# Patient Record
Sex: MALE | Race: WHITE | HISPANIC OR LATINO | Employment: FULL TIME | URBAN - METROPOLITAN AREA
[De-identification: names, ages, dates, MRNs, and addresses within clinical notes are randomized per-mention and may not be internally consistent; named-entity substitution may affect disease eponyms.]

---

## 2023-06-01 ENCOUNTER — HOSPITAL ENCOUNTER (EMERGENCY)
Facility: HOSPITAL | Age: 58
Discharge: HOME | End: 2023-06-01
Attending: EMERGENCY MEDICINE
Payer: MEDICAID

## 2023-06-01 ENCOUNTER — APPOINTMENT (OUTPATIENT)
Dept: CT IMAGING | Facility: HOSPITAL | Age: 58
End: 2023-06-01
Payer: MEDICAID

## 2023-06-01 VITALS
WEIGHT: 186 LBS | HEIGHT: 63 IN | RESPIRATION RATE: 15 BRPM | BODY MASS INDEX: 32.96 KG/M2 | SYSTOLIC BLOOD PRESSURE: 148 MMHG | HEART RATE: 90 BPM | OXYGEN SATURATION: 95 % | DIASTOLIC BLOOD PRESSURE: 96 MMHG | TEMPERATURE: 98.2 F

## 2023-06-01 DIAGNOSIS — M54.2 ACUTE NECK PAIN: Primary | ICD-10-CM

## 2023-06-01 PROCEDURE — 96372 THER/PROPH/DIAG INJ SC/IM: CPT

## 2023-06-01 PROCEDURE — 99283 EMERGENCY DEPT VISIT LOW MDM: CPT

## 2023-06-01 PROCEDURE — 72125 CT NECK SPINE W/O DYE: CPT

## 2023-06-01 PROCEDURE — 25010000002 METHYLPREDNISOLONE PER 80 MG: Performed by: PHYSICIAN ASSISTANT

## 2023-06-01 RX ORDER — METHYLPREDNISOLONE 4 MG/1
TABLET ORAL
Qty: 21 TABLET | Refills: 0 | Status: SHIPPED | OUTPATIENT
Start: 2023-06-01

## 2023-06-01 RX ORDER — METHYLPREDNISOLONE ACETATE 80 MG/ML
80 INJECTION, SUSPENSION INTRA-ARTICULAR; INTRALESIONAL; INTRAMUSCULAR; SOFT TISSUE ONCE
Status: COMPLETED | OUTPATIENT
Start: 2023-06-01 | End: 2023-06-01

## 2023-06-01 RX ORDER — METHOCARBAMOL 750 MG/1
750 TABLET, FILM COATED ORAL 3 TIMES DAILY PRN
Qty: 30 TABLET | Refills: 0 | Status: SHIPPED | OUTPATIENT
Start: 2023-06-01

## 2023-06-01 RX ORDER — DIAZEPAM 2 MG/1
2 TABLET ORAL ONCE
Status: COMPLETED | OUTPATIENT
Start: 2023-06-01 | End: 2023-06-01

## 2023-06-01 RX ADMIN — DIAZEPAM 2 MG: 2 TABLET ORAL at 14:28

## 2023-06-01 RX ADMIN — METHYLPREDNISOLONE ACETATE 80 MG: 80 INJECTION, SUSPENSION INTRA-ARTICULAR; INTRALESIONAL; INTRAMUSCULAR; SOFT TISSUE at 14:29

## 2023-06-01 NOTE — Clinical Note
[facility] EMERGENCY DEPARTMENT  [address] [facility] [address]  Phone: [phone]    Heriberto Fields was seen and treated in our emergency department on 6/1/2023.  He may return to work on 06/03/2023.         Thank you for choosing [facility].    Ashley Pina PA-C

## 2023-06-01 NOTE — DISCHARGE INSTRUCTIONS
Follow-up with Dr. Lopez regarding your neck pain  Call patient connection to establish with a primary care provider  Complete entire course of Medrol steroid pack and use the Robaxin muscle relaxer to help with pain.  Return to emergency department if you develop fever, numbness or weakness of your arms, cannot walk.

## 2023-06-01 NOTE — ED PROVIDER NOTES
EMERGENCY DEPARTMENT ENCOUNTER    Room Number:  S01/01  Date seen:  6/2/2023  PCP: Provider, No Known        HPI:  Chief Complaint: Neck pain    Context: Heriberto Fields is a 57 y.o. male who presents to the ED c/o neck pain that has progressively worsened over the past 3 days.  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  3 days ago he started feeling pain in his neck that now radiates to both sides.  Reports it hurts to turn his neck in any direction.  Denies associated fever, numbness/weakness of the upper extremities, bowel/bladder incontinence, or saddle anesthesia.  No blunt trauma or injury to the neck or back.  Patient has been ambulatory without difficulty.  No other systemic complaints at this time.      External (non-ED) record review:   · Reviewed note from office visit with PCP with labs 5/2023 where patient seen for annual physical exam, hyperlipidemia, hypertension, lumbar radiculopathy.  Reviewed assessment and plan.  Plan to check labs, continue current medications, return in 6 months for follow-up.  · Reviewed recent laboratory studies.  Most recent CBC with hemoglobin 15.8.  Most recent CMP with creatinine 1.00.      PAST MEDICAL HISTORY  Active Ambulatory Problems     Diagnosis Date Noted   • No Active Ambulatory Problems     Resolved Ambulatory Problems     Diagnosis Date Noted   • No Resolved Ambulatory Problems     No Additional Past Medical History         PAST SURGICAL HISTORY  No past surgical history on file.      FAMILY HISTORY  No family history on file.      SOCIAL HISTORY  Social History     Socioeconomic History   • Marital status:          ALLERGIES  Patient has no known allergies.        REVIEW OF SYSTEMS  Review of Systems   Constitutional: Negative for chills and fever.   HENT: Negative for ear pain and sore throat.    Respiratory: Negative for cough and shortness of breath.    Cardiovascular:  Negative for chest pain and palpitations.   Gastrointestinal: Negative for abdominal pain and vomiting.   Genitourinary: Negative for dysuria and hematuria.   Musculoskeletal: Positive for neck pain. Negative for arthralgias and joint swelling.   Skin: Negative for pallor and rash.   Neurological: Negative for syncope and headaches.   Psychiatric/Behavioral: Negative for confusion and hallucinations.            PHYSICAL EXAM  ED Triage Vitals [06/01/23 1308]   Temp Heart Rate Resp BP SpO2   98.2 °F (36.8 °C) 90 15 148/96 95 %      Temp src Heart Rate Source Patient Position BP Location FiO2 (%)   -- -- -- -- --       Physical Exam  Constitutional:       General: He is not in acute distress.     Appearance: Normal appearance.   HENT:      Head: Normocephalic and atraumatic.      Nose: Nose normal.      Mouth/Throat:      Mouth: Mucous membranes are moist.   Eyes:      Conjunctiva/sclera: Conjunctivae normal.      Pupils: Pupils are equal, round, and reactive to light.   Neck:      Comments: Diffuse cervical spine tenderness.  Tenderness and spasm noted to bilateral upper trapezius muscles.  Limited ROM of the neck secondary to pain.  Cardiovascular:      Rate and Rhythm: Normal rate and regular rhythm.      Pulses: Normal pulses.      Heart sounds: Normal heart sounds.      Comments: Distal pulses intact  Pulmonary:      Effort: Pulmonary effort is normal.      Breath sounds: Normal breath sounds.   Abdominal:      General: There is no distension.   Musculoskeletal:         General: Normal range of motion.      Cervical back: Normal range of motion and neck supple.   Skin:     General: Skin is warm.      Capillary Refill: Capillary refill takes less than 2 seconds.   Neurological:      General: No focal deficit present.      Mental Status: He is alert and oriented to person, place, and time.      Comments: SILT BUE.  Motor strength 5/5 BUE   Psychiatric:         Mood and Affect: Mood normal.         Vital signs and  nursing notes reviewed.          RADIOLOGY  CT Cervical Spine Without Contrast    Result Date: 6/1/2023  CT CERVICAL SPINE WITHOUT CONTRAST  HISTORY: Neck tenderness for 3 days.  COMPARISON: None.  FINDINGS: There is moderate loss of disc height at C5-C6 and C6-C7. There is a grade 1 retrolisthesis of C5 on C6 estimated to be 2 mm. There is no evidence of prevertebral edema or fracture.  C2-C3: Mild uncovertebral degenerative disease is present on the right.  C3-C4: Mild-to-moderate facet degenerative disease is present on the left. There is mild central disc bulge or protrusion.  C4-C5: A central disc bulge or protrusion is appreciated which abuts and mildly flattens the ventral surface of the cord.  C5-C6: A mild broad-based disc osteophyte complex is present which is more prominent to the right. Mild foraminal stenosis is present on the right secondary to uncovertebral degenerative disease and loss of disc height and extension of a disc osteophyte complex into the neural foramen.  C6-C7: A mild central disc osteophyte complex is present with no evidence of herniation. Mild-to-moderate foraminal stenosis is present on the left secondary to uncovertebral degenerative disease.  C7-T1: There is no evidence of disc bulge or herniation.      1. Multilevel degenerative disease involving the cervical spine is noted as described above. 2. There is no evidence of fracture. 3. Degenerative disease includes multilevel disc osteophyte complexes and foraminal stenosis. No obvious disc herniation is appreciated. See above. Further evaluation could be performed with a MRI examination of the cervical spine as indicated.      Radiation dose reduction techniques were utilized, including automated exposure control and exposure modulation based on body size.  This report was finalized on 6/1/2023 7:56 PM by Dr. Seven Ceja M.D.        Ordered the above noted radiological studies. Reviewed by me in PACS.              MEDICATIONS  GIVEN IN ER  Medications   diazePAM (VALIUM) tablet 2 mg (2 mg Oral Given 6/1/23 1428)   methylPREDNISolone acetate (DEPO-medrol) injection 80 mg (80 mg Intramuscular Given 6/1/23 1429)                   MEDICAL DECISION MAKING, PROGRESS, and CONSULTS    All labs have been independently reviewed by me.  All radiology studies have been reviewed by me and I have also reviewed the radiology report.   EKG's independently viewed and interpreted by me.  Discussion below represents my analysis of pertinent findings related to patient's condition, differential diagnosis, treatment plan and final disposition.      Additional sources:    - Chronic or social conditions impacting care: Hypertension and hyperlipidemia        Orders placed during this visit:  Orders Placed This Encounter   Procedures   • CT Cervical Spine Without Contrast         Additional orders considered but not ordered:  Tylenol    Differential diagnosis:  Cervical radiculopathy, cervical muscle spasm, cervical spine fracture      Independent interpretation of labs, radiology studies, and discussions with consultants:  ED Course as of 06/02/23 1000   Thu Jun 01, 2023   1554 CT Cervical Spine Without Contrast  Radiology report reviewed, chronic degenerative changes noted without spinal stenosis [DC]   1900 Patient presents with neck pain today.  Worked up with CT of cervical spine, without evidence for C-spine fracture or large disc herniation.  Patient reports symptomatic improvement after Depo-Medrol and Valium in the ED.  Encouraged him to follow-up with neurosurgery regarding his neck pain, will discharge with steroid pack and muscle relaxers to help with pain at home.  Discussed ED return precautions.  He is otherwise well-appearing, hemodynamically stable, and therefore appropriate for discharge. [MP]      ED Course User Index  [DC] Rancho Sage MD  [MP] Ashley Pina PA-C             Patient was wearing a face mask when I entered the room and  they continued to wear a mask throughout their stay in the ED.  I wore PPE, including  gloves, face mask with shield or face mask with goggles whenever I was in the room with patient.     DIAGNOSIS  Final diagnoses:   Acute neck pain           Follow Up:  [facility] - [place] [address]  [phone]  Schedule an appointment as soon as possible for a visit in 2 days  For follow-up of your complaints    Gus Lopez MD  [address]  [place] [id number] [phone]    Schedule an appointment as soon as possible for a visit   For follow-up of your neck pain      RX:     Medication List      New Prescriptions    methocarbamol 750 MG tablet  Commonly known as: ROBAXIN  Take 1 tablet by mouth 3 (Three) Times a Day As Needed for Muscle Spasms.     methylPREDNISolone 4 MG dose pack  Commonly known as: MEDROL  Take as directed on package instructions.           Where to Get Your Medications      These medications were sent to [id number] - [place], [place] - [address]  AT [facility] [address] - [phone]  - [phone]   [address] , [facility] [address]    Phone: [phone]   · methocarbamol 750 MG tablet  · methylPREDNISolone 4 MG dose pack         Latest Documented Vital Signs:  As of 10:00 EDT  BP- 148/96 HR- 90 Temp- 98.2 °F (36.8 °C) O2 sat- 95%              --    Please note that portions of this were completed with a voice recognition program.       Note Disclaimer: At [facility], we believe that sharing information builds trust and better relationships. You are receiving this note because you are receiving care at [facility] or recently visited. It is possible you will see health information before a provider has talked with you about it. This kind of information can be easy to misunderstand. To help you fully understand what it means for your health, we urge you to discuss this note with your provider.            Ashley Pina PA-C  06/02/23 1000

## 2023-06-01 NOTE — Clinical Note
[facility] EMERGENCY DEPARTMENT  [address] [facility] [address]  Phone: [phone]    Heriberto Fields was seen and treated in our emergency department on 6/1/2023.  He may return to work on 06/03/2023.         Thank you for choosing [facility].    Ashley Pina PA-C

## 2023-06-01 NOTE — ED PROVIDER NOTES
Pt presents to the ED c/o  progressive bilateral neck pain over the last 3 days, has pain in both sides of his neck without significant numbness or weakness of the arms or legs.  No fall or direct trauma.  No fevers or chills.     On exam,   General: No acute distress, nontoxic  HEENT: EOMI, bilateral paraspinal tenderness to to palpation  Pulm: Symmetric chest rise, nonlabored breathing  CV: Regular rate and rhythm  GI: Nondistended  MSK: No deformity  Skin: Warm, dry  Neuro: Awake, alert, oriented x 4, 5 out of 5 strength sensation bilateral upper and lower extremities, moving all extremities, no focal deficits  Psych: Calm, cooperative    Vital signs and nursing notes reviewed.           Plan:   ED Course as of 06/02/23 1609   Thu Jun 01, 2023   1554 CT Cervical Spine Without Contrast  Radiology report reviewed, chronic degenerative changes noted without spinal stenosis [DC]   1900 Patient presents with neck pain today.  Worked up with CT of cervical spine, without evidence for C-spine fracture or large disc herniation.  Patient reports symptomatic improvement after Depo-Medrol and Valium in the ED.  Encouraged him to follow-up with neurosurgery regarding his neck pain, will discharge with steroid pack and muscle relaxers to help with pain at home.  Discussed ED return precautions.  He is otherwise well-appearing, hemodynamically stable, and therefore appropriate for discharge. [MP]      ED Course User Index  [DC] Rancho Sage MD  [MP] Ashley Pina, ARIC     Plan for a course of steroids and muscle relaxers with outpatient neurosurgery follow-up.  Supportive care measures discussed, ED return for worsening symptoms as needed.  All questions and concerns addressed.     Attestation:  The PAMELA and I have discussed this patient's history, physical exam, and treatment plan.  I have reviewed the documentation and personally had a face to face interaction with the patient. I affirm the documentation and agree  with the treatment and plan.  The attached note describes my personal findings.            Rancho Sage MD  06/01/23 4732       Rancho aSge MD  06/02/23 5433

## 2023-08-03 ENCOUNTER — HOSPITAL ENCOUNTER (EMERGENCY)
Facility: HOSPITAL | Age: 58
Discharge: HOME | End: 2023-08-03
Attending: EMERGENCY MEDICINE
Payer: MEDICAID

## 2023-08-03 VITALS
HEART RATE: 96 BPM | TEMPERATURE: 97.6 F | WEIGHT: 176 LBS | DIASTOLIC BLOOD PRESSURE: 76 MMHG | SYSTOLIC BLOOD PRESSURE: 110 MMHG | BODY MASS INDEX: 26.67 KG/M2 | HEIGHT: 68 IN | RESPIRATION RATE: 18 BRPM | OXYGEN SATURATION: 96 %

## 2023-08-03 DIAGNOSIS — S81.811A LACERATION OF RIGHT LOWER EXTREMITY, INITIAL ENCOUNTER: Primary | ICD-10-CM

## 2023-08-03 PROCEDURE — 90715 TDAP VACCINE 7 YRS/> IM: CPT | Performed by: PHYSICIAN ASSISTANT

## 2023-08-03 PROCEDURE — 99283 EMERGENCY DEPT VISIT LOW MDM: CPT

## 2023-08-03 PROCEDURE — 90471 IMMUNIZATION ADMIN: CPT | Performed by: PHYSICIAN ASSISTANT

## 2023-08-03 PROCEDURE — 25010000002 TETANUS-DIPHTH-ACELL PERTUSSIS 5-2.5-18.5 LF-MCG/0.5 SUSPENSION PREFILLED SYRINGE: Performed by: PHYSICIAN ASSISTANT

## 2023-08-03 RX ADMIN — TETANUS TOXOID, REDUCED DIPHTHERIA TOXOID AND ACELLULAR PERTUSSIS VACCINE, ADSORBED 0.5 ML: 5; 2.5; 8; 8; 2.5 SUSPENSION INTRAMUSCULAR at 21:57

## 2023-08-04 NOTE — ED NOTES
Pt was loading stuff on truck at home when he slipped and fell. Pt c/o right anterior leg pain and lip pain. Pt has wound to right lower leg and swelling to right upper lip. Bleeding controlled

## 2023-08-04 NOTE — ED PROVIDER NOTES
Pt presents to the ED c/o a slip and fall earlier today landing on his right knee and busting his upper lip.  No LOC, not on any anticoagulation.  Denies any neck pain or back pain.  No numbness or weakness to the arms or legs otherwise.     On exam,   General: No acute distress, nontoxic  HEENT: EOMI, contusion to the right upper lip without underlying laceration or dental trauma  Pulm: Symmetric chest rise, nonlabored breathing  CV: Regular rate and rhythm  GI: Nondistended  MSK: 3 cm laceration over the anterior right knee with intact range of motion  Skin: Warm, dry  Neuro: Awake, alert, oriented x 4, moving all extremities, no focal deficits  Psych: Calm, cooperative    Vital signs and nursing notes reviewed.         Plan: Plan for laceration repair, no need for imaging at this time given reassuring exam.  Supportive care measures discussed, suture removal in 1 week, ED return for worsening symptoms as needed.       Attestation:  The PAMELA and I have discussed this patient's history, physical exam, and treatment plan.  I have reviewed the documentation and personally had a face to face interaction with the patient. I affirm the documentation and agree with the treatment and plan.  The attached note describes my personal findings.            Rancho Sage MD  08/03/23 7425

## 2023-08-04 NOTE — ED PROVIDER NOTES
EMERGENCY DEPARTMENT ENCOUNTER    Room Number:  T01/01  PCP: Courtney Croft APRN      HPI:  Chief Complaint: Fall  A complete HPI/ROS/PMH/PSH/SH/FH are unobtainable due to: Nothing  Context: Heriberto Fields is a 58 y.o. male who presents to the ED c/o mechanical fall.  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  The floor was said to be slippery contributing to the fall.  Patient states he hit his upper lip but did not sustain a laceration.  It is not painful to bite or chew.  He also hit the right knee sustaining a laceration.    Pain is said to be minimal and primarily localized to the laceration site overlying the right knee.  Pain does not radiate.  He states he can ambulate on the right knee without any difficulty.  His last Tdap is unknown.    He denies LOC, nausea, vomiting, being on anticoagulant antiplatelet therapy.  He denies any other pain or injury at this time.      PAST MEDICAL HISTORY  Active Ambulatory Problems     Diagnosis Date Noted    No Active Ambulatory Problems     Resolved Ambulatory Problems     Diagnosis Date Noted    No Resolved Ambulatory Problems     Past Medical History:   Diagnosis Date    GERD (gastroesophageal reflux disease)          PAST SURGICAL HISTORY  History reviewed. No pertinent surgical history.      FAMILY HISTORY  History reviewed. No pertinent family history.      SOCIAL HISTORY  Social History     Socioeconomic History    Marital status:    Tobacco Use    Smoking status: Never   Substance and Sexual Activity    Alcohol use: Yes     Comment: socially    Drug use: Never         ALLERGIES  Patient has no known allergies.        REVIEW OF SYSTEMS  Review of Systems     All systems reviewed and negative except for those discussed in HPI.       PHYSICAL EXAM  ED Triage Vitals   Temp Heart Rate Resp BP SpO2   08/03/23 2052 08/03/23 2051 08/03/23 2051 08/03/23 2107 08/03/23 2051   97.6 øF (36.4 øC) 108 18 110/76 96 %      Temp src Heart Rate  Source Patient Position BP Location FiO2 (%)   -- 08/03/23 2107 08/03/23 2107 08/03/23 2107 --    Monitor Sitting Left arm        Physical Exam      GENERAL: WDWN male, no acute distress  HENT: nares patent.  EYES: no scleral icterus  CV: regular rhythm, normal rate  RESPIRATORY: normal effort  ABDOMEN: soft, nontender  MUSCULOSKELETAL: no deformity, no bony pain globally  NEURO: alert, moves all extremities, follows commands  PSYCH:  calm, cooperative  SKIN: 3 cm vertical laceration overlying the right knee.  Laceration appears superficial and does not extend into the subcutaneous tissue and there is no tendon involvement.  Vital signs and nursing notes reviewed.          LAB RESULTS  No results found for this or any previous visit (from the past 24 hour(s)).    Ordered the above labs and reviewed the results.        RADIOLOGY  No Radiology Exams Resulted Within Past 24 Hours    Ordered the above noted radiological studies. Reviewed by me in PACS.          PROCEDURES  Laceration Repair    Date/Time: 8/3/2023 9:58 PM  Performed by: Jude Edmond III, PA  Authorized by: Rancho Sage MD     Consent:     Consent obtained:  Verbal    Consent given by:  Patient    Risks discussed:  Infection  Universal protocol:     Patient identity confirmed:  Verbally with patient  Anesthesia:     Anesthesia method:  Local infiltration    Local anesthetic:  Lidocaine 1% WITH epi  Laceration details:     Location:  Leg    Leg location:  R knee    Length (cm):  3  Pre-procedure details:     Preparation:  Patient was prepped and draped in usual sterile fashion and imaging obtained to evaluate for foreign bodies  Exploration:     Hemostasis achieved with:  Cautery    Wound exploration: wound explored through full range of motion and entire depth of wound visualized    Treatment:     Area cleansed with:  Saline and Shur-Clens    Amount of cleaning:  Extensive    Irrigation solution:  Sterile saline    Irrigation volume:  1000     Irrigation method:  Pressure wash  Skin repair:     Repair method:  Sutures    Suture size:  4-0    Suture material:  Nylon    Suture technique:  Simple interrupted    Number of sutures:  6  Approximation:     Approximation:  Close  Repair type:     Repair type:  Simple  Post-procedure details:     Dressing:  Antibiotic ointment    Procedure completion:  Tolerated well, no immediate complications          MEDICATIONS GIVEN IN ER  Medications   Tetanus-Diphth-Acell Pertussis (BOOSTRIX) injection 0.5 mL (0.5 mL Intramuscular Given 8/3/23 2157)         MEDICAL DECISION MAKING, PROGRESS, and CONSULTS    Discussion below represents my analysis of pertinent findings related to patient's condition, differential diagnosis, treatment plan and final disposition.      Orders placed during this visit:  Orders Placed This Encounter   Procedures    Laceration Repair         Additional sources:  - Discussed/obtained information from independent historians: None available  Additional information was obtained to confirm the patient's history.    - External (non-ED) record review: Patient was seen on 1/5/2023 by TOMER Whittaker with [facility].  Appear to be an annual wellness visit.  Noted to have a history of hypertension hyperlipidemia and lower back pain, allergies and vitamin D deficiency.  Colonoscopy was discussed.  Review of systems was negative other than reported nervousness and anxiousness as well as environmental allergies.  Exam was unremarkable.  Labs are obtained.  His Lipitor was refilled.  PSA in the future was discussed.  He was diagnosed with urticaria and given Vistaril to take 3 times daily as needed for rash and itching.  He was diagnosed with allergies and Xyzal and Rhinocort refilled.  His losartan was spilled.  He was given diclofenac 75 mg twice daily for his back pain.  Influenza shot was discussed.   .         - Chronic or social conditions impacting care: None        Differential  diagnosis:    Uncomplicated laceration, laceration foreign body, laceration with vascular involvement, laceration with tendon involvement, laceration with exposed joint space.  Physical exam confirms uncomplicated laceration.  Plan to close the wound with simple interrupted sutures.  See procedure note for details.            Independent interpretation of labs, radiology studies, and discussions with consultants:             DIAGNOSIS  Final diagnoses:   Laceration of right lower extremity, initial encounter         DISPOSITION  DISCHARGE    Patient discharged in stable condition.    Reviewed implications of results, diagnosis, meds, responsibility to follow up, warning signs and symptoms of possible worsening, potential complications and reasons to return to ER.    Patient/Family voiced understanding of above instructions.    Discussed plan for discharge, as there is no emergent indication for admission. Patient referred to primary care provider for BP management due to today's BP. Pt/family is agreeable and understands need for follow up and repeat testing.  Pt is aware that discharge does not mean that nothing is wrong but it indicates no emergency is present that requires admission and they must continue care with follow-up as given below or physician of their choice.     FOLLOW-UP  Courtney Croft, APRN  [address]  [address] [address]  [phone]    Schedule an appointment as soon as possible for a visit on 8/14/2023  For suture removal         Medication List        New Prescriptions      mupirocin 2 % ointment  Commonly known as: BACTROBAN  Apply 1 application  topically to the appropriate area as directed 3 (Three) Times a Day.               Where to Get Your Medications        These medications were sent to [id number] - [place], [place] - [address] AT [facility] [address] [facility] [phone] [facility] [phone]   [address] [facility]  [address]      Phone: [phone]   mupirocin 2 % ointment            Latest Documented Vital Signs:  As of 22:25 EDT  BP- 110/76 HR- 96 Temp- 97.6 øF (36.4 øC) O2 sat- 96%      --    Please note that portions of this were completed with a voice recognition program.       Note Disclaimer: At [facility], we believe that sharing information builds trust and better relationships. You are receiving this note because you are receiving care at [facility] or recently visited. It is possible you will see health information before a provider has talked with you about it. This kind of information can be easy to misunderstand. To help you fully understand what it means for your health, we urge you to discuss this note with your provider.         Jude Edmond III, PA  08/03/23 0262

## 2023-08-04 NOTE — DISCHARGE INSTRUCTIONS
Keep laceration clean and dry, apply antibiotic ointment once or twice daily, watch carefully for signs of infection, sutures need to be removed on 8/14/2023.  Return to care if any complications.

## 2023-08-04 NOTE — ED TRIAGE NOTES
Pt to ED via PV for reports of lip swelling and right knee pain with skin tear from a fall forward while loading materials in the back of a truck. Pt denies LOC, denies blood thinner use.

## 2023-09-04 ENCOUNTER — APPOINTMENT (OUTPATIENT)
Dept: CT IMAGING | Facility: HOSPITAL | Age: 58
DRG: 103 | End: 2023-09-04
Payer: MEDICAID

## 2023-09-04 ENCOUNTER — APPOINTMENT (OUTPATIENT)
Dept: GENERAL RADIOLOGY | Facility: HOSPITAL | Age: 58
DRG: 103 | End: 2023-09-04
Payer: MEDICAID

## 2023-09-04 ENCOUNTER — HOSPITAL ENCOUNTER (INPATIENT)
Facility: HOSPITAL | Age: 58
LOS: 6 days | Discharge: HOME | DRG: 103 | End: 2023-09-10
Attending: EMERGENCY MEDICINE | Admitting: INTERNAL MEDICINE
Payer: MEDICAID

## 2023-09-04 DIAGNOSIS — R29.898 LEFT ARM WEAKNESS: Primary | ICD-10-CM

## 2023-09-04 DIAGNOSIS — R73.9 HYPERGLYCEMIA: ICD-10-CM

## 2023-09-04 DIAGNOSIS — E87.6 HYPOKALEMIA: ICD-10-CM

## 2023-09-04 DIAGNOSIS — R29.810 FACIAL DROOP: ICD-10-CM

## 2023-09-04 DIAGNOSIS — Z86.73 HISTORY OF STROKE: ICD-10-CM

## 2023-09-04 PROBLEM — I63.9 STROKE: Status: ACTIVE | Noted: 2023-09-04

## 2023-09-04 LAB
ABO GROUP BLD: NORMAL
ALBUMIN SERPL-MCNC: 4.5 G/DL (ref 3.5–5.2)
ALBUMIN/GLOB SERPL: 1.5 G/DL
ALP SERPL-CCNC: 73 U/L (ref 39–117)
ALT SERPL W P-5'-P-CCNC: 34 U/L (ref 1–41)
ANION GAP SERPL CALCULATED.3IONS-SCNC: 18.4 MMOL/L (ref 5–15)
APTT PPP: 24.3 SECONDS (ref 22.7–35.4)
AST SERPL-CCNC: 25 U/L (ref 1–40)
BASOPHILS # BLD AUTO: 0.03 10*3/MM3 (ref 0–0.2)
BASOPHILS NFR BLD AUTO: 0.3 % (ref 0–1.5)
BILIRUB SERPL-MCNC: 0.3 MG/DL (ref 0–1.2)
BLD GP AB SCN SERPL QL: NEGATIVE
BUN SERPL-MCNC: 8 MG/DL (ref 8–23)
BUN/CREAT SERPL: 7 (ref 7–25)
CALCIUM SPEC-SCNC: 9 MG/DL (ref 8.6–10.5)
CHLORIDE SERPL-SCNC: 105 MMOL/L (ref 98–107)
CO2 SERPL-SCNC: 18.6 MMOL/L (ref 22–29)
CREAT SERPL-MCNC: 1.14 MG/DL (ref 0.76–1.27)
DEPRECATED RDW RBC AUTO: 46.7 FL (ref 37–54)
EGFRCR SERPLBLD CKD-EPI 2021: 73.6 ML/MIN/1.73
EOSINOPHIL # BLD AUTO: 0.27 10*3/MM3 (ref 0–0.4)
EOSINOPHIL NFR BLD AUTO: 2.6 % (ref 0.3–6.2)
ERYTHROCYTE [DISTWIDTH] IN BLOOD BY AUTOMATED COUNT: 13.3 % (ref 12.3–15.4)
GLOBULIN UR ELPH-MCNC: 3 GM/DL
GLUCOSE BLDC GLUCOMTR-MCNC: 197 MG/DL (ref 70–130)
GLUCOSE BLDC GLUCOMTR-MCNC: 211 MG/DL (ref 70–130)
GLUCOSE SERPL-MCNC: 241 MG/DL (ref 65–99)
HCT VFR BLD AUTO: 48.8 % (ref 37.5–51)
HGB BLD-MCNC: 16.3 G/DL (ref 13–17.7)
HOLD SPECIMEN: NORMAL
HOLD SPECIMEN: NORMAL
IMM GRANULOCYTES # BLD AUTO: 0.01 10*3/MM3 (ref 0–0.05)
IMM GRANULOCYTES NFR BLD AUTO: 0.1 % (ref 0–0.5)
INR PPP: 0.88 (ref 0.9–1.1)
LYMPHOCYTES # BLD AUTO: 4.04 10*3/MM3 (ref 0.7–3.1)
LYMPHOCYTES NFR BLD AUTO: 39.4 % (ref 19.6–45.3)
MCH RBC QN AUTO: 31.7 PG (ref 26.6–33)
MCHC RBC AUTO-ENTMCNC: 33.4 G/DL (ref 31.5–35.7)
MCV RBC AUTO: 94.9 FL (ref 79–97)
MONOCYTES # BLD AUTO: 0.82 10*3/MM3 (ref 0.1–0.9)
MONOCYTES NFR BLD AUTO: 8 % (ref 5–12)
NEUTROPHILS NFR BLD AUTO: 49.6 % (ref 42.7–76)
NEUTROPHILS NFR BLD AUTO: 5.09 10*3/MM3 (ref 1.7–7)
NRBC BLD AUTO-RTO: 0 /100 WBC (ref 0–0.2)
PLATELET # BLD AUTO: 300 10*3/MM3 (ref 140–450)
PMV BLD AUTO: 9.4 FL (ref 6–12)
POTASSIUM SERPL-SCNC: 3.2 MMOL/L (ref 3.5–5.2)
PROT SERPL-MCNC: 7.5 G/DL (ref 6–8.5)
PROTHROMBIN TIME: 12.1 SECONDS (ref 11.7–14.2)
RBC # BLD AUTO: 5.14 10*6/MM3 (ref 4.14–5.8)
RH BLD: POSITIVE
SODIUM SERPL-SCNC: 142 MMOL/L (ref 136–145)
T&S EXPIRATION DATE: NORMAL
TROPONIN T SERPL HS-MCNC: 9 NG/L
WBC NRBC COR # BLD: 10.26 10*3/MM3 (ref 3.4–10.8)
WHOLE BLOOD HOLD COAG: NORMAL
WHOLE BLOOD HOLD SPECIMEN: NORMAL

## 2023-09-04 PROCEDURE — 71045 X-RAY EXAM CHEST 1 VIEW: CPT

## 2023-09-04 PROCEDURE — 84484 ASSAY OF TROPONIN QUANT: CPT | Performed by: PHYSICIAN ASSISTANT

## 2023-09-04 PROCEDURE — 70496 CT ANGIOGRAPHY HEAD: CPT

## 2023-09-04 PROCEDURE — 0042T HC CT CEREBRAL PERFUSION W/WO CONTRAST: CPT

## 2023-09-04 PROCEDURE — 86901 BLOOD TYPING SEROLOGIC RH(D): CPT | Performed by: PHYSICIAN ASSISTANT

## 2023-09-04 PROCEDURE — 82948 REAGENT STRIP/BLOOD GLUCOSE: CPT

## 2023-09-04 PROCEDURE — 25510000001 IOPAMIDOL PER 1 ML: Performed by: EMERGENCY MEDICINE

## 2023-09-04 PROCEDURE — 3E03317 INTRODUCTION OF OTHER THROMBOLYTIC INTO PERIPHERAL VEIN, PERCUTANEOUS APPROACH: ICD-10-PCS | Performed by: EMERGENCY MEDICINE

## 2023-09-04 PROCEDURE — 86850 RBC ANTIBODY SCREEN: CPT | Performed by: PHYSICIAN ASSISTANT

## 2023-09-04 PROCEDURE — 93010 ELECTROCARDIOGRAM REPORT: CPT | Performed by: INTERNAL MEDICINE

## 2023-09-04 PROCEDURE — 85025 COMPLETE CBC W/AUTO DIFF WBC: CPT | Performed by: PHYSICIAN ASSISTANT

## 2023-09-04 PROCEDURE — 85730 THROMBOPLASTIN TIME PARTIAL: CPT | Performed by: PHYSICIAN ASSISTANT

## 2023-09-04 PROCEDURE — 86900 BLOOD TYPING SEROLOGIC ABO: CPT | Performed by: PHYSICIAN ASSISTANT

## 2023-09-04 PROCEDURE — 85610 PROTHROMBIN TIME: CPT | Performed by: PHYSICIAN ASSISTANT

## 2023-09-04 PROCEDURE — 70498 CT ANGIOGRAPHY NECK: CPT

## 2023-09-04 PROCEDURE — 82565 ASSAY OF CREATININE: CPT

## 2023-09-04 PROCEDURE — 36415 COLL VENOUS BLD VENIPUNCTURE: CPT

## 2023-09-04 PROCEDURE — 25010000002 TENECTEPLASE PER 50 MG: Performed by: EMERGENCY MEDICINE

## 2023-09-04 PROCEDURE — 93005 ELECTROCARDIOGRAM TRACING: CPT | Performed by: PHYSICIAN ASSISTANT

## 2023-09-04 PROCEDURE — 70450 CT HEAD/BRAIN W/O DYE: CPT

## 2023-09-04 PROCEDURE — 99285 EMERGENCY DEPT VISIT HI MDM: CPT

## 2023-09-04 PROCEDURE — 80053 COMPREHEN METABOLIC PANEL: CPT | Performed by: PHYSICIAN ASSISTANT

## 2023-09-04 RX ORDER — FENTANYL CITRATE 50 UG/ML
25 INJECTION, SOLUTION INTRAMUSCULAR; INTRAVENOUS
Status: DISCONTINUED | OUTPATIENT
Start: 2023-09-04 | End: 2023-09-10 | Stop reason: HOSPADM

## 2023-09-04 RX ORDER — BISACODYL 10 MG
10 SUPPOSITORY, RECTAL RECTAL DAILY PRN
Status: DISCONTINUED | OUTPATIENT
Start: 2023-09-04 | End: 2023-09-10 | Stop reason: HOSPADM

## 2023-09-04 RX ORDER — ONDANSETRON 2 MG/ML
4 INJECTION INTRAMUSCULAR; INTRAVENOUS EVERY 6 HOURS PRN
Status: DISCONTINUED | OUTPATIENT
Start: 2023-09-04 | End: 2023-09-06

## 2023-09-04 RX ORDER — BISACODYL 10 MG
10 SUPPOSITORY, RECTAL RECTAL DAILY PRN
Status: DISCONTINUED | OUTPATIENT
Start: 2023-09-04 | End: 2023-09-05 | Stop reason: SDUPTHER

## 2023-09-04 RX ORDER — ACETAMINOPHEN 650 MG/1
650 SUPPOSITORY RECTAL EVERY 4 HOURS PRN
Status: DISCONTINUED | OUTPATIENT
Start: 2023-09-04 | End: 2023-09-10 | Stop reason: HOSPADM

## 2023-09-04 RX ORDER — SODIUM CHLORIDE 0.9 % (FLUSH) 0.9 %
10 SYRINGE (ML) INJECTION ONCE
Status: COMPLETED | OUTPATIENT
Start: 2023-09-04 | End: 2023-09-04

## 2023-09-04 RX ORDER — ONDANSETRON 4 MG/1
4 TABLET, FILM COATED ORAL EVERY 6 HOURS PRN
Status: DISCONTINUED | OUTPATIENT
Start: 2023-09-04 | End: 2023-09-10 | Stop reason: HOSPADM

## 2023-09-04 RX ORDER — SODIUM CHLORIDE 0.9 % (FLUSH) 0.9 %
10 SYRINGE (ML) INJECTION AS NEEDED
Status: DISCONTINUED | OUTPATIENT
Start: 2023-09-04 | End: 2023-09-10 | Stop reason: HOSPADM

## 2023-09-04 RX ORDER — ATORVASTATIN CALCIUM 80 MG/1
80 TABLET, FILM COATED ORAL NIGHTLY
Status: DISCONTINUED | OUTPATIENT
Start: 2023-09-04 | End: 2023-09-10 | Stop reason: HOSPADM

## 2023-09-04 RX ORDER — SODIUM CHLORIDE 0.9 % (FLUSH) 0.9 %
10 SYRINGE (ML) INJECTION
Status: COMPLETED | OUTPATIENT
Start: 2023-09-04 | End: 2023-09-04

## 2023-09-04 RX ORDER — HYDROCODONE BITARTRATE AND ACETAMINOPHEN 5; 325 MG/1; MG/1
1 TABLET ORAL EVERY 4 HOURS PRN
Status: DISCONTINUED | OUTPATIENT
Start: 2023-09-04 | End: 2023-09-10 | Stop reason: HOSPADM

## 2023-09-04 RX ORDER — SODIUM CHLORIDE 9 MG/ML
40 INJECTION, SOLUTION INTRAVENOUS AS NEEDED
Status: DISCONTINUED | OUTPATIENT
Start: 2023-09-04 | End: 2023-09-10 | Stop reason: HOSPADM

## 2023-09-04 RX ORDER — ACETAMINOPHEN 325 MG/1
650 TABLET ORAL EVERY 4 HOURS PRN
Status: DISCONTINUED | OUTPATIENT
Start: 2023-09-04 | End: 2023-09-10 | Stop reason: HOSPADM

## 2023-09-04 RX ORDER — ASPIRIN 325 MG
325 TABLET ORAL DAILY
Status: DISCONTINUED | OUTPATIENT
Start: 2023-09-05 | End: 2023-09-07

## 2023-09-04 RX ORDER — SODIUM CHLORIDE 0.9 % (FLUSH) 0.9 %
10 SYRINGE (ML) INJECTION EVERY 12 HOURS SCHEDULED
Status: DISCONTINUED | OUTPATIENT
Start: 2023-09-04 | End: 2023-09-10 | Stop reason: HOSPADM

## 2023-09-04 RX ORDER — ONDANSETRON 2 MG/ML
4 INJECTION INTRAMUSCULAR; INTRAVENOUS EVERY 6 HOURS PRN
Status: DISCONTINUED | OUTPATIENT
Start: 2023-09-04 | End: 2023-09-10 | Stop reason: HOSPADM

## 2023-09-04 RX ORDER — ALUMINA, MAGNESIA, AND SIMETHICONE 2400; 2400; 240 MG/30ML; MG/30ML; MG/30ML
7.5 SUSPENSION ORAL EVERY 4 HOURS PRN
Status: DISCONTINUED | OUTPATIENT
Start: 2023-09-04 | End: 2023-09-10 | Stop reason: HOSPADM

## 2023-09-04 RX ORDER — BISACODYL 5 MG/1
5 TABLET, DELAYED RELEASE ORAL DAILY PRN
Status: DISCONTINUED | OUTPATIENT
Start: 2023-09-04 | End: 2023-09-10 | Stop reason: HOSPADM

## 2023-09-04 RX ORDER — ASPIRIN 300 MG/1
300 SUPPOSITORY RECTAL DAILY
Status: DISCONTINUED | OUTPATIENT
Start: 2023-09-05 | End: 2023-09-07

## 2023-09-04 RX ORDER — ALUMINA, MAGNESIA, AND SIMETHICONE 2400; 2400; 240 MG/30ML; MG/30ML; MG/30ML
15 SUSPENSION ORAL EVERY 6 HOURS PRN
Status: DISCONTINUED | OUTPATIENT
Start: 2023-09-04 | End: 2023-09-10 | Stop reason: HOSPADM

## 2023-09-04 RX ORDER — DOCUSATE SODIUM 100 MG/1
100 CAPSULE, LIQUID FILLED ORAL DAILY
Status: DISCONTINUED | OUTPATIENT
Start: 2023-09-05 | End: 2023-09-10 | Stop reason: HOSPADM

## 2023-09-04 RX ORDER — SODIUM CHLORIDE 0.9 % (FLUSH) 0.9 %
10 SYRINGE (ML) INJECTION ONCE
Status: DISCONTINUED | OUTPATIENT
Start: 2023-09-04 | End: 2023-09-04

## 2023-09-04 RX ORDER — AMOXICILLIN 250 MG
2 CAPSULE ORAL 2 TIMES DAILY
Status: DISCONTINUED | OUTPATIENT
Start: 2023-09-04 | End: 2023-09-10 | Stop reason: HOSPADM

## 2023-09-04 RX ORDER — POLYETHYLENE GLYCOL 3350 17 G/17G
17 POWDER, FOR SOLUTION ORAL DAILY PRN
Status: DISCONTINUED | OUTPATIENT
Start: 2023-09-04 | End: 2023-09-10 | Stop reason: HOSPADM

## 2023-09-04 RX ORDER — POTASSIUM CHLORIDE 750 MG/1
40 TABLET, FILM COATED, EXTENDED RELEASE ORAL ONCE
Status: COMPLETED | OUTPATIENT
Start: 2023-09-04 | End: 2023-09-04

## 2023-09-04 RX ADMIN — SODIUM CHLORIDE 1000 ML: 9 INJECTION, SOLUTION INTRAVENOUS at 22:54

## 2023-09-04 RX ADMIN — Medication 10 ML: at 22:22

## 2023-09-04 RX ADMIN — Medication 10 ML: at 22:19

## 2023-09-04 RX ADMIN — TENECTEPLASE 19 MG: KIT at 22:19

## 2023-09-04 RX ADMIN — IOPAMIDOL 150 ML: 755 INJECTION, SOLUTION INTRAVENOUS at 21:17

## 2023-09-04 RX ADMIN — POTASSIUM CHLORIDE 40 MEQ: 750 TABLET, EXTENDED RELEASE ORAL at 22:52

## 2023-09-04 NOTE — Clinical Note
Level of Care: Critical Care [6]   Diagnosis: Stroke [854197]   Certification: I certify that inpatient hospital services are medically necessary for greater than 2 midnights.

## 2023-09-05 ENCOUNTER — APPOINTMENT (OUTPATIENT)
Dept: MRI IMAGING | Facility: HOSPITAL | Age: 58
DRG: 103 | End: 2023-09-05
Payer: MEDICAID

## 2023-09-05 ENCOUNTER — APPOINTMENT (OUTPATIENT)
Dept: CT IMAGING | Facility: HOSPITAL | Age: 58
DRG: 103 | End: 2023-09-05
Payer: MEDICAID

## 2023-09-05 ENCOUNTER — APPOINTMENT (OUTPATIENT)
Dept: CARDIOLOGY | Facility: HOSPITAL | Age: 58
DRG: 103 | End: 2023-09-05
Payer: MEDICAID

## 2023-09-05 LAB
ANION GAP SERPL CALCULATED.3IONS-SCNC: 13.1 MMOL/L (ref 5–15)
AORTIC ARCH: 2.5 CM
AORTIC DIMENSIONLESS INDEX: 0.9 (DI)
ASCENDING AORTA: 3.2 CM
BH CV ECHO MEAS - ACS: 2.5 CM
BH CV ECHO MEAS - AO MAX PG: 4.7 MMHG
BH CV ECHO MEAS - AO MEAN PG: 2.38 MMHG
BH CV ECHO MEAS - AO ROOT DIAM: 3.1 CM
BH CV ECHO MEAS - AO V2 MAX: 108.6 CM/SEC
BH CV ECHO MEAS - AO V2 VTI: 23.5 CM
BH CV ECHO MEAS - AVA(I,D): 3.7 CM2
BH CV ECHO MEAS - EDV(CUBED): 89.2 ML
BH CV ECHO MEAS - EDV(MOD-SP2): 98 ML
BH CV ECHO MEAS - EDV(MOD-SP4): 131 ML
BH CV ECHO MEAS - EF(MOD-BP): 60.3 %
BH CV ECHO MEAS - EF(MOD-SP2): 57.1 %
BH CV ECHO MEAS - EF(MOD-SP4): 58 %
BH CV ECHO MEAS - ESV(CUBED): 34 ML
BH CV ECHO MEAS - ESV(MOD-SP2): 42 ML
BH CV ECHO MEAS - ESV(MOD-SP4): 55 ML
BH CV ECHO MEAS - FS: 27.5 %
BH CV ECHO MEAS - IVS/LVPW: 1.08 CM
BH CV ECHO MEAS - IVSD: 1.14 CM
BH CV ECHO MEAS - LAT PEAK E' VEL: 11.6 CM/SEC
BH CV ECHO MEAS - LV DIASTOLIC VOL/BSA (35-75): 71.4 CM2
BH CV ECHO MEAS - LV MASS(C)D: 172.8 GRAMS
BH CV ECHO MEAS - LV MAX PG: 3.8 MMHG
BH CV ECHO MEAS - LV MEAN PG: 1.94 MMHG
BH CV ECHO MEAS - LV SYSTOLIC VOL/BSA (12-30): 30 CM2
BH CV ECHO MEAS - LV V1 MAX: 97.4 CM/SEC
BH CV ECHO MEAS - LV V1 VTI: 22.4 CM
BH CV ECHO MEAS - LVIDD: 4.5 CM
BH CV ECHO MEAS - LVIDS: 3.2 CM
BH CV ECHO MEAS - LVOT AREA: 3.9 CM2
BH CV ECHO MEAS - LVOT DIAM: 2.23 CM
BH CV ECHO MEAS - LVPWD: 1.06 CM
BH CV ECHO MEAS - MED PEAK E' VEL: 8.9 CM/SEC
BH CV ECHO MEAS - MV A DUR: 0.13 SEC
BH CV ECHO MEAS - MV A MAX VEL: 58.7 CM/SEC
BH CV ECHO MEAS - MV DEC SLOPE: 341.9 CM/SEC2
BH CV ECHO MEAS - MV DEC TIME: 213 MSEC
BH CV ECHO MEAS - MV E MAX VEL: 59.1 CM/SEC
BH CV ECHO MEAS - MV E/A: 1.01
BH CV ECHO MEAS - MV MAX PG: 2.39 MMHG
BH CV ECHO MEAS - MV MEAN PG: 0.96 MMHG
BH CV ECHO MEAS - MV P1/2T: 64 MSEC
BH CV ECHO MEAS - MV V2 VTI: 24.9 CM
BH CV ECHO MEAS - MVA(P1/2T): 3.4 CM2
BH CV ECHO MEAS - MVA(VTI): 3.5 CM2
BH CV ECHO MEAS - PA ACC TIME: 0.1 SEC
BH CV ECHO MEAS - PA V2 MAX: 80.1 CM/SEC
BH CV ECHO MEAS - QP/QS: 0.33
BH CV ECHO MEAS - RV MAX PG: 1.04 MMHG
BH CV ECHO MEAS - RV V1 MAX: 51 CM/SEC
BH CV ECHO MEAS - RV V1 VTI: 11.6 CM
BH CV ECHO MEAS - RVOT DIAM: 1.78 CM
BH CV ECHO MEAS - SI(MOD-SP2): 30.5 ML/M2
BH CV ECHO MEAS - SI(MOD-SP4): 41.4 ML/M2
BH CV ECHO MEAS - SUP REN AO DIAM: 1.6 CM
BH CV ECHO MEAS - SV(LVOT): 87.4 ML
BH CV ECHO MEAS - SV(MOD-SP2): 56 ML
BH CV ECHO MEAS - SV(MOD-SP4): 76 ML
BH CV ECHO MEAS - SV(RVOT): 29.1 ML
BH CV ECHO MEAS - TAPSE (>1.6): 2.09 CM
BH CV ECHO MEASUREMENTS AVERAGE E/E' RATIO: 5.77
BH CV ECHO SHUNT ASSESSMENT PERFORMED (HIDDEN SCRIPTING): 1
BH CV XLRA - RV BASE: 3.4 CM
BH CV XLRA - RV LENGTH: 7.5 CM
BH CV XLRA - RV MID: 2.48 CM
BH CV XLRA - TDI S': 10.6 CM/SEC
BUN SERPL-MCNC: 5 MG/DL (ref 8–23)
BUN/CREAT SERPL: 5.6 (ref 7–25)
CALCIUM SPEC-SCNC: 8.1 MG/DL (ref 8.6–10.5)
CHLORIDE SERPL-SCNC: 106 MMOL/L (ref 98–107)
CHOLEST SERPL-MCNC: 210 MG/DL (ref 0–200)
CO2 SERPL-SCNC: 21.9 MMOL/L (ref 22–29)
CREAT SERPL-MCNC: 0.89 MG/DL (ref 0.76–1.27)
DEPRECATED RDW RBC AUTO: 46.2 FL (ref 37–54)
EGFRCR SERPLBLD CKD-EPI 2021: 98.1 ML/MIN/1.73
ERYTHROCYTE [DISTWIDTH] IN BLOOD BY AUTOMATED COUNT: 13.4 % (ref 12.3–15.4)
GLUCOSE BLDC GLUCOMTR-MCNC: 107 MG/DL (ref 70–130)
GLUCOSE BLDC GLUCOMTR-MCNC: 92 MG/DL (ref 70–130)
GLUCOSE BLDC GLUCOMTR-MCNC: 95 MG/DL (ref 70–130)
GLUCOSE SERPL-MCNC: 131 MG/DL (ref 65–99)
HBA1C MFR BLD: 5.6 % (ref 4.8–5.6)
HCT VFR BLD AUTO: 41 % (ref 37.5–51)
HDLC SERPL-MCNC: 53 MG/DL (ref 40–60)
HGB BLD-MCNC: 13.7 G/DL (ref 13–17.7)
LDLC SERPL CALC-MCNC: 124 MG/DL (ref 0–100)
LDLC/HDLC SERPL: 2.25 {RATIO}
LEFT ATRIUM VOLUME INDEX: 21.6 ML/M2
MCH RBC QN AUTO: 31.4 PG (ref 26.6–33)
MCHC RBC AUTO-ENTMCNC: 33.4 G/DL (ref 31.5–35.7)
MCV RBC AUTO: 94 FL (ref 79–97)
PLATELET # BLD AUTO: 221 10*3/MM3 (ref 140–450)
PMV BLD AUTO: 9.8 FL (ref 6–12)
POTASSIUM SERPL-SCNC: 3.4 MMOL/L (ref 3.5–5.2)
POTASSIUM SERPL-SCNC: 4.4 MMOL/L (ref 3.5–5.2)
RBC # BLD AUTO: 4.36 10*6/MM3 (ref 4.14–5.8)
SINUS: 3.2 CM
SODIUM SERPL-SCNC: 141 MMOL/L (ref 136–145)
STJ: 3.1 CM
TRIGL SERPL-MCNC: 188 MG/DL (ref 0–150)
VLDLC SERPL-MCNC: 33 MG/DL (ref 5–40)
WBC NRBC COR # BLD: 5.89 10*3/MM3 (ref 3.4–10.8)

## 2023-09-05 PROCEDURE — 84132 ASSAY OF SERUM POTASSIUM: CPT | Performed by: INTERNAL MEDICINE

## 2023-09-05 PROCEDURE — 93306 TTE W/DOPPLER COMPLETE: CPT

## 2023-09-05 PROCEDURE — 92610 EVALUATE SWALLOWING FUNCTION: CPT

## 2023-09-05 PROCEDURE — 85027 COMPLETE CBC AUTOMATED: CPT | Performed by: INTERNAL MEDICINE

## 2023-09-05 PROCEDURE — 97166 OT EVAL MOD COMPLEX 45 MIN: CPT

## 2023-09-05 PROCEDURE — 80061 LIPID PANEL: CPT | Performed by: INTERNAL MEDICINE

## 2023-09-05 PROCEDURE — 99254 IP/OBS CNSLTJ NEW/EST MOD 60: CPT | Performed by: STUDENT IN AN ORGANIZED HEALTH CARE EDUCATION/TRAINING PROGRAM

## 2023-09-05 PROCEDURE — 97112 NEUROMUSCULAR REEDUCATION: CPT

## 2023-09-05 PROCEDURE — 83036 HEMOGLOBIN GLYCOSYLATED A1C: CPT | Performed by: INTERNAL MEDICINE

## 2023-09-05 PROCEDURE — 97535 SELF CARE MNGMENT TRAINING: CPT

## 2023-09-05 PROCEDURE — 93306 TTE W/DOPPLER COMPLETE: CPT | Performed by: INTERNAL MEDICINE

## 2023-09-05 PROCEDURE — 82948 REAGENT STRIP/BLOOD GLUCOSE: CPT

## 2023-09-05 PROCEDURE — 70551 MRI BRAIN STEM W/O DYE: CPT

## 2023-09-05 PROCEDURE — 70450 CT HEAD/BRAIN W/O DYE: CPT

## 2023-09-05 PROCEDURE — 25510000001 PERFLUTREN (DEFINITY) 8.476 MG IN SODIUM CHLORIDE (PF) 0.9 % 10 ML INJECTION: Performed by: INTERNAL MEDICINE

## 2023-09-05 PROCEDURE — 80048 BASIC METABOLIC PNL TOTAL CA: CPT | Performed by: INTERNAL MEDICINE

## 2023-09-05 RX ORDER — POTASSIUM CHLORIDE 750 MG/1
40 TABLET, FILM COATED, EXTENDED RELEASE ORAL EVERY 4 HOURS
Status: COMPLETED | OUTPATIENT
Start: 2023-09-05 | End: 2023-09-05

## 2023-09-05 RX ORDER — POTASSIUM CHLORIDE 750 MG/1
40 TABLET, FILM COATED, EXTENDED RELEASE ORAL EVERY 4 HOURS
Status: DISCONTINUED | OUTPATIENT
Start: 2023-09-05 | End: 2023-09-05

## 2023-09-05 RX ORDER — POTASSIUM CHLORIDE 7.45 MG/ML
10 INJECTION INTRAVENOUS
Status: DISCONTINUED | OUTPATIENT
Start: 2023-09-05 | End: 2023-09-05

## 2023-09-05 RX ORDER — ECHINACEA PURPUREA EXTRACT 125 MG
1 TABLET ORAL AS NEEDED
Status: DISCONTINUED | OUTPATIENT
Start: 2023-09-05 | End: 2023-09-10 | Stop reason: HOSPADM

## 2023-09-05 RX ADMIN — Medication 10 ML: at 00:28

## 2023-09-05 RX ADMIN — SENNOSIDES AND DOCUSATE SODIUM 2 TABLET: 50; 8.6 TABLET ORAL at 00:27

## 2023-09-05 RX ADMIN — POTASSIUM CHLORIDE 40 MEQ: 750 TABLET, EXTENDED RELEASE ORAL at 14:49

## 2023-09-05 RX ADMIN — ACETAMINOPHEN 650 MG: 325 TABLET, FILM COATED ORAL at 08:56

## 2023-09-05 RX ADMIN — ATORVASTATIN CALCIUM 80 MG: 80 TABLET, FILM COATED ORAL at 21:57

## 2023-09-05 RX ADMIN — ASPIRIN 325 MG: 325 TABLET ORAL at 21:57

## 2023-09-05 RX ADMIN — SENNOSIDES AND DOCUSATE SODIUM 2 TABLET: 50; 8.6 TABLET ORAL at 21:57

## 2023-09-05 RX ADMIN — ATORVASTATIN CALCIUM 80 MG: 80 TABLET, FILM COATED ORAL at 00:27

## 2023-09-05 RX ADMIN — Medication 10 ML: at 21:00

## 2023-09-05 RX ADMIN — POTASSIUM CHLORIDE 40 MEQ: 750 TABLET, EXTENDED RELEASE ORAL at 18:12

## 2023-09-05 RX ADMIN — PERFLUTREN 2 ML: 6.52 INJECTION, SUSPENSION INTRAVENOUS at 11:20

## 2023-09-05 RX ADMIN — Medication 10 ML: at 08:29

## 2023-09-05 RX ADMIN — ACETAMINOPHEN 650 MG: 325 TABLET, FILM COATED ORAL at 14:48

## 2023-09-05 NOTE — ED NOTES
Pt taken for repeat ct head. Still reports decreased sensation in right leg, and has drift down before 5 seconds on leg. Left side remains the same.  No facial droop noted, pt answers questions.

## 2023-09-05 NOTE — H&P
[facility]  HISTORY AND PHYSICAL   [facility]        Patient Identification:  Name: Heriberto Fields  Age: 58 y.o.  Sex: male  :  1965  MRN: [id number]                     Primary Care Physician: Courtney Croft APRN    Chief Complaint: Strokelike symptoms    History of Present Illness:   58-year-old male presents with complaints of left-sided weakness that started about 30 minutes prior to coming to the emergency room.  Has had a previous stroke with left-sided weakness of his left arm related to that event.  Chronic hypertension.  Complained of severe headache.  Evaluated in the emergency room and stroke service elected to treat with TNK.  Will require admission to intensive care post TNK for acute monitoring.  Mild improvement in left-sided weakness noted.  He was also having some previous double vision.    Past Medical History:  Past Medical History:   Diagnosis Date    GERD (gastroesophageal reflux disease)     Stroke      Past Surgical History:  History reviewed. No pertinent surgical history.   Home Meds:  (Not in a hospital admission)      Allergies:  No Known Allergies  Immunizations:  Immunization History   Administered Date(s) Administered    COVID-19 (PFIZER) Purple Cap Monovalent 2021    Tdap 2023     Social History:   Social History     Social History Narrative    Not on file     Social History     Tobacco Use    Smoking status: Never    Smokeless tobacco: Not on file   Substance Use Topics    Alcohol use: Yes     Comment: socially     Family History:  History reviewed. No pertinent family history.     Review of Systems  Denies fevers or chills  Denies nausea or vomiting  No new vision or hearing changes  No chest pain  No productive cough or shortness of breath  No diarrhea, hematemesis or hematochezia, no dysuria or frequency  Left sided weakness  No heat or cold intolerance  No skin rashes  No dizziness or confusion.  No seizure  "activity  No new anxiety or depression  12 system review of systems performed and all else negative    Objective:  tMax 24 hrs: Temp (24hrs), Av.7 °F (37.1 °C), Min:98.7 °F (37.1 °C), Max:98.7 °F (37.1 °C)    Vitals Ranges:   Temp:  [98.7 °F (37.1 °C)] 98.7 °F (37.1 °C)  Heart Rate:  [] 96  Resp:  [16-20] 20  BP: (111-135)/(72-83) 111/72      Exam:  /72   Pulse 96   Temp 98.7 °F (37.1 °C) (Oral)   Resp 20   Ht 175 cm (68.9\")   Wt 77.5 kg (170 lb 13.7 oz)   SpO2 94%   BMI 25.31 kg/m²     GENERAL APPEARANCE:   Well developed  Well nourished  No acute distress   EYES:    PERRL                                                                           Conjunctiva normal  Sclera non-icteric.  HENT:   Atraumatic, normocephalic  External ears and nose normal  Moist mucous membranes and no ulcers  NECK:  Thyroid not enlarged  Trachea midline   RESPIRATORY:    Nonlabored breathing   Normal breath sounds  No rales. No wheezing  No dullness  CARDIOVASCULAR:    RRR  Normal S1, S2  No murmur  Lower extremity edema: none    Peripheral pulses present.    GI:   Bowel sounds normal  Abdomen soft , non-distended, non-tender  No abdominal masses.    MUSCULOSKELETAL:  Normal movement of extremities  No tenderness, no deformities  No clubbing or cyanosis   Left-sided weakness  Skin:    No visible rashes  No palpable nodules.  Cap refill normal.  No mottling.   PSYCHIATRIC:  Speech and behavior appropriate  Normal mood and affect  Oriented to person, place and time  NEUROLOGIC:   Cranial nerves II through XII grossly intact.  Sensation intact.   Left-sided weakness and mild facial droop  .     Data Review:  Labs reviewed  Results    Collected Updated Procedure Result Status    2023 [place] Draw [[id number]]    Blood from Arm, Right    Final result Component Value   No component results          2023 Comprehensive Metabolic Panel [[id number]]    (Abnormal)   Blood " from Arm, Right    Final result Component Value Units   Glucose 241 High  mg/dL   BUN 8 mg/dL   Creatinine 1.14 mg/dL   Sodium 142 mmol/L   Potassium 3.2 Low   mmol/L   Chloride 105 mmol/L   CO2 18.6 Low  mmol/L   Calcium 9.0 mg/dL   Total Protein 7.5 g/dL   Albumin 4.5 g/dL   ALT (SGPT) 34 U/L   AST (SGOT) 25 U/L   Alkaline Phosphatase 73 U/L   Total Bilirubin 0.3 mg/dL   Globulin 3.0 gm/dL   A/G Ratio 1.5 g/dL   BUN/Creatinine Ratio 7.0    Anion Gap 18.4 High  mmol/L   eGFR 73.6 mL/min/1.73          09/04/2023 2102 09/04/2023 2149 Protime-INR [[id number]]   (Abnormal)   Blood from Arm, Right    Final result Component Value Units   Protime 12.1 Seconds   INR 0.88 Low            09/04/2023 2102 09/04/2023 2149 aPTT [[id number]]   Blood from Arm, Right    Final result Component Value Units   PTT 24.3 seconds          09/04/2023 2102 09/04/2023 2206 Single High Sensitivity Troponin T [[id number]]    Blood from Arm, Right    Final result Component Value Units   HS Troponin T 9 ng/L          09/04/2023 2102 09/04/2023 2142 Type & Screen [[id number]]   Blood from Arm, Right    In process Component Value   No component results          09/04/2023 2102 09/04/2023 2123 CBC Auto Differential [[id number]]   (Abnormal)   Blood from Arm, Right    Final result Component Value Units   WBC 10.26 10*3/mm3   RBC 5.14 10*6/mm3   Hemoglobin 16.3 g/dL   Hematocrit 48.8 %   MCV 94.9 fL   MCH 31.7 pg   MCHC 33.4 g/dL   RDW 13.3 %   RDW-SD 46.7 fl   MPV 9.4 fL   Platelets 300 10*3/mm3   Neutrophil % 49.6 %   Lymphocyte % 39.4 %   Monocyte % 8.0 %   Eosinophil % 2.6 %   Basophil % 0.3 %   Immature Grans % 0.1 %   Neutrophils, Absolute 5.09 10*3/mm3   Lymphocytes, Absolute 4.04 High  10*3/mm3   Monocytes, Absolute 0.82 10*3/mm3   Eosinophils, Absolute 0.27 10*3/mm3   Basophils, Absolute 0.03 10*3/mm3   Immature Grans, Absolute 0.01 10*3/mm3   nRBC 0.0 /100 WBC              Imaging reviewed  Chest x-ray reviewed  CT head  reviewed            Assessment:  Acute stroke: Status post TNK  Left-sided weakness secondary to stroke  Severe headache and hypertension  History of previous stroke        Plan:  Admit to the intensive care unit.  Post TNK stroke order set initiated.  Aspirin and high-dose statin for secondary stroke prevention.  Neurology consultation pending.  Control glucose.  Control blood pressure.          Adan Palma MD  [facility], [facility]  Pulmonary and Critical Care Medicine    9/4/2023  22:54 EDT

## 2023-09-05 NOTE — PLAN OF CARE
Goal Outcome Evaluation:              Outcome Evaluation: Patient seen for clinical swallow assessment. Slight L facial assymetry. Intensity of voice reduced, but daughter at the bedside said pt's voice at baseline. Pt refused  to this clinician. Throat clearing noted with ice, thins via cup, thins via cup as liquid wash with regular, and thins via straw. No overt s/s of aspiration with honey via cup/straw, nectar via straw, or puree. Multiple swallows with mech soft and regular solids. Grimace with regular. SLP recs mech soft, no mixed, and nectar, straws okay. Meds whole or crushed as tolerated with nectar or puree/pudding. Will follow for VFSS to further assess.

## 2023-09-05 NOTE — NURSING NOTE
Discussed MRI results with Dr. Ashford.  Notified him of current patient condition.  Per Dr. Ashford, ok to transfer to [facility] 24 hours after TNK.
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
## 2023-09-05 NOTE — CONSULTS
"Neurology Consult Note    Consult Date: 9/5/2023    Referring MD: No ref. provider found    Reason for Consult I have been asked to see the patient in neurological consultation to render advice and opinion regarding left-sided weakness    Heriberto Fields is a 58 y.o. male with past medical history of stroke 5 years ago with no residual deficits,, hypertension. Patient comes in with acute onset left arm weakness also left leg weakness which started around 30 minutes ago before arrival into the ER, last known normal around 8:30 PM.  In the ER patient complained of left arm and left leg weakness along with some left facial droop.  In the ER he had an NIH stroke scale of 7.  His CT head CTA head and neck and CTP were negative, CT head did not show any acute hypodensity or hyper density, CTA head and neck showed no large vessel occlusion or stenosis, CTP showed no core or penumbra.  Since patient has been having disabling deficits and within the window for TNK, and care was offered after reviewing inclusion and exclusion criteria.    No history of blood clots in legs or in chest.  Patient denies any previous history of stroke or TIAs  Social history patient does not smoke, drinks alcohol socially, no history of IV or recreational drug use  Family history of coronary artery disease but no stroke    Past Medical History:   Diagnosis Date    GERD (gastroesophageal reflux disease)     Stroke        Exam  /89   Pulse 72   Temp 98.3 °F (36.8 °C) (Oral)   Resp 16   Ht 160 cm (63\")   Wt 80.5 kg (177 lb 7.5 oz)   SpO2 98%   BMI 31.44 kg/m²   Gen: NAD, vitals reviewed  MS: oriented x3, recent/remote memory intact, normal attention/concentration, language intact, no neglect.  CN: visual acuity grossly normal, PERRL, EOMI, no facial droop, no dysarthria  Motor: 5/5 throughout upper and lower extremities, normal tone    DATA:    Lab Results   Component Value Date    GLUCOSE 241 (H) 09/04/2023    CALCIUM 9.0 " 09/04/2023     09/04/2023    K 3.2 (L) 09/04/2023    CO2 18.6 (L) 09/04/2023     09/04/2023    BUN 8 09/04/2023    CREATININE 1.14 09/04/2023    BCR 7.0 09/04/2023    ANIONGAP 18.4 (H) 09/04/2023     Lab Results   Component Value Date    WBC 10.26 09/04/2023    HGB 16.3 09/04/2023    HCT 48.8 09/04/2023    MCV 94.9 09/04/2023     09/04/2023       Lab review:   Sodium 142  Creatinine 1.14  Normal LFTs  Blood glucose 1 90-2 40      A1c 5.6    WBC 10.26  Hemoglobin 16.3 and platelets 300    Imaging review:     CT head no acute hypodensity or hyperdensity  CTA head and neck no large vessel occlusions or stenosis    CTP no core or penumbra    MRI brain pending    CT head done at 9/5/2023 at 4 AM showed no new hypodensity or hyperdensity    Diagnoses:  Left sided weakness concern for right MCA stroke s/p TNK    Other medical issues  Hypertension  Seasonal allergies    Pre-stroke MRS: 0  NIHSS: NIHSS:    Baseline  0-->Alert: keenly responsive  0-->Answers both questions correctly  0-->Performs both tasks correctly  0=normal  0=No visual loss  1=Minor paralysis (flattened nasolabial fold, asymmetric on smiling)  3-->No effort against gravity: limb falls  0-->No drift: limb holds 90 (or 45) degrees for full 10 secs  3-->No effort against gravity: limb falls  0-->No drift: limb holds 90 (or 45) degrees for full 10 secs  0=Absent  1=Mild to moderate sensory loss; patient feels pinprick is less sharp or is dull on the affected side; there is a loss of superficial pain with pinprick but patient is aware He is being touched  0=No aphasia, normal  0=Normal  0=No abnormality    Total score: 8    Patient is a 58-year-old male with history of hypertension, comes in with chief complaints of left-sided weakness which started 30 minutes prior to the arrival last known normal was 8:38 PM.  He was given TNK after CT head showed no acute hypodensity or hyperdensity, the head and neck did not show any acute vessel  stenosis or cutoff CT perfusion did not show any core or penumbra  Comment:     PLAN:  -ICU admission  -MARKUS pending  - Post-tnk recommendations.  Monitoring:  ·      ICU admission for 24 hours with routine Post TNK protocol   ·      Telemetry to assess for atrial fibrillation or other dysrhythmias  ·      BP and Neurochecks Q15 mins x 2 hrs from the start of TNK infusion, then Q30 mins x 6 hrs, then Q1H x 16 hrs, then can de-escalate to routine  ·      Obtain STAT CT Head for any signs of hemorrhage such as any acute neurological deterioration, change in mental status, new headache, acute hypertension, nausea and vomiting, and hiccups and call Neurology provider  Protocol Parameters:  ·      Maintain Blood Glucose <180  ·      Agressively treat fever >38°C with antipyretics   ·      Avoid unnecessary arterial or central venous punctures/lines, IM injections, nasogastric tubes, and johnson catheters for 24 hrs  ·      Hold chemical DVT prophylaxis, antiplatelets, and anticoagulants for 24 hrs pending repeat head CT  Blood Pressure:  ·      Goal MAPs >65 mm Hg  ·      Avoid hypotension  ·      Treat BP >180/105 mm Hg using IV medications (ex. nicardipine)  ·      Treat intravascular volume depletion with NSS- avoid hypotonic fluids or fluids containing dextrose  Diagnostic Studies  ·      Repat CT Head w/o contrast 24h from time of tPA infusion   ·      MRI Brain wo contrast to evaluate stroke site/burden  ·      Labs: HgbA1c, Lipid Panel  Medications:  ·      High dose statin therapy with: Atorvastatin   Rehab:  ·      NPO pending Nursing Bedside Dysphagia screen  ·      PT/OT/ST consults when appropriate        MDM   Reviewed: Previous charts, nursing notes and vitals   Reviewed: Previous labs and CT scan    Interpretation: Labs and CT scan   Total time providing critical care is :30-74 minutes. This excluded time spent performing separately reportable procedures and services  Consults :Neurology/Stroke    Salvador  Antony Ashford MD  Neuro Hospitalist /Vascular Neurology.

## 2023-09-05 NOTE — ED PROVIDER NOTES
" EMERGENCY DEPARTMENT ENCOUNTER    Room Number:  03/03  Date of encounter:  9/4/2023  PCP: Courtney Croft APRN  Patient Care Team:  Courtney Croft APRN as PCP - General (Nurse Practitioner)   Independent Historians: Patient    HPI:  Chief Complaint: Left sided weakness  A complete HPI/ROS/PMH/PSH/SH/FH are unobtainable due to: N/A    Chronic or social conditions impacting patient care (social determinants of health): None    Context: Heriberto Fields is a 58 y.o. right-hand-dominant male with past medical history of hypertension and prior CVA with who arrives to the ED with complaint of left arm weakness.  Patient states that approximately 30 minutes prior to arrival he started develop worsening left arm weakness as well as a \"cramping\" feeling to the left side of his body.  Also noted some double vision.    Review of prior external notes (non-ED): ER note from 6/1/2023 was noted that patient had equal strength of arms and legs bilaterally    Review of prior external test results outside of this encounter: No previous head imaging in our system    PAST MEDICAL HISTORY  Active Ambulatory Problems     Diagnosis Date Noted    No Active Ambulatory Problems     Resolved Ambulatory Problems     Diagnosis Date Noted    No Resolved Ambulatory Problems     Past Medical History:   Diagnosis Date    GERD (gastroesophageal reflux disease)     Stroke        The patient has started, but not completed, their COVID-19 vaccination series.    PAST SURGICAL HISTORY  History reviewed. No pertinent surgical history.      FAMILY HISTORY  History reviewed. No pertinent family history.      SOCIAL HISTORY  Social History     Socioeconomic History    Marital status:    Tobacco Use    Smoking status: Never   Substance and Sexual Activity    Alcohol use: Yes     Comment: socially    Drug use: Never         ALLERGIES  Patient has no known allergies.        REVIEW OF SYSTEMS  Review of Systems     All systems reviewed " and negative except for those discussed in HPI.       PHYSICAL EXAM    I have reviewed the triage vital signs and nursing notes.    ED Triage Vitals   Temp Heart Rate Resp BP SpO2   09/04/23 2053 09/04/23 2043 09/04/23 2043 09/04/23 2051 09/04/23 2043   98.7 °F (37.1 °C) (!) 125 16 135/83 96 %      Temp src Heart Rate Source Patient Position BP Location FiO2 (%)   09/04/23 2053 -- 09/04/23 2051 09/04/23 2051 --   Oral  Sitting Right arm        Physical Exam    GENERAL: alert and oriented x4, not distressed  HENT: normocephalic, atraumatic, moist mucous membranes  EYES: no scleral icterus, PERRL, EOMI  CV: regular rhythm, tachycardic  RESPIRATORY: normal effort, CTAB  ABDOMEN: soft/nontender  MUSCULOSKELETAL: no deformity  NEURO: alert, moves all extremities, patient has a slight facial droop at the mouth, left  is weak, left arm is weak, patient unable to lift left leg as high as the right, NIHSS currently 4, follows commands  SKIN: warm, dry, no rash   Psych: Appropriate mood and affect      Nursing notes and vital signs reviewed      LAB RESULTS  Recent Results (from the past 24 hour(s))   POC Glucose Once    Collection Time: 09/04/23  8:45 PM    Specimen: Blood   Result Value Ref Range    Glucose 211 (H) 70 - 130 mg/dL   Comprehensive Metabolic Panel    Collection Time: 09/04/23  9:02 PM    Specimen: Arm, Right; Blood   Result Value Ref Range    Glucose 241 (H) 65 - 99 mg/dL    BUN 8 8 - 23 mg/dL    Creatinine 1.14 0.76 - 1.27 mg/dL    Sodium 142 136 - 145 mmol/L    Potassium 3.2 (L) 3.5 - 5.2 mmol/L    Chloride 105 98 - 107 mmol/L    CO2 18.6 (L) 22.0 - 29.0 mmol/L    Calcium 9.0 8.6 - 10.5 mg/dL    Total Protein 7.5 6.0 - 8.5 g/dL    Albumin 4.5 3.5 - 5.2 g/dL    ALT (SGPT) 34 1 - 41 U/L    AST (SGOT) 25 1 - 40 U/L    Alkaline Phosphatase 73 39 - 117 U/L    Total Bilirubin 0.3 0.0 - 1.2 mg/dL    Globulin 3.0 gm/dL    A/G Ratio 1.5 g/dL    BUN/Creatinine Ratio 7.0 7.0 - 25.0    Anion Gap 18.4 (H) 5.0 - 15.0  mmol/L    eGFR 73.6 >60.0 mL/min/1.73   Protime-INR    Collection Time: 09/04/23  9:02 PM    Specimen: Arm, Right; Blood   Result Value Ref Range    Protime 12.1 11.7 - 14.2 Seconds    INR 0.88 (L) 0.90 - 1.10   aPTT    Collection Time: 09/04/23  9:02 PM    Specimen: Arm, Right; Blood   Result Value Ref Range    PTT 24.3 22.7 - 35.4 seconds   Single High Sensitivity Troponin T    Collection Time: 09/04/23  9:02 PM    Specimen: Arm, Right; Blood   Result Value Ref Range    HS Troponin T 9 <15 ng/L   Type & Screen    Collection Time: 09/04/23  9:02 PM    Specimen: Arm, Right; Blood   Result Value Ref Range    ABO Type A     RH type Positive     Antibody Screen Negative     T&S Expiration Date 9/7/2023 11:59:59 PM    Green Top (Gel)    Collection Time: 09/04/23  9:02 PM   Result Value Ref Range    Extra Tube Hold for add-ons.    Lavender Top    Collection Time: 09/04/23  9:02 PM   Result Value Ref Range    Extra Tube hold for add-on    Gold Top - SST    Collection Time: 09/04/23  9:02 PM   Result Value Ref Range    Extra Tube Hold for add-ons.    Light Blue Top    Collection Time: 09/04/23  9:02 PM   Result Value Ref Range    Extra Tube Hold for add-ons.    CBC Auto Differential    Collection Time: 09/04/23  9:02 PM    Specimen: Arm, Right; Blood   Result Value Ref Range    WBC 10.26 3.40 - 10.80 10*3/mm3    RBC 5.14 4.14 - 5.80 10*6/mm3    Hemoglobin 16.3 13.0 - 17.7 g/dL    Hematocrit 48.8 37.5 - 51.0 %    MCV 94.9 79.0 - 97.0 fL    MCH 31.7 26.6 - 33.0 pg    MCHC 33.4 31.5 - 35.7 g/dL    RDW 13.3 12.3 - 15.4 %    RDW-SD 46.7 37.0 - 54.0 fl    MPV 9.4 6.0 - 12.0 fL    Platelets 300 140 - 450 10*3/mm3    Neutrophil % 49.6 42.7 - 76.0 %    Lymphocyte % 39.4 19.6 - 45.3 %    Monocyte % 8.0 5.0 - 12.0 %    Eosinophil % 2.6 0.3 - 6.2 %    Basophil % 0.3 0.0 - 1.5 %    Immature Grans % 0.1 0.0 - 0.5 %    Neutrophils, Absolute 5.09 1.70 - 7.00 10*3/mm3    Lymphocytes, Absolute 4.04 (H) 0.70 - 3.10 10*3/mm3    Monocytes,  Absolute 0.82 0.10 - 0.90 10*3/mm3    Eosinophils, Absolute 0.27 0.00 - 0.40 10*3/mm3    Basophils, Absolute 0.03 0.00 - 0.20 10*3/mm3    Immature Grans, Absolute 0.01 0.00 - 0.05 10*3/mm3    nRBC 0.0 0.0 - 0.2 /100 WBC   ECG 12 Lead Stroke Evaluation    Collection Time: 09/04/23  9:27 PM   Result Value Ref Range    QT Interval 387 ms    QTC Interval 507 ms       Ordered the above labs and independently reviewed and interpreted the results by me.        RADIOLOGY  XR Chest 1 View    Result Date: 9/4/2023  SINGLE VIEW OF THE CHEST  HISTORY: Congestion  COMPARISON: None available.  FINDINGS: There does appear to be cardiomegaly. There is no vascular congestion. No pneumothorax or definite pleural effusion is seen. The patient has bibasilar atelectasis.      Bibasilar atelectasis.  This report was finalized on 9/4/2023 10:29 PM by Dr. Colette Hurd M.D.       I ordered the above noted radiological studies.  These were independently interpreted and reviewed by me.  See dictation for official radiology interpretation.      PROCEDURES    Procedures      MEDICATIONS GIVEN IN ER    Medications   sodium chloride 0.9 % flush 10 mL (has no administration in time range)   sodium chloride 0.9 % bolus 1,000 mL (1,000 mL Intravenous New Bag 9/4/23 7598)   sodium chloride 0.9 % flush 10 mL (has no administration in time range)   sodium chloride 0.9 % flush 10 mL (has no administration in time range)   sodium chloride 0.9 % infusion 40 mL (has no administration in time range)   acetaminophen (TYLENOL) tablet 650 mg (has no administration in time range)     Or   acetaminophen (TYLENOL) suppository 650 mg (has no administration in time range)   HYDROcodone-acetaminophen (NORCO) 5-325 MG per tablet 1 tablet (has no administration in time range)   fentaNYL citrate (PF) (SUBLIMAZE) injection 25 mcg (has no administration in time range)   aspirin tablet 325 mg (has no administration in time range)     Or   aspirin suppository 300 mg  (has no administration in time range)   atorvastatin (LIPITOR) tablet 80 mg (has no administration in time range)   ondansetron (ZOFRAN) injection 4 mg (has no administration in time range)   bisacodyl (DULCOLAX) suppository 10 mg (has no administration in time range)   aluminum-magnesium hydroxide-simethicone (MAALOX MAX) 400-400-40 MG/5ML suspension 7.5 mL (has no administration in time range)   docusate sodium (COLACE) capsule 100 mg (has no administration in time range)   aluminum-magnesium hydroxide-simethicone (MAALOX MAX) 400-400-40 MG/5ML suspension 15 mL (has no administration in time range)   sennosides-docusate (PERICOLACE) 8.6-50 MG per tablet 2 tablet (has no administration in time range)     And   polyethylene glycol (MIRALAX) packet 17 g (has no administration in time range)     And   bisacodyl (DULCOLAX) EC tablet 5 mg (has no administration in time range)     And   bisacodyl (DULCOLAX) suppository 10 mg (has no administration in time range)   acetaminophen (TYLENOL) tablet 650 mg (has no administration in time range)     Or   acetaminophen (TYLENOL) suppository 650 mg (has no administration in time range)   ondansetron (ZOFRAN) tablet 4 mg (has no administration in time range)     Or   ondansetron (ZOFRAN) injection 4 mg (has no administration in time range)   iopamidol (ISOVUE-370) 76 % injection 150 mL (150 mL Intravenous Given 9/4/23 2117)   sodium chloride 0.9 % flush 10 mL (10 mL Intravenous Given 9/4/23 2219)     Followed by   tenecteplase for stroke (TNKASE) injection 19 mg (19 mg Intravenous Given 9/4/23 2219)     Followed by   sodium chloride 0.9 % flush 10 mL (10 mL Intravenous Given 9/4/23 2222)   potassium chloride (K-DUR,KLOR-CON) ER tablet 40 mEq (40 mEq Oral Given 9/4/23 2252)         PROGRESS, DATA ANALYSIS, CONSULTS, AND MEDICAL DECISION MAKING    All labs have been independently reviewed by me.  All radiology studies have been reviewed by me and discussed with radiologist dictating  the report.   EKG's independently viewed and interpreted by me.  Discussion below represents my analysis of pertinent findings related to patient's condition, differential diagnosis, treatment plan and final disposition.    DDx:  Includes, but is not limited to stroke, TIA, cervical radiculopathy    After my initial assessment I am concerned about stroke and patient may need hospitalization due to neurology consultation and thrombolysis.    ED Course as of 09/04/23 2306   Mon Sep 04, 2023   2100 Patient history, ER presentation and evaluation discussed with stroke neurology, recommended we proceed with CT imaging and perfusion. [ART]   2130 EKG          EKG time: 2127  Rhythm/Rate: Sinus tachycardia at 103 bpm  P waves and MS: Borderline prolonged MS interval  QRS, axis: Prolonged QT interval  ST and T waves: No acute ST/T wave changes    Interpreted Contemporaneously by me, independently viewed  No prior EKG for comparison.   [ART]   2152 Discussed with Dr. Ashford, Stroke Neurology, discussed patient's clinical course and find today, treatment modalities, he recommends TNK given CT scans without overt findings and patient's symptoms.  [DC]   2156 WBC: 10.26 [ART]   2156 Hemoglobin: 16.3 [ART]   2156 Hematocrit: 48.8 [ART]   2156 Platelets: 300 [ART]   2200 Conversation with Dr. Sage and the patient through the  discussing all the risks and benefits of TNK including increased risk of bleeding and possible death, patient expresses understanding and would like to proceed with TNK. [ART]   2240 Glucose(!): 241 [ART]   2240 BUN: 8 [ART]   2240 Sodium: 142 [ART]   2240 Potassium(!): 3.2 [ART]   2240 Chloride: 105 [ART]   2240 CO2(!): 18.6 [ART]   2240 HS Troponin T: 9 [ART]   2248 Patient history, ER presentation and evaluation as well as treatment plan with TNK discussed with pulmonology, Dr. Palma, agrees to admit to the ICU. [ART]   2252 Patient's left arm weakness and left-sided facial droop have improved slightly  since TNK administration. [ART]      ED Course User Index  [DC] Rancho Sage MD  [ART] Jose Guadalupe Garcias PA       MDM: Patient has had acute stroke protocol imaging performed, consultation with neurology and has been given TNK.  Patient will be admitted to the ICU for further evaluation monitoring and MRI imaging.    PPE:  The patient wore a mask and I wore a mask and all appropriate PPE throughout the entire patient encounter.      AS OF 23:06 EDT VITALS:    BP - 116/82  HR - 96  TEMP - 98.7 °F (37.1 °C) (Oral)  O2 SATS - 94%      DIAGNOSIS  Final diagnoses:   Left arm weakness   Hyperglycemia   Hypokalemia   Facial droop   History of stroke         DISPOSITION  ADMISSION    Discussed treatment plan and reason for admission with pt/family and admitting physician.  Pt/family voiced understanding of the plan for admission for further testing/treatment as needed.       Note Disclaimer: At [facility], we believe that sharing information builds trust and better relationships. You are receiving this note because you recently visited [facility]. It is possible you will see health information before a provider has talked with you about it. This kind of information can be easy to misunderstand. To help you fully understand what it means for your health, we urge you to discuss this note with your provider.       Jose Guadalupe Garcias PA  09/04/23 [id number]

## 2023-09-05 NOTE — PROGRESS NOTES
"      [facility]         Dr Ortiz Sayied   LOS: 1 day   Patient Care Team:  Courtney Croft APRN as PCP - General (Nurse Practitioner)    Chief Complaint: Left hemiparesis acute CVA status post TNK    Interval History: Resting comfortably with dense left hemiparesis.    REVIEW OF SYSTEMS:   CARDIOVASCULAR: No chest pain, chest pressure or chest discomfort. No palpitations or edema.   RESPIRATORY: No shortness of breath, cough or sputum.   GASTROINTESTINAL: No anorexia, nausea, vomiting or diarrhea. No abdominal pain or blood.   HEMATOLOGIC: No bleeding or bruising.     Ventilator/Non-Invasive Ventilation Settings (From admission, onward)      None              Vital Signs  Temp:  [97.9 °F (36.6 °C)-98.7 °F (37.1 °C)] 97.9 °F (36.6 °C)  Heart Rate:  [] 58  Resp:  [16-20] 16  BP: ()/() 137/120    Intake/Output Summary (Last 24 hours) at 9/5/2023 1116  Last data filed at 9/5/2023 0500  Gross per 24 hour   Intake --   Output 600 ml   Net -600 ml     Flowsheet Rows      Flowsheet Row First Filed Value   Admission Height 175 cm (68.9\") Documented at 09/04/2023 2052   Admission Weight 90.7 kg (200 lb) Documented at 09/04/2023 2052            Physical Exam:  Patient is examined using the personal protective equipment as per guidelines from infection control for this particular patient as enacted.  Hand hygiene was performed before and after patient interaction.   General Appearance:    Alert, cooperative, in no acute distress.  Following simple commands  ENT Mallampati between 3 and 4 no nasal congestion  Neck midline trachea, no thyromegaly   Lungs:     Clear to auscultation, respirations regular, even and                  unlabored    Heart:    Regular rhythm and normal rate, normal S1 and S2, no            murmur, no gallop, no rub, no click   Chest Wall:    No abnormalities observed   Abdomen:     Normal bowel sounds, no masses, no organomegaly, soft        nontender, nondistended, no " guarding, no rebound                tenderness   Extremities:   Moves all extremities well, no edema, no cyanosis, no             redness  CNS left hemiparesis  Skin no rashes no nodules  Musculoskeletal no cyanosis no clubbing normal range of motion     Results Review:        Results from last 7 days   Lab Units 09/05/23 0344 09/04/23 2102   SODIUM mmol/L 141 142   POTASSIUM mmol/L 3.4* 3.2*   CHLORIDE mmol/L 106 105   CO2 mmol/L 21.9* 18.6*   BUN mg/dL 5* 8   CREATININE mg/dL 0.89 1.14   GLUCOSE mg/dL 131* 241*   CALCIUM mg/dL 8.1* 9.0     Results from last 7 days   Lab Units 09/04/23  2102   HSTROP T ng/L 9     Results from last 7 days   Lab Units 09/05/23 0344 09/04/23 2102   WBC 10*3/mm3 5.89 10.26   HEMOGLOBIN g/dL 13.7 16.3   HEMATOCRIT % 41.0 48.8   PLATELETS 10*3/mm3 221 300     Results from last 7 days   Lab Units 09/04/23 2102   INR  0.88*   APTT seconds 24.3     Results from last 7 days   Lab Units 09/05/23  0344   CHOLESTEROL mg/dL 210*         Results from last 7 days   Lab Units 09/05/23  0344   CHOLESTEROL mg/dL 210*   TRIGLYCERIDES mg/dL 188*   HDL CHOL mg/dL 53   LDL CHOL mg/dL 124*           I reviewed the patient's new clinical results.  I personally viewed and interpreted the patient's chest x-ray.        Medication Review:   aspirin, 325 mg, Oral, Daily   Or  aspirin, 300 mg, Rectal, Daily  atorvastatin, 80 mg, Oral, Nightly  docusate sodium, 100 mg, Oral, Daily  potassium chloride ER, 40 mEq, Oral, Q4H  senna-docusate sodium, 2 tablet, Oral, BID  sodium chloride, 10 mL, Intravenous, Q12H             ASSESSMENT:   Acute stroke: Status post TNK  Left-sided weakness secondary to stroke  Severe headache and hypertension  History of previous stroke  Hypokalemia    PLAN:  Neurochecks per protocol  Post TNK orders  MRI ordered per neurology  Blood pressure management treat blood pressure of greater than 180/105  Repeat CT head stable  Replace potassium per protocol  Stroke work-up per  neurology.      Diana Tirado MD  09/05/23  11:16 EDT

## 2023-09-05 NOTE — THERAPY EVALUATION
Patient Name: Heriberto Fields  : 1965    MRN: [id number]                              Today's Date: 2023       Admit Date: 2023    Visit Dx:     ICD-10-CM ICD-9-CM   1. Left arm weakness  R29.898 729.89   2. Hyperglycemia  R73.9 790.29   3. Hypokalemia  E87.6 276.8   4. Facial droop  R29.810 781.94   5. History of stroke  Z86.73 V12.54     Patient Active Problem List   Diagnosis    Stroke    Left arm weakness     Past Medical History:   Diagnosis Date    GERD (gastroesophageal reflux disease)     Stroke      History reviewed. No pertinent surgical history.   General Information       Row Name 23 1408          OT Time and Intention    Document Type evaluation  -RB     Mode of Treatment individual therapy;occupational therapy  two separate sessions today due to ECHO at bedside and needing to transfer off the unit for a MRI  -RB       Row Name 23 1408          General Information    Patient Profile Reviewed yes  -RB     Existing Precautions/Restrictions fall  -RB     Barriers to Rehab medically complex  -RB       Row Name 23 1408          Living Environment    People in Home spouse;child(diana), dependent  -RB       Row Name 23 1408          Home Main Entrance    Number of Stairs, Main Entrance none  -RB       Row Name 23 1408          Cognition    Orientation Status (Cognition) oriented x 3;other (see comments)  mild facial droop and throat clearing today  -RB       Row Name 23 1408          Safety Issues, Functional Mobility    Safety Issues Affecting Function (Mobility) safety precautions follow-through/compliance;awareness of need for assistance;sequencing abilities;safety precaution awareness  -RB     Impairments Affecting Function (Mobility) balance;coordination;endurance/activity tolerance;grasp;motor control;strength;range of motion (ROM)  -RB               User Key  (r) = Recorded By, (t) = Taken By, (c) = Cosigned By      Initials Name Provider  Type    RB Becky Raymond OT Occupational Therapist                     Mobility/ADL's       Row Name 09/05/23 1410          Bed Mobility    Bed Mobility rolling left  -RB     Rolling Left [place] (Bed Mobility) moderate assist (50% patient effort);1 person assist;verbal cues  -RB     Comment, (Bed Mobility) difficulty rolling in bed in prep for a ECHO - L side weakness  -RB       Row Name 09/05/23 1410          Transfers    Comment, (Transfers) Bed rest orders present  -RB       Row Name 09/05/23 1410          Functional Mobility    Functional Mobility- Ind. Level unable to perform;not tested  -RB       Row Name 09/05/23 1410          Activities of Daily Living    BADL Assessment/Intervention grooming;feeding  -RB       Row Name 09/05/23 1410          Grooming Assessment/Training    [place] Level (Grooming) grooming skills;minimum assist (75% patient effort)  -RB     Position (Grooming) supine  -RB     Comment, (Grooming) increased time - primary use of RUE due to L  impairments  -RB       Row Name 09/05/23 1410          Self-Feeding Assessment/Training    [place] Level (Feeding) feeding skills;set up  -RB     Position (Self-Feeding) supine  -RB     Comment, (Feeding) use of RUE, able to minimally grasp a pudidng cup with his L  -RB               User Key  (r) = Recorded By, (t) = Taken By, (c) = Cosigned By      Initials Name Provider Type    RB Becky Raymond OT Occupational Therapist                   Obj/Interventions       Row Name 09/05/23 1416          Sensory Assessment (Somatosensory)    Sensory Assessment (Somatosensory) sensation intact  -RB       Row Name 09/05/23 1416          Vision Assessment/Intervention    Visual Impairment/Limitations WFL  -RB       Row Name 09/05/23 1416          Range of Motion Comprehensive    Comment, General Range of Motion ALKA DIETZ WFL  -RB       Row Name 09/05/23 1416          Strength Comprehensive (MMT)    Comment, General Manual Muscle Testing  (MMT) Assessment LUE grossly 1/5, LLE very difficulty with gravity eliminated positions.  -RB       Row Name 09/05/23 1416          Balance    Comment, Balance Unable to test balance  -RB               User Key  (r) = Recorded By, (t) = Taken By, (c) = Cosigned By      Initials Name Provider Type    Becky Lutz OT Occupational Therapist                   Goals/Plan       Row Name 09/05/23 1418          Bed Mobility Goal 1 (OT)    Activity/Assistive Device (Bed Mobility Goal 1, OT) bed mobility activities, all  -RB     [place] Level/Cues Needed (Bed Mobility Goal 1, OT) supervision required  -RB     Time Frame (Bed Mobility Goal 1, OT) short term goal (STG);2 weeks  -RB     Progress/Outcomes (Bed Mobility Goal 1, OT) continuing progress toward goal  -RB       Row Name 09/05/23 1418          Transfer Goal 1 (OT)    Activity/Assistive Device (Transfer Goal 1, OT) transfers, all  -RB     [place] Level/Cues Needed (Transfer Goal 1, OT) supervision required  -RB     Progress/Outcome (Transfer Goal 1, OT) good progress toward goal  -RB       Row Name 09/05/23 1418          Bathing Goal 1 (OT)    [place] Level/Cues Needed (Bathing Goal 1, OT) supervision required  -RB     Time Frame (Bathing Goal 1, OT) short term goal (STG);2 weeks  -RB     Progress/Outcomes (Bathing Goal 1, OT) continuing progress toward goal  -RB       Row Name 09/05/23 1418          Dressing Goal 1 (OT)    Activity/Device (Dressing Goal 1, OT) dressing skills, all  -RB     [place]/Cues Needed (Dressing Goal 1, OT) supervision required  -RB     Time Frame (Dressing Goal 1, OT) short term goal (STG);2 weeks  -RB     Progress/Outcome (Dressing Goal 1, OT) continuing progress toward goal  -RB       Row Name 09/05/23 1418          Toileting Goal 1 (OT)    Activity/Device (Toileting Goal 1, OT) toileting skills, all  -RB     [place] Level/Cues Needed (Toileting Goal 1, OT) supervision required  -RB     Time Frame (Toileting  Goal 1, OT) short term goal (STG);2 weeks  -RB     Progress/Outcome (Toileting Goal 1, OT) continuing progress toward goal  -RB       Row Name 09/05/23 1418          Grooming Goal 1 (OT)    Activity/Device (Grooming Goal 1, OT) grooming skills, all  -RB     [place] (Grooming Goal 1, OT) supervision required  -RB     Time Frame (Grooming Goal 1, OT) short term goal (STG);2 weeks  -RB     Progress/Outcome (Grooming Goal 1, OT) continuing progress toward goal  -RB       Row Name 09/05/23 1418          Self-Feeding Goal 1 (OT)    Activity/Device (Self-Feeding Goal 1, OT) self-feeding skills, all  -RB     [place] Level/Cues Needed (Self-Feeding Goal 1, OT) supervision required  -RB     Time Frame (Self-Feeding Goal 1, OT) short term goal (STG);2 weeks  -RB     Progress/Outcomes (Self-Feeding Goal 1, OT) continuing progress toward goal  -RB       Row Name 09/05/23 1418          Strength Goal 1 (OT)    Strength Goal 1 (OT) Increase LUE strength to 3/5  -RB     Time Frame (Strength Goal 1, OT) short term goal (STG);2 weeks  -RB     Progress/Outcome (Strength Goal 1, OT) continuing progress toward goal  -RB       Row Name 09/05/23 1418          Therapy Assessment/Plan (OT)    Planned Therapy Interventions (OT) transfer/mobility retraining;strengthening exercise;ROM/therapeutic exercise;activity tolerance training;adaptive equipment training;BADL retraining;functional balance retraining;occupation/activity based interventions;patient/caregiver education/training;neuromuscular control/coordination retraining  -RB               User Key  (r) = Recorded By, (t) = Taken By, (c) = Cosigned By      Initials Name Provider Type    RB Becky Raymond, OT Occupational Therapist                   Clinical Impression       Row Name 09/05/23 1417          Pain Assessment    Pre/Posttreatment Pain Comment Generalized headache pain  -RB     Pain Intervention(s) Repositioned  -RB       Row Name 09/05/23 1417          Plan of Care  Review    Plan of Care Reviewed With patient  -RB     Progress improving  -RB     Outcome Evaluation The pt was admitted to [facility] 2/2 to L hemiparesis (TNK). Pmhx significant for a prior stroke x5 years ago and HTN. The pt reports living with his wife and teenage children. He works full time and lives in a single level home. OT bed level eval requested today to address feeding. LUE strength 1/5, very weak. AAROM exercises completed. Difficulty with self feeding and grooming tasks present - able to compensate using his RUE with increased time. Assist provided with rolling in prep for a ECHO - Mod A due to LLE weakness. Acute rehab recommended.  -RB       Row Name 09/05/23 1417          Therapy Assessment/Plan (OT)    Rehab Potential (OT) good, to achieve stated therapy goals  -RB     Criteria for Skilled Therapeutic Interventions Met (OT) yes;skilled treatment is necessary  -RB     Therapy Frequency (OT) 5 times/wk  -RB       Row Name 09/05/23 1417          Therapy Plan Review/Discharge Plan (OT)    Anticipated Discharge Disposition (OT) inpatient rehabilitation facility  -RB       Row Name 09/05/23 1417          Vital Signs    O2 Delivery Pre Treatment room air  -RB     O2 Delivery Intra Treatment room air  -RB     O2 Delivery Post Treatment room air  -RB     Pre Patient Position Supine  -RB     Intra Patient Position Supine  -RB     Post Patient Position Side Lying  -RB       Row Name 09/05/23 1417          Positioning and Restraints    Pre-Treatment Position in bed  -RB     Post Treatment Position bed  -RB     In Bed notified nsg;supine;call light within reach;encouraged to call for assist;exit alarm on;fowlers;legs elevated  -RB               User Key  (r) = Recorded By, (t) = Taken By, (c) = Cosigned By      Initials Name Provider Type    RB Becky Raymond, OT Occupational Therapist                   Outcome Measures       Row Name 09/05/23 1419          How much help from another is currently needed...     Putting on and taking off regular lower body clothing? 1  -RB     Bathing (including washing, rinsing, and drying) 1  -RB     Toileting (which includes using toilet bed pan or urinal) 1  -RB     Putting on and taking off regular upper body clothing 1  -RB     Taking care of personal grooming (such as brushing teeth) 2  -RB     Eating meals 2  -RB     AM-PAC 6 Clicks Score (OT) 8  -RB       Row Name 09/05/23 0856          How much help from another person do you currently need...    Turning from your back to your side while in flat bed without using bedrails? 3  -KS     Moving from lying on back to sitting on the side of a flat bed without bedrails? 2  -KS     Moving to and from a bed to a chair (including a wheelchair)? 2  -KS     Standing up from a chair using your arms (e.g., wheelchair, bedside chair)? 2  -KS     Climbing 3-5 steps with a railing? 2  -KS     To walk in hospital room? 2  bedrest  -KS     AM-PAC 6 Clicks Score (PT) 13  -KS     Highest level of mobility 4 --> Transferred to chair/commode  -KS       Row Name 09/05/23 1419          Modified Edelmira Scale    Modified [place]ira Scale 5 - Severe disability.  Bedridden, incontinent, and requiring constant nursing care and attention.  -       Row Name 09/05/23 1419          Functional Assessment    Outcome Measure Options AM-PAC 6 Clicks Daily Activity (OT);Modified [place]  -RB               User Key  (r) = Recorded By, (t) = Taken By, (c) = Cosigned By      Initials Name Provider Type    Aubree Sanchez RN Registered Nurse    Becky Lutz OT Occupational Therapist                    Occupational Therapy Education       Title: PT OT SLP Therapies (In Progress)       Topic: Occupational Therapy (Not Started)       Point: ADL training (Not Started)       Description:   Instruct learner(s) on proper safety adaptation and remediation techniques during self care or transfers.   Instruct in proper use of assistive devices.                  Learner  Progress:  Not documented in this visit.              Point: Home exercise program (Not Started)       Description:   Instruct learner(s) on appropriate technique for monitoring, assisting and/or progressing therapeutic exercises/activities.                  Learner Progress:  Not documented in this visit.              Point: Precautions (Not Started)       Description:   Instruct learner(s) on prescribed precautions during self-care and functional transfers.                  Learner Progress:  Not documented in this visit.              Point: Body mechanics (Not Started)       Description:   Instruct learner(s) on proper positioning and spine alignment during self-care, functional mobility activities and/or exercises.                  Learner Progress:  Not documented in this visit.                                  OT Recommendation and Plan  Planned Therapy Interventions (OT): transfer/mobility retraining, strengthening exercise, ROM/therapeutic exercise, activity tolerance training, adaptive equipment training, BADL retraining, functional balance retraining, occupation/activity based interventions, patient/caregiver education/training, neuromuscular control/coordination retraining  Therapy Frequency (OT): 5 times/wk  Plan of Care Review  Plan of Care Reviewed With: patient  Progress: improving  Outcome Evaluation: The pt was admitted to [facility] 2/2 to L hemiparesis (TNK). Pmhx significant for a prior stroke x5 years ago and HTN. The pt reports living with his wife and teenage children. He works full time and lives in a single level home. OT bed level eval requested today to address feeding. LUE strength 1/5, very weak. AAROM exercises completed. Difficulty with self feeding and grooming tasks present - able to compensate using his RUE with increased time. Assist provided with rolling in prep for a ECHO - Mod A due to LLE weakness. Acute rehab recommended.     Time Calculation:   Evaluation Complexity (OT)  Review  Occupational Profile/Medical/Therapy History Complexity: expanded/moderate complexity  Assessment, Occupational Performance/Identification of Deficit Complexity: 5 or more performance deficits  Clinical Decision Making Complexity (OT): detailed assessment/moderate complexity  Overall Complexity of Evaluation (OT): high complexity     Time Calculation- OT       Row Name 09/05/23 1408 09/05/23 1407 09/05/23 1028       Time Calculation- OT    OT Start Time 1240  -RB 1100  -RB --    OT Stop Time 1300  -RB 1130  -RB --    OT Time Calculation (min) 20 min  -RB 30 min  -RB --    Total Timed Code Minutes- OT -- 30 minute(s)  -RB --    OT Received On 09/05/23  -RB 09/05/23  -RB --    OT - Next Appointment 09/06/23  -RB 09/06/23  -RB 09/06/23  -RD    OT Goal Re-Cert Due Date -- 09/19/23  -RB --       Timed Charges    51878 -  OT Neuromuscular Reeducation Minutes -- 10  -RB --    81041 - OT Self Care/Mgmt Minutes -- 20  -RB --       Untimed Charges    OT Eval/Re-eval Minutes 20  -RB -- --       Total Minutes    Timed Charges Total Minutes -- 30  -RB --    Untimed Charges Total Minutes 20  -RB -- --     Total Minutes 20  -RB 30  -RB --              User Key  (r) = Recorded By, (t) = Taken By, (c) = Cosigned By      Initials Name Provider Type    Scarlet Scott, PT Physical Therapist    RB Becky Raymond, OT Occupational Therapist                  Therapy Charges for Today       Code Description Service Date Service Provider Modifiers Qty    97644484622 HC OT NEUROMUSC RE EDUCATION EA 15 MIN 9/5/2023 Becky Raymond OT GO 1    68982982362 HC OT SELF CARE/MGMT/TRAIN EA 15 MIN 9/5/2023 Becky Raymond OT GO 1    00445206785 HC OT EVAL MOD COMPLEXITY 3 9/5/2023 Becky Raymond OT GO 1                 Becky Raymond OT  9/5/2023

## 2023-09-05 NOTE — PLAN OF CARE
The pt was admitted to [facility] 2/2 to L hemiparesis (TNK). Pmhx significant for a prior stroke x5 years ago and HTN. The pt reports living with his wife and teenage children. He works full time and lives in a single level home. OT bed level eval requested today to address feeding. LUE strength 1/5, very weak. AAROM exercises completed. Difficulty with self feeding and grooming tasks present - able to compensate using his RUE with increased time. Assist provided with rolling in prep for a ECHO - Mod A due to LLE weakness. Acute rehab recommended.

## 2023-09-05 NOTE — ED PROVIDER NOTES
Pt presents to the ED c/o cute onset of left arm weakness that started 30 minutes prior to arrival as well as a cramping sensation to his left neck, arm and chest area.  Describes some loss of sensation in his left face and arm as well as the weakness.  Reported some mild double vision.  Patient reports a prior stroke about 4 years ago at [facility], states that they found a stroke on MRI and he went through physical therapy with improvement in symptoms.  States that symptoms at that time were worse than they are currently.     On exam,   General: No acute distress, nontoxic  HEENT: Mucous membranes moist, atraumatic, EOMI  Neck: Full ROM  Pulm: Symmetric chest rise, nonlabored, lungs CTAB  Cardiovascular: Regular rate and rhythm, intact distal pulses  GI: Soft, nontender, nondistended, no rebound, no guarding, bowel sounds present  MSK: Full ROM, no deformity  Skin: Warm, dry  Neuro: Awake, alert, oriented x 4, GCS 15, slight left-sided facial droop, definitive weakness in the left upper extremity with minimal movement and no resistance to gravity, slight weakness noted in the left lower extremity with some drift.  Decreased sensation left face and arm.  No significant dysarthria or aphasia.  Moving all extremities, no focal deficits  Psych: Calm, cooperative      Critical Care  Performed by: Rancho Sage MD  Authorized by: Rancho Sage MD     Critical care provider statement:     Critical care time (minutes):  34    Critical care time was exclusive of:  Separately billable procedures and treating other patients    Critical care was necessary to treat or prevent imminent or life-threatening deterioration of the following conditions:  CNS failure or compromise    Critical care was time spent personally by me on the following activities:  Development of treatment plan with patient or surrogate, discussions with consultants, evaluation of patient's response to treatment, examination of patient,  obtaining history from patient or surrogate, ordering and performing treatments and interventions, ordering and review of laboratory studies, ordering and review of radiographic studies, pulse oximetry, re-evaluation of patient's condition and review of old charts    Care discussed with comment:  Dr. Ashford, Stroke Neurology      Plan:   ED Course as of 09/05/23 0014   Mon Sep 04, 2023   2100 Patient history, ER presentation and evaluation discussed with stroke neurology, recommended we proceed with CT imaging and perfusion. [ART]   2130 EKG          EKG time: 2127  Rhythm/Rate: Sinus tachycardia at 103 bpm  P waves and NH: Borderline prolonged NH interval  QRS, axis: Prolonged QT interval  ST and T waves: No acute ST/T wave changes    Interpreted Contemporaneously by me, independently viewed  No prior EKG for comparison.   [ART]   2152 Discussed with Dr. Ashford, Stroke Neurology, discussed patient's clinical course and find today, treatment modalities, he recommends TNK given CT scans without overt findings and patient's symptoms.  [DC]   2156 WBC: 10.26 [ART]   2156 Hemoglobin: 16.3 [ART]   2156 Hematocrit: 48.8 [ART]   2156 Platelets: 300 [ART]   2200 Conversation with Dr. Sage and the patient through the  discussing all the risks and benefits of TNK including increased risk of bleeding and possible death, patient expresses understanding and would like to proceed with TNK. [ART]   2240 Glucose(!): 241 [ART]   2240 BUN: 8 [ART]   2240 Sodium: 142 [ART]   2240 Potassium(!): 3.2 [ART]   2240 Chloride: 105 [ART]   2240 CO2(!): 18.6 [ART]   2240 HS Troponin T: 9 [ART]   2248 Patient history, ER presentation and evaluation as well as treatment plan with TNK discussed with pulmonology, Dr. Palma, agrees to admit to the ICU. [ART]   2252 Patient's left arm weakness and left-sided facial droop have improved slightly since TNK administration. [ART]   2310 Patient now stating that he has decreased sensation in the right  leg and decreased strength. [ART]      ED Course User Index  [DC] Rancho Sage MD  [ART] Jose Guadalupe Garcias, PA     After discussions with stroke neurology and the patient, we opted to give TNK given a full discussion of the potential risks with the patient with a , he would prefer to move forward with TNK knowing these potential risks.  Discussed that while we think it could be stroke which obviously cannot rule in or out 1 where the other but given his symptoms that TNK would be a recommended treatment option.  He was given TNK.  Subsequently started having some right lower extremity weakness and numbness, was sent back for head CT which did not show any overt intracranial hemorrhage.  Was sent to the ICU for further monitoring.     Attestation:  The PAMELA and I have discussed this patient's history, physical exam, and treatment plan.  I have reviewed the documentation and personally had a face to face interaction with the patient. I affirm the documentation and agree with the treatment and plan.  The attached note describes my personal findings.            Rancho Sage MD  09/05/23 0018

## 2023-09-05 NOTE — NURSING NOTE
MRI tech Mike notified of slight left facial droop.  Discussed at time of change noted with Dr. Ashford.  Instructions to wait for MRI noted from Dr. Ashford.  Mike in MRI made aware of assessment change and need for MRI today, as soon as possible.  Mike to notify RN on time for study.

## 2023-09-05 NOTE — PLAN OF CARE
Goal Outcome Evaluation:      Pt came to ICU from ED at about 23:30 last night. TNK given in ED at 22:19. NIH scored 7 for LE weakness, RLE weakness, and some sensory deficit. [facility] Q15 and Q30 neuro checks complete; currently on Q1 hour checks. Pt is stable and left side seems to be mildly improving. No drooping or aphasia noted. VSS. AOx3. Pt primary language is [place]ive[place] but does speak and understand English. English stroke education book at bedside, waiting to see if we can get Dive[place] version. Wife and daughter at bedside when he arrived. Update given.

## 2023-09-05 NOTE — ED NOTES
Nursing report ED to floor  Heriberto Fields  58 y.o.  male    HPI :   Chief Complaint   Patient presents with    Extremity Weakness       Admitting doctor:   Adan Palma MD    Admitting diagnosis:   The primary encounter diagnosis was Left arm weakness. Diagnoses of Hyperglycemia, Hypokalemia, Facial droop, and History of stroke were also pertinent to this visit.    Code status:   Current Code Status       Date Active Code Status Order ID Comments User Context       9/4/2023 2252 CPR (Attempt to Resuscitate) [id number]  Adan Palma MD ED        Question Answer    Code Status (Patient has no pulse and is not breathing) CPR (Attempt to Resuscitate)    Medical Interventions (Patient has pulse or is breathing) Full Support                    Allergies:   Patient has no known allergies.    Isolation:   No active isolations    Intake and Output  No intake or output data in the 24 hours ending 09/04/23 2259    Weight:       09/04/23 2216   Weight: 77.5 kg (170 lb 13.7 oz)       Most recent vitals:   Vitals:    09/04/23 2227 09/04/23 2231 09/04/23 2235 09/04/23 2252   BP: 123/77 111/72     BP Location:       Patient Position:       Pulse: 101 96 96 97   Resp: 20 20  18   Temp:       TempSrc:       SpO2: 94% 94% 94%    Weight:       Height:           Active LDAs/IV Access:   Lines, Drains & Airways       Active LDAs       Name Placement date Placement time Site Days    Peripheral IV 09/04/23 2048 Right Antecubital 09/04/23 2048  Antecubital  less than 1                    Labs (abnormal labs have a star):   Labs Reviewed   COMPREHENSIVE METABOLIC PANEL - Abnormal; Notable for the following components:       Result Value    Glucose 241 (*)     Potassium 3.2 (*)     CO2 18.6 (*)     Anion Gap 18.4 (*)     All other components within normal limits    Narrative:     GFR Normal >60  Chronic Kidney Disease <60  Kidney Failure <15     PROTIME-INR - Abnormal; Notable for the following components:     INR 0.88 (*)     All other components within normal limits   CBC WITH AUTO DIFFERENTIAL - Abnormal; Notable for the following components:    Lymphocytes, Absolute 4.04 (*)     All other components within normal limits   POCT GLUCOSE FINGERSTICK - Abnormal; Notable for the following components:    Glucose 211 (*)     All other components within normal limits   APTT - Normal   SINGLE HSTROPONIN T - Normal    Narrative:     High Sensitive Troponin T Reference Range:  <10.0 ng/L- Negative Female for AMI  <15.0 ng/L- Negative Male for AMI  >=10 - Abnormal Female indicating possible myocardial injury.  >=15 - Abnormal Male indicating possible myocardial injury.   Clinicians would have to utilize clinical acumen, EKG, Troponin, and serial changes to determine if it is an Acute Myocardial Infarction or myocardial injury due to an underlying chronic condition.        RAINBOW DRAW    Narrative:     The following orders were created for panel order [place] Draw.  Procedure                               Abnormality         Status                     ---------                               -----------         ------                     Green Top (Gel)[[id number]]                                  Final result               Lavender Top[[id number]]                                     Final result               Gold Top - SST[[id number]]                                   Final result               Light Blue Top[[id number]]                                   Final result                 Please view results for these tests on the individual orders.   HEMOGLOBIN A1C   LIPID PANEL   POCT GLUCOSE FINGERSTICK   POCT GLUCOSE FINGERSTICK   POCT GLUCOSE FINGERSTICK   POCT GLUCOSE FINGERSTICK   POCT GLUCOSE FINGERSTICK   POCT GLUCOSE FINGERSTICK   POCT GLUCOSE FINGERSTICK   POCT GLUCOSE FINGERSTICK   POCT GLUCOSE FINGERSTICK   POCT GLUCOSE FINGERSTICK   POCT GLUCOSE FINGERSTICK   TYPE AND SCREEN   CBC AND DIFFERENTIAL    Narrative:     The  following orders were created for panel order CBC & Differential.  Procedure                               Abnormality         Status                     ---------                               -----------         ------                     CBC Auto Differential[[id number]]        Abnormal            Final result                 Please view results for these tests on the individual orders.   GREEN TOP   LAVENDER TOP   GOLD TOP - SST   LIGHT BLUE TOP       EKG:   ECG 12 Lead Stroke Evaluation   Preliminary Result   HEART RATE= 103  bpm   RR Interval= 583  ms   SC Interval= 204  ms   P Horizontal Axis= 17  deg   P Front Axis= -10  deg   QRSD Interval= 105  ms   QT Interval= 387  ms   QTcB= 507  ms   QRS Axis= 37  deg   T Wave Axis= 12  deg   - ABNORMAL ECG -   Sinus tachycardia   Borderline prolonged SC interval   Prolonged QT interval   Electronically Signed By:    Date and Time of Study: 2023-09-04 21:27:01          Meds given in ED:   Medications   sodium chloride 0.9 % flush 10 mL (has no administration in time range)   sodium chloride 0.9 % bolus 1,000 mL (1,000 mL Intravenous New Bag 9/4/23 2258)   sodium chloride 0.9 % flush 10 mL (has no administration in time range)   sodium chloride 0.9 % flush 10 mL (has no administration in time range)   sodium chloride 0.9 % infusion 40 mL (has no administration in time range)   acetaminophen (TYLENOL) tablet 650 mg (has no administration in time range)     Or   acetaminophen (TYLENOL) suppository 650 mg (has no administration in time range)   HYDROcodone-acetaminophen (NORCO) 5-325 MG per tablet 1 tablet (has no administration in time range)   fentaNYL citrate (PF) (SUBLIMAZE) injection 25 mcg (has no administration in time range)   aspirin tablet 325 mg (has no administration in time range)     Or   aspirin suppository 300 mg (has no administration in time range)   atorvastatin (LIPITOR) tablet 80 mg (has no administration in time range)   ondansetron (ZOFRAN) injection  4 mg (has no administration in time range)   bisacodyl (DULCOLAX) suppository 10 mg (has no administration in time range)   aluminum-magnesium hydroxide-simethicone (MAALOX MAX) 400-400-40 MG/5ML suspension 7.5 mL (has no administration in time range)   docusate sodium (COLACE) capsule 100 mg (has no administration in time range)   aluminum-magnesium hydroxide-simethicone (MAALOX MAX) 400-400-40 MG/5ML suspension 15 mL (has no administration in time range)   sennosides-docusate (PERICOLACE) 8.6-50 MG per tablet 2 tablet (has no administration in time range)     And   polyethylene glycol (MIRALAX) packet 17 g (has no administration in time range)     And   bisacodyl (DULCOLAX) EC tablet 5 mg (has no administration in time range)     And   bisacodyl (DULCOLAX) suppository 10 mg (has no administration in time range)   acetaminophen (TYLENOL) tablet 650 mg (has no administration in time range)     Or   acetaminophen (TYLENOL) suppository 650 mg (has no administration in time range)   ondansetron (ZOFRAN) tablet 4 mg (has no administration in time range)     Or   ondansetron (ZOFRAN) injection 4 mg (has no administration in time range)   iopamidol (ISOVUE-370) 76 % injection 150 mL (150 mL Intravenous Given 9/4/23 2117)   sodium chloride 0.9 % flush 10 mL (10 mL Intravenous Given 9/4/23 2219)     Followed by   tenecteplase for stroke (TNKASE) injection 19 mg (19 mg Intravenous Given 9/4/23 2219)     Followed by   sodium chloride 0.9 % flush 10 mL (10 mL Intravenous Given 9/4/23 2222)   potassium chloride (K-DUR,KLOR-CON) ER tablet 40 mEq (40 mEq Oral Given 9/4/23 2252)       Imaging results:  XR Chest 1 View    Result Date: 9/4/2023  Bibasilar atelectasis.  This report was finalized on 9/4/2023 10:29 PM by Dr. Colette Hurd M.D.       Ambulatory status:   - bedrest    Social issues:   Social History     Socioeconomic History    Marital status:    Tobacco Use    Smoking status: Never   Substance and Sexual  Activity    Alcohol use: Yes     Comment: socially    Drug use: Never       NIH Stroke Scale:  Interval: baseline    Jada Aguilar RN  09/04/23 22:59 EDT

## 2023-09-05 NOTE — ED TRIAGE NOTES
Pt reports he was in the car for a long car ride today an has been having L arm weakness for 30minutes with a headache. Hx of stroke approx 5 years ago

## 2023-09-06 ENCOUNTER — APPOINTMENT (OUTPATIENT)
Dept: GENERAL RADIOLOGY | Facility: HOSPITAL | Age: 58
DRG: 103 | End: 2023-09-06
Payer: MEDICAID

## 2023-09-06 PROBLEM — E78.5 HLD (HYPERLIPIDEMIA): Status: ACTIVE | Noted: 2023-09-06

## 2023-09-06 LAB
GLUCOSE BLDC GLUCOMTR-MCNC: 94 MG/DL (ref 70–130)
GLUCOSE BLDC GLUCOMTR-MCNC: 97 MG/DL (ref 70–130)
QT INTERVAL: 387 MS
QTC INTERVAL: 507 MS

## 2023-09-06 PROCEDURE — 97110 THERAPEUTIC EXERCISES: CPT

## 2023-09-06 PROCEDURE — 99231 SBSQ HOSP IP/OBS SF/LOW 25: CPT | Performed by: STUDENT IN AN ORGANIZED HEALTH CARE EDUCATION/TRAINING PROGRAM

## 2023-09-06 PROCEDURE — 97163 PT EVAL HIGH COMPLEX 45 MIN: CPT

## 2023-09-06 PROCEDURE — 92611 MOTION FLUOROSCOPY/SWALLOW: CPT

## 2023-09-06 PROCEDURE — 74230 X-RAY XM SWLNG FUNCJ C+: CPT

## 2023-09-06 PROCEDURE — 82948 REAGENT STRIP/BLOOD GLUCOSE: CPT

## 2023-09-06 RX ORDER — TADALAFIL 20 MG/1
1 TABLET ORAL DAILY PRN
Status: ON HOLD | COMMUNITY
Start: 2023-03-09 | End: 2023-09-07

## 2023-09-06 RX ORDER — LEVOCETIRIZINE DIHYDROCHLORIDE 5 MG/1
5 TABLET, FILM COATED ORAL DAILY
Qty: 30 TABLET | Refills: 5 | COMMUNITY
Start: 2023-07-10 | End: 2024-01-06

## 2023-09-06 RX ORDER — LOSARTAN POTASSIUM 25 MG/1
25 TABLET ORAL DAILY
Qty: 30 TABLET | Refills: 5 | COMMUNITY
Start: 2023-07-10 | End: 2024-01-06

## 2023-09-06 RX ORDER — ATORVASTATIN CALCIUM 80 MG/1
80 TABLET, FILM COATED ORAL NIGHTLY
COMMUNITY
Start: 2023-07-10

## 2023-09-06 RX ORDER — ERGOCALCIFEROL 1.25 MG/1
50000 CAPSULE ORAL
Qty: 4 CAPSULE | Refills: 5 | COMMUNITY
Start: 2023-07-10 | End: 2024-01-06

## 2023-09-06 RX ADMIN — ACETAMINOPHEN 650 MG: 325 TABLET, FILM COATED ORAL at 21:45

## 2023-09-06 RX ADMIN — ATORVASTATIN CALCIUM 80 MG: 80 TABLET, FILM COATED ORAL at 21:05

## 2023-09-06 RX ADMIN — BARIUM SULFATE 65 ML: 960 POWDER, FOR SUSPENSION ORAL at 14:14

## 2023-09-06 RX ADMIN — DOCUSATE SODIUM 100 MG: 100 CAPSULE, LIQUID FILLED ORAL at 09:20

## 2023-09-06 RX ADMIN — ACETAMINOPHEN 650 MG: 325 TABLET, FILM COATED ORAL at 09:20

## 2023-09-06 RX ADMIN — BARIUM SULFATE 1 TEASPOON(S): 0.6 CREAM ORAL at 14:14

## 2023-09-06 RX ADMIN — SENNOSIDES AND DOCUSATE SODIUM 2 TABLET: 50; 8.6 TABLET ORAL at 09:20

## 2023-09-06 RX ADMIN — ACETAMINOPHEN 650 MG: 325 TABLET, FILM COATED ORAL at 17:34

## 2023-09-06 RX ADMIN — BARIUM SULFATE 50 ML: 400 SUSPENSION ORAL at 14:14

## 2023-09-06 RX ADMIN — Medication 10 ML: at 21:09

## 2023-09-06 NOTE — PLAN OF CARE
Goal Outcome Evaluation:  Plan of Care Reviewed With: patient           Outcome Evaluation: Pt adm with R CVA with L hemiparesis, presents with impaired sensation, decreased strength L side, pt has good balance, no neglect noted, significant impairment with functional mobility, he will benefit from PT to address. Pt is independent at baseline, very active, lives with his spouse and teenage children. Pt pleasant and cooperative, worked on sitting, balance, standing, weight shifting in standing, and stepping, pt is a good acute rehab candidate.      Anticipated Discharge Disposition (PT): inpatient rehabilitation facility

## 2023-09-06 NOTE — PLAN OF CARE
Goal Outcome Evaluation:                      Oriented.  Left facial droop (slight) noted.  SLP involved.  Neurology aware.  See radiology results for MRI.  Plans for additional scans as soon as MRI available.

## 2023-09-06 NOTE — PROGRESS NOTES
"DOS: 2023  NAME: Heriberto Fields   : 1965  PCP: Courtney Croft APRN  Chief Complaint   Patient presents with    Extremity Weakness       Chief complaint: Left-sided weakness    Subjective: Patient has been seen and evaluated today, no acute events overnight.  Patient still complains of left arm weakness but is antigravity.    Objective:  Vital signs: /86   Pulse 67   Temp 97.7 °F (36.5 °C) (Oral)   Resp 14   Ht 160 cm (63\")   Wt 80.5 kg (177 lb 7.5 oz)   SpO2 93%   BMI 31.44 kg/m²    Gen: NAD, vitals reviewed  MS: oriented x3, recent/remote memory intact, normal attention/concentration, language intact, no neglect.  CN: visual acuity grossly normal, PERRL, EOMI, no facial droop, no dysarthria  Motor: Left upper extremity 2+/5 left lower extremity 5/5  Sensory: intact to light touch all 4 ext.    Laboratory results:  Lab Results   Component Value Date    GLUCOSE 131 (H) 2023    CALCIUM 8.1 (L) 2023     2023    K 4.4 2023    CO2 21.9 (L) 2023     2023    BUN 5 (L) 2023    CREATININE 0.89 2023    BCR 5.6 (L) 2023    ANIONGAP 13.1 2023     Lab Results   Component Value Date    WBC 5.89 2023    HGB 13.7 2023    HCT 41.0 2023    MCV 94.0 2023     2023     Lab Results   Component Value Date     (H) 2023         Lab 23  0344   HEMOGLOBIN A1C 5.60        Review of labs:   Glucose 97    A1c 5.6      Hemoglobin 13.7 and platelets 221 from yesterday    Review and interpretation of imaging:   CT head no acute hypodensity or hyperdensity  CTA head and neck no large vessel occlusions or stenosis     CTP no core or penumbra    MRI brain did not show any acute stroke in DWI, but did show T2 flair hyperintensity most of them cortical, near the juxtacortical region and also some periventricular white matter changes.  Considering on the hyperlipidemia as risk " factor, differentials include small vessel disease versus demyelinating disease.    Diagnoses:  Left-sided weakness    Clinical stroke versus demyelinating disease versus cervical radiculopathy     Comment: Patient is a 58-year-old with history of previous stroke 5 years ago no records available with no residual deficits and high cholesterol.  Patient came in with acute onset left-sided weakness to the ER on 9//23 was within the window for TNK and received TNK after CT head did not show any acute hypodensity or hyperdensity.  Has CTA head and neck and CTP were negative.  His MRI brain showed no acute stroke but showed several T2 flair changes concerning for small vessel disease versus demyelinating disease.     Hemiplegic migraine and also functional neurological disorder is on differential    Plan:  -   Okay to be downgraded to floor  1.  Continue aspirin 81 mg and Lipitor 80 mg daily  2.  We will follow-up on MRI brain with contrast and also MRI C-spine based on the imaging will do further investigation  3.  Continue PT OT and speech eval    Management discussed with patient and the family

## 2023-09-06 NOTE — MBS/VFSS/FEES
Acute Care - Speech Language Pathology   Swallow Initial Evaluation [facility]     Patient Name: Heriberto Fields  : 1965  MRN: [id number]  Today's Date: 2023               Admit Date: 2023    Visit Dx:     ICD-10-CM ICD-9-CM   1. Left arm weakness  R29.898 729.89   2. Hyperglycemia  R73.9 790.29   3. Hypokalemia  E87.6 276.8   4. Facial droop  R29.810 781.94   5. History of stroke  Z86.73 V12.54     Patient Active Problem List   Diagnosis    Stroke    Left arm weakness    HLD (hyperlipidemia)     Past Medical History:   Diagnosis Date    GERD (gastroesophageal reflux disease)     Stroke      History reviewed. No pertinent surgical history.    SLP Recommendation and Plan  SLP Swallowing Diagnosis: swallow WFL/no suspected pharyngeal impairment (23)  SLP Diet Recommendation: regular textures, thin liquids (23)  Recommended Precautions and Strategies: upright posture during/after eating, small bites of food and sips of liquid (23)  SLP Rec. for Method of Medication Administration: meds whole, as tolerated (23)     Monitor for Signs of Aspiration: yes, notify SLP if any concerns (23)  Recommended Diagnostics: No further SLP services recommended (23)  Swallow Criteria for Skilled Therapeutic Interventions Met: demonstrates skilled criteria (23)  Anticipated Discharge Disposition (SLP): unknown (23)  Rehab Potential/Prognosis, Swallowing: good, to achieve stated therapy goals (23)  Therapy Frequency (Swallow): PRN (23)  Predicted Duration Therapy Intervention (Days): until discharge (23)  Oral Care Recommendations: Oral Care BID/PRN (23)        Daily Summary of Progress (SLP): progress toward functional goals as expected (23)               Treatment Assessment (SLP): continued (23)     Plan for Continued Treatment (SLP): continue treatment  "per plan of care (09/06/23 1416)       Oral Care Recommendations: Oral Care BID/PRN (09/06/23 1416)    Plan of Care Reviewed With: patient  Outcome Evaluation: Mildly impaired, however functional oropharyngeal swallow. High trace transient penetration with thin liquids during the swallow. No aspiration visualized. No significant BOT or pharyngeal residue.      SWALLOW EVALUATION (last 72 hours)       SLP Adult Swallow Evaluation       Row Name 09/06/23 1416       Rehab Evaluation    Document Type --    Subjective Information no complaints  -HF    Patient Observations alert;cooperative  -HF    Patient Effort good  -HF    Symptoms Noted During/After Treatment none  -HF       General Information    Patient Profile Reviewed yes  -HF    Pertinent History Of Current Problem \"Patient is a 58-year-old male with history of hypertension, comes in with chief complaints of left-sided weakness which started 30 minutes prior to the arrival last known normal was 8:38 PM.  He was given TNK after CT head showed no acute hypodensity or hyperdensity, the head and neck did not show any acute vessel stenosis or cutoff CT perfusion did not show any core or penumbra\"  -HF    Current Method of Nutrition mechanical ground textures;nectar/syrup-thick liquids  -HF       Pain    Additional Documentation Pain Scale: FACES Pre/Post-Treatment (Group)  -HF       Pain Scale: FACES Pre/Post-Treatment    Pain: FACES Scale, Pretreatment 0-->no hurt  -HF       Oral Motor Structure and Function    Dentition Assessment natural, present and adequate  -HF       Oral Musculature and Cranial Nerve Assessment    Oral Motor General Assessment oral labial or buccal impairment  -HF       General Eating/Swallowing Observations    Respiratory Support Currently in Use room air  -HF    Eating/Swallowing Skills fed by SLP;self-fed;appropriate self-feeding skills observed  -HF    Positioning During Eating upright 90 degree  -HF       Respiratory    Respiratory Status " WFL;room air  -HF          MBS/VFSS Interpretation    VFSS Summary Pt seated 90 degrees upright in chair. He is able to self feed. Pt accepted p.o. trials of barium coated nectar thick liquid via spoon, thin liquid via straw, puree, soft solid/mixed consistency, and regular solid. Timely and functional mastication, no oral residue. BOT retraction WFL. Mildly prolonged a-p transit up to 2-3 seconds, premature spillage to the pyriforms with thin liquid, and to the vallecula with all other consistencies. Adequate anterior hyoid excursion, functional laryngeal elevation, and complete epiglottic inversion. Pharyngeal stripping wave and UES emptying WFL. High trace transient penetration with thin liquids during the swallow. No aspiration visualized. No significant BOT or pharyngeal residue. Recommend pt initiate regular diet with thin liquids. Meds whole as able. General aspiration precautions including 90 degrees upright and small bites/drinks. Pt verbalized understanding. Will f/u for diet tolerance as able.  -HF       SLP Communication to Radiology    Summary Statement Radiologist present, Dr. Hunter. High transient penetration during the swallow with thin liquids. No aspiration visualized.  -HF       SLP Evaluation Clinical Impression    SLP Swallowing Diagnosis swallow WFL/no suspected pharyngeal impairment  -HF    Functional Impact no impact on function  -HF    Rehab Potential/Prognosis, Swallowing good, to achieve stated therapy goals  -HF    Swallow Criteria for Skilled Therapeutic Interventions Met demonstrates skilled criteria  -HF       SLP Treatment Clinical Impressions    Treatment Assessment (SLP) continued  -HF    Daily Summary of Progress (SLP) progress toward functional goals as expected  -HF    Plan for Continued Treatment (SLP) continue treatment per plan of care  -HF    Care Plan Review evaluation/treatment results reviewed  -HF       Recommendations    Therapy Frequency (Swallow) PRN  -HF     Predicted Duration Therapy Intervention (Days) until discharge  -HF    SLP Diet Recommendation regular textures;thin liquids  -HF    Recommended Diagnostics No further SLP services recommended  -HF    Recommended Precautions and Strategies upright posture during/after eating;small bites of food and sips of liquid  -HF    Oral Care Recommendations Oral Care BID/PRN  -HF    SLP Rec. for Method of Medication Administration meds whole;as tolerated  -HF    Monitor for Signs of Aspiration yes;notify SLP if any concerns  -HF    Anticipated Discharge Disposition (SLP) unknown  -HF       Swallow Goals (SLP)    Swallow LTGs Patient will demonstrate functional swallow for  -HF    Swallow STGs diet tolerance goal selection (SLP)  -HF       (LTG) Patient will demonstrate functional swallow for    Diet Texture (Demonstrate functional swallow) regular textures  -HF    Liquid viscosity (Demonstrate functional swallow) thin liquids  -HF    [place] (Demonstrate functional swallow) independently (over 90% accuracy)  -    Time Frame (Demonstrate functional swallow) by discharge  -              User Key  (r) = Recorded By, (t) = Taken By, (c) = Cosigned By      Initials Name Effective Dates     Damaris Enciso, MS CCC-SLP 06/16/21 -     Lamar Jeronimo SLP 08/01/23 -                     EDUCATION  The patient has been educated in the following areas:   Dysphagia (Swallowing Impairment).        SLP GOALS       Row Name 09/06/23 1416 09/05/23 1100          (LTG) Patient will demonstrate functional swallow for    Diet Texture (Demonstrate functional swallow) regular textures  -HF regular textures  -     Liquid viscosity (Demonstrate functional swallow) thin liquids  -HF thin liquids  -     [place] (Demonstrate functional swallow) independently (over 90% accuracy)  -HF independently (over 90% accuracy)  -     Time Frame (Demonstrate functional swallow) by discharge  - --               User Key  (r) = Recorded By, (t)  = Taken By, (c) = Cosigned By      Initials Name Provider Type    Damaris Elliott MS CCC-SLP Speech and Language Pathologist    Lamar Jeronimo SLP Speech and Language Pathologist                       Time Calculation:    Time Calculation- SLP       Row Name 09/06/23 1430             Time Calculation- SLP    SLP Start Time 1300  -HF      SLP Received On 09/06/23  -HF         Untimed Charges    SLP Eval/Re-eval  [facility] Eval Swallow - 23649  -                User Key  (r) = Recorded By, (t) = Taken By, (c) = Cosigned By      Initials Name Provider Type     Lamar Daniels SLP Speech and Language Pathologist                    Therapy Charges for Today       Code Description Service Date Service Provider Modifiers Qty    [id number] HC ST MOTION FLUORO EVAL SWALLOW 4 9/6/2023 Lamar Daniels SLP GN 1                 CLAY Johnson  9/6/2023

## 2023-09-06 NOTE — PROGRESS NOTES
[facility] Acute Inpt Rehab     Received request for acute inpt rehab evaluation.      Chart reviewed.  Will follow, await PT evaluation.      Noted passed video swallow.      Thanks,   Guerita Mcclendon RN  Rehab Admission Nurse

## 2023-09-06 NOTE — PROGRESS NOTES
"      [facility]         Dr Ortiz Sayied   LOS: 2 days   Patient Care Team:  Courtney Croft APRN as PCP - General (Nurse Practitioner)    Chief Complaint: Left hemiparesis acute CVA status post TNK    Interval History: Resting comfortably.  No overnight issues reported    REVIEW OF SYSTEMS:   CARDIOVASCULAR: No chest pain, chest pressure or chest discomfort. No palpitations or edema.   RESPIRATORY: No shortness of breath, cough or sputum.   GASTROINTESTINAL: No anorexia, nausea, vomiting or diarrhea. No abdominal pain or blood.   HEMATOLOGIC: No bleeding or bruising.     Ventilator/Non-Invasive Ventilation Settings (From admission, onward)      None              Vital Signs  Temp:  [97.4 °F (36.3 °C)-98.1 °F (36.7 °C)] 97.7 °F (36.5 °C)  Heart Rate:  [58-91] 76  Resp:  [14-16] 14  BP: (114-173)/() 124/81    Intake/Output Summary (Last 24 hours) at 9/6/2023 1042  Last data filed at 9/6/2023 0900  Gross per 24 hour   Intake 594 ml   Output 700 ml   Net -106 ml       Flowsheet Rows      Flowsheet Row First Filed Value   Admission Height 175 cm (68.9\") Documented at 09/04/2023 2052   Admission Weight 90.7 kg (200 lb) Documented at 09/04/2023 2052            Physical Exam:  Patient is examined using the personal protective equipment as per guidelines from infection control for this particular patient as enacted.  Hand hygiene was performed before and after patient interaction.   General Appearance:    Alert, cooperative, in no acute distress.  Following simple commands  ENT Mallampati between 3 and 4 no nasal congestion  Neck midline trachea, no thyromegaly   Lungs:     Clear to auscultation, respirations regular, even and                  unlabored    Heart:    Regular rhythm and normal rate, normal S1 and S2, no            murmur, no gallop, no rub, no click   Chest Wall:    No abnormalities observed   Abdomen:     Normal bowel sounds, no masses, no organomegaly, soft        nontender, " nondistended, no guarding, no rebound                tenderness   Extremities:   Moves all extremities well, no edema, no cyanosis, no             redness  CNS left hemiparesis  Skin no rashes no nodules  Musculoskeletal no cyanosis no clubbing normal range of motion     Results Review:        Results from last 7 days   Lab Units 09/05/23 2223 09/05/23 0344 09/04/23 2102   SODIUM mmol/L  --  141 142   POTASSIUM mmol/L 4.4 3.4* 3.2*   CHLORIDE mmol/L  --  106 105   CO2 mmol/L  --  21.9* 18.6*   BUN mg/dL  --  5* 8   CREATININE mg/dL  --  0.89 1.14   GLUCOSE mg/dL  --  131* 241*   CALCIUM mg/dL  --  8.1* 9.0       Results from last 7 days   Lab Units 09/04/23  2102   HSTROP T ng/L 9       Results from last 7 days   Lab Units 09/05/23 0344 09/04/23  2102   WBC 10*3/mm3 5.89 10.26   HEMOGLOBIN g/dL 13.7 16.3   HEMATOCRIT % 41.0 48.8   PLATELETS 10*3/mm3 221 300       Results from last 7 days   Lab Units 09/04/23  2102   INR  0.88*   APTT seconds 24.3       Results from last 7 days   Lab Units 09/05/23  0344   CHOLESTEROL mg/dL 210*           Results from last 7 days   Lab Units 09/05/23  0344   CHOLESTEROL mg/dL 210*   TRIGLYCERIDES mg/dL 188*   HDL CHOL mg/dL 53   LDL CHOL mg/dL 124*             I reviewed the patient's new clinical results.  I personally viewed and interpreted the patient's chest x-ray.        Medication Review:   aspirin, 325 mg, Oral, Daily   Or  aspirin, 300 mg, Rectal, Daily  atorvastatin, 80 mg, Oral, Nightly  docusate sodium, 100 mg, Oral, Daily  senna-docusate sodium, 2 tablet, Oral, BID  sodium chloride, 10 mL, Intravenous, Q12H             ASSESSMENT:   Acute stroke: Status post TNK  Left-sided weakness secondary to stroke  Severe headache and hypertension  History of previous stroke  Hypokalemia    PLAN:  Neurochecks per protocol  MRI negative for acute infarct  Post TNK orders  MRI ordered per neurology  Blood pressure management treat blood pressure of greater than 180/105  Repeat CT  head stable  Replace potassium per protocol  Stroke work-up per neurology.  Transfer patient out of the ICU  Hospitalist consult for medical management      Diana Tirado MD  09/06/23  10:42 EDT

## 2023-09-06 NOTE — PROGRESS NOTES
Discharge Planning Assessment  [facility]     Patient Name: Heriberto Fields  MRN: [id number]  Today's Date: 9/6/2023    Admit Date: 9/4/2023    Plan: Referral for acute rehab at [facility]   Discharge Needs Assessment       Row Name 09/06/23 1523       Living Environment    People in Home child(diana), dependent;spouse    Name(s) of People in Home wife and teenaged children    Current Living Arrangements home    Potentially Unsafe Housing Conditions none    Primary Care Provided by self    Provides Primary Care For child(diana)    Family Caregiver if Needed spouse    Quality of Family Relationships supportive    Able to Return to Prior Arrangements yes       Resource/Environmental Concerns    Resource/Environmental Concerns none    Transportation Concerns none       Food Insecurity    Within the past 12 months, you worried that your food would run out before you got the money to buy more. Never true    Within the past 12 months, the food you bought just didn't last and you didn't have money to get more. Never true       Transition Planning    Patient/Family Anticipates Transition to home with family    Patient/Family Anticipated Services at Transition none    Transportation Anticipated family or friend will provide       Discharge Needs Assessment    Equipment Currently Used at Home none    Anticipated Changes Related to Illness none    Equipment Needed After Discharge none                   Discharge Plan       Row Name 09/06/23 1524       Plan    Plan Referral for acute rehab at [facility]    Plan Comments CCP spoke to patient at bedside. CCP role explained. Face sheet verified. Discharge planning discussed. Pt PCP is TOMER Spear.  He obtains his medications from [facility] pharmacy.  He lives in a one- story house with his wife and teenaged children.  He has a first- floor bed and bathroom.  He uses no DME to ambulate.  He is independent with ADL's. He has no prior history of HH. He has no rehab  history.  Pt does have a prior CVA history.  Referral to [facility] for acute rehab    CCP following                  Continued Care and Services - Admitted Since 9/4/2023    Coordination has not been started for this encounter.          Demographic Summary    No documentation.                  Functional Status    No documentation.                  Psychosocial    No documentation.                  Abuse/Neglect    No documentation.                  Legal    No documentation.                  Substance Abuse    No documentation.                  Patient Forms    No documentation.                     Ashley Perez RN

## 2023-09-06 NOTE — PLAN OF CARE
Problem: Adult Inpatient Plan of Care  Goal: Plan of Care Review  Flowsheets (Taken 9/6/2023 1263)  Plan of Care Reviewed With: patient  Outcome Evaluation: Mildly impaired, however functional oropharyngeal swallow. High trace transient penetration with thin liquids during the swallow. No aspiration visualized. No significant BOT or pharyngeal residue.   Goal Outcome Evaluation:  Plan of Care Reviewed With: patient           Outcome Evaluation: Mildly impaired, however functional oropharyngeal swallow. High trace transient penetration with thin liquids during the swallow. No aspiration visualized. No significant BOT or pharyngeal residue. Recommend pt initiate regular diet with thin liquids. Meds whole as able. General aspiration precautions including 90 degrees upright and small bites/drinks. Pt verbalized understanding. Will f/u for diet tolerance as able.

## 2023-09-06 NOTE — CONSULTS
Name: Heriberto Fields ADMIT: 2023   : 1965  PCP: Courtney Croft APRN    MRN: [id number] LOS: 2 days   AGE/SEX: 58 y.o. male  ROOM: [facility]     Subjective   Subjective   Resting comfortably in bed, he has no new complaints today.    Objective   Objective   Vital Signs  Temp:  [97.4 °F (36.3 °C)-98.2 °F (36.8 °C)] 98.2 °F (36.8 °C)  Heart Rate:  [53-91] 53  Resp:  [14-16] 14  BP: (114-173)/() 125/79  SpO2:  [93 %-98 %] 93 %  on   ;   Device (Oxygen Therapy): room air  Body mass index is 31.44 kg/m².  Physical Exam  Constitutional:       General: He is not in acute distress.  Cardiovascular:      Rate and Rhythm: Normal rate.      Pulses: Normal pulses.   Pulmonary:      Effort: Pulmonary effort is normal.   Abdominal:      General: Abdomen is flat.      Palpations: Abdomen is soft.   Neurological:      Mental Status: He is alert and oriented to person, place, and time.      Comments: LUE weakness       Results Review     I reviewed the patient's new clinical results.  Results from last 7 days   Lab Units 23  0344 23  2102   WBC 10*3/mm3 5.89 10.26   HEMOGLOBIN g/dL 13.7 16.3   PLATELETS 10*3/mm3 221 300     Results from last 7 days   Lab Units 23  2223 23  0344 23  2102   SODIUM mmol/L  --  141 142   POTASSIUM mmol/L 4.4 3.4* 3.2*   CHLORIDE mmol/L  --  106 105   CO2 mmol/L  --  21.9* 18.6*   BUN mg/dL  --  5* 8   CREATININE mg/dL  --  0.89 1.14   GLUCOSE mg/dL  --  131* 241*   EGFR mL/min/1.73  --  98.1 73.6     Results from last 7 days   Lab Units 23  2102   ALBUMIN g/dL 4.5   BILIRUBIN mg/dL 0.3   ALK PHOS U/L 73   AST (SGOT) U/L 25   ALT (SGPT) U/L 34     Results from last 7 days   Lab Units 234 23  2102   CALCIUM mg/dL 8.1* 9.0   ALBUMIN g/dL  --  4.5       Hemoglobin A1C   Date/Time Value Ref Range Status   2023 0344 5.60 4.80 - 5.60 % Final     Glucose   Date/Time Value Ref Range Status   2023 0437 97 70 - 130  mg/dL Final   09/06/2023 0034 94 70 - 130 mg/dL Final   09/05/2023 2000 92 70 - 130 mg/dL Final   09/05/2023 1814 107 70 - 130 mg/dL Final   09/05/2023 1229 95 70 - 130 mg/dL Final   09/04/2023 2349 197 (H) 70 - 130 mg/dL Final   09/04/2023 2045 211 (H) 70 - 130 mg/dL Final       CT Head Without Contrast    Result Date: 9/5/2023  No acute intracranial findings.  Radiation dose reduction techniques were utilized, including automated exposure control and exposure modulation based on body size.   This report was finalized on 9/5/2023 5:04 AM by Dr. Colette Hurd M.D.      CT Head Without Contrast    Result Date: 9/4/2023  No acute intracranial findings.  Radiation dose reduction techniques were utilized, including automated exposure control and exposure modulation based on body size.   This report was finalized on 9/4/2023 11:27 PM by Dr. Colette Hurd M.D.      CT Angiogram Neck    Result Date: 9/4/2023  IMPRESSION :  1. No acute intracranial findings. 2. No cervical vascular stenosis or occlusion. 3. No intracranial stenosis or occlusion. 4. No areas of cerebral blood flow less than 30% or Tmax greater than 6 seconds are seen.   This report was finalized on 9/4/2023 11:24 PM by Dr. Colette Hurd M.D.      MRI Brain Without Contrast    Result Date: 9/5/2023   No acute infarct. Mild nonspecific periventricular white matter changes.  This report was finalized on 9/5/2023 2:24 PM by Dr. Kirt Paul M.D.      XR Chest 1 View    Result Date: 9/4/2023  Bibasilar atelectasis.  This report was finalized on 9/4/2023 10:29 PM by Dr. Colette Hurd M.D.      CT Angiogram Head w AI Analysis of LVO    Result Date: 9/4/2023  IMPRESSION :  1. No acute intracranial findings. 2. No cervical vascular stenosis or occlusion. 3. No intracranial stenosis or occlusion. 4. No areas of cerebral blood flow less than 30% or Tmax greater than 6 seconds are seen.   This report was finalized on 9/4/2023 11:24 PM by Dr. Alvarenga  CHIDI Hurd      CT CEREBRAL PERFUSION WITH & WITHOUT CONTRAST    Result Date: 9/4/2023  IMPRESSION :  1. No acute intracranial findings. 2. No cervical vascular stenosis or occlusion. 3. No intracranial stenosis or occlusion. 4. No areas of cerebral blood flow less than 30% or Tmax greater than 6 seconds are seen.   This report was finalized on 9/4/2023 11:24 PM by Dr. Colette Hurd M.D.     Scheduled Medications  aspirin, 325 mg, Oral, Daily   Or  aspirin, 300 mg, Rectal, Daily  atorvastatin, 80 mg, Oral, Nightly  docusate sodium, 100 mg, Oral, Daily  senna-docusate sodium, 2 tablet, Oral, BID  sodium chloride, 10 mL, Intravenous, Q12H    Infusions   Diet  Diet: Regular/House Diet; No Mixed Consistencies; Texture: Mechanical Ground (NDD 2); Fluid Consistency: Nectar Thick       Assessment/Plan     Active Hospital Problems    Diagnosis  POA    **Stroke [I63.9]  Yes    HLD (hyperlipidemia) [E78.5]  Yes    Left arm weakness [R29.898]  Yes      Resolved Hospital Problems   No resolved problems to display.       58 y.o. male admitted with Stroke.      09/06/23  PT and OT eval's for placement assistance.  MRI brain and C-spine with contrast pending.    L sided weakness  History of stroke without residual deficit  -s/p TNK  -CTA H/N without large vessel occlusion or stenosis  -MRI negative for acute abnormality  -Stroke following  -ASA 81 mg, atorvastatin 80 mg  -MRI brain and C-spine with contrast pending to eval for demyelinating disorder  -PT/OT/speech    HLD  -chol 210,   -atorvastatin as above    Hypokalemia, replete    SCDs for DVT prophylaxis.  Discussed with patient.  Anticipate discharge Pending PT/OT eval. TBD      Gregorio Ibrahim MD  [facility]  09/06/23  14:10 EDT

## 2023-09-06 NOTE — THERAPY EVALUATION
Patient Name: Heriberto Fields  : 1965    MRN: [id number]                              Today's Date: 2023       Admit Date: 2023    Visit Dx:     ICD-10-CM ICD-9-CM   1. Left arm weakness  R29.898 729.89   2. Hyperglycemia  R73.9 790.29   3. Hypokalemia  E87.6 276.8   4. Facial droop  R29.810 781.94   5. History of stroke  Z86.73 V12.54     Patient Active Problem List   Diagnosis    Stroke    Left arm weakness    HLD (hyperlipidemia)     Past Medical History:   Diagnosis Date    GERD (gastroesophageal reflux disease)     Stroke      History reviewed. No pertinent surgical history.   General Information       Row Name 23 1603          Physical Therapy Time and Intention    Document Type evaluation  -PC     Mode of Treatment physical therapy  -PC       Row Name 23 1603          General Information    Patient Profile Reviewed yes  -PC     Existing Precautions/Restrictions fall  -PC     Barriers to Rehab medically complex  -PC       Row Name 23 1603          Living Environment    People in Home child(diana), adult;child(diana), dependent  -PC       Row Name 23 1603          Home Main Entrance    Number of Stairs, Main Entrance three  -PC       Row Name 23 1603          Stairs Within Home, Primary    Number of Stairs, Within Home, Primary none  -PC       Row Name 23 1603          Cognition    Orientation Status (Cognition) oriented x 3  -PC       Row Name 23 1603          Safety Issues, Functional Mobility    Impairments Affecting Function (Mobility) balance;coordination;endurance/activity tolerance;strength;sensation/sensory awareness;grasp;motor control;motor planning  -PC               User Key  (r) = Recorded By, (t) = Taken By, (c) = Cosigned By      Initials Name Provider Type    PC Lisa Leung PT Physical Therapist                   Mobility       Row Name 23 1604          Bed Mobility    Bed Mobility supine-sit;sit-supine  -PC      Supine-Sit [place] (Bed Mobility) minimum assist (75% patient effort)  -PC     Sit-Supine [place] (Bed Mobility) minimum assist (75% patient effort)  -       Row Name 09/06/23 1604          Sit-Stand Transfer    Sit-Stand [place] (Transfers) minimum assist (75% patient effort);moderate assist (50% patient effort);2 person assist  -     Assistive Device (Sit-Stand Transfers) --  HHA X 2, L knee blocked  -       Row Name 09/06/23 1604          Gait/Stairs (Locomotion)    Distance in Feet (Gait) worked on weight shifting in standing, then forward/backward stepping with LLE, attempted forward step with RLE with L knee blocked, L knee buckling completely, pt was able to take 2-3 sidesteps to left with L knee blocked  -PC               User Key  (r) = Recorded By, (t) = Taken By, (c) = Cosigned By      Initials Name Provider Type    Lisa Garcias, PT Physical Therapist                   Obj/Interventions       Row Name 09/06/23 1607          Range of Motion Comprehensive    Comment, General Range of Motion L LE AAROM WNL  -PC       Row Name 09/06/23 1607          Strength Comprehensive (MMT)    Comment, General Manual Muscle Testing (MMT) Assessment L LE: 2/5  -PC       Row Name 09/06/23 1607          Motor Skills    Motor Skills --  low tone, impaired coordination L UE and LLE  -PC       Row Name 09/06/23 1607          Balance    Balance Assessment sitting static balance;sitting dynamic balance;standing static balance;standing dynamic balance  -     Static Sitting Balance standby assist  -PC     Dynamic Sitting Balance contact guard  -PC     Position, Sitting Balance sitting edge of bed  -     Static Standing Balance minimal assist  -PC     Dynamic Standing Balance moderate assist  -PC       Row Name 09/06/23 1607          Sensory Assessment (Somatosensory)    Sensory Assessment (Somatosensory) left UE;left LE  -PC     Left UE Sensory Assessment general sensation;impaired;light touch  localization;proprioception;sharp-dull discrimination  -PC     Left LE Sensory Assessment general sensation;impaired;light touch localization;proprioception;sharp-dull discrimination  -PC               User Key  (r) = Recorded By, (t) = Taken By, (c) = Cosigned By      Initials Name Provider Type    PC Lisa Leung, PT Physical Therapist                   Goals/Plan       Row Name 09/06/23 1615          Bed Mobility Goal 1 (PT)    Activity/Assistive Device (Bed Mobility Goal 1, PT) sit to supine/supine to sit  -PC     [place] Level/Cues Needed (Bed Mobility Goal 1, PT) contact guard required  -PC     Time Frame (Bed Mobility Goal 1, PT) 1 week  -PC       Row Name 09/06/23 1615          Transfer Goal 1 (PT)    Activity/Assistive Device (Transfer Goal 1, PT) sit-to-stand/stand-to-sit  -PC     [place] Level/Cues Needed (Transfer Goal 1, PT) contact guard required  -PC     Time Frame (Transfer Goal 1, PT) 1 week  -PC       Row Name 09/06/23 1615          Gait Training Goal 1 (PT)    Activity/Assistive Device (Gait Training Goal 1, PT) gait (walking locomotion);assistive device use  -PC     [place] Level (Gait Training Goal 1, PT) moderate assist (50-74% patient effort);minimum assist (75% or more patient effort)  -PC     Distance (Gait Training Goal 1, PT) 20 ft  -PC     Time Frame (Gait Training Goal 1, PT) 1 week  -PC       Row Name 09/06/23 1615          Therapy Assessment/Plan (PT)    Planned Therapy Interventions (PT) balance training;bed mobility training;gait training;transfer training;strengthening;motor coordination training;neuromuscular re-education  -PC               User Key  (r) = Recorded By, (t) = Taken By, (c) = Cosigned By      Initials Name Provider Type    PC Lisa Leung, PT Physical Therapist                   Clinical Impression       Row Name 09/06/23 1610          Pain    Pretreatment Pain Rating 0/10 - no pain  -PC       Row Name 09/06/23 1610          Plan of Care Review     Plan of Care Reviewed With patient  -PC     Outcome Evaluation Pt adm with R CVA with L hemiparesis, presents with impaired sensation, decreased strength L side, pt has good balance, no neglect noted, significant impairment with functional mobility, he will benefit from PT to address. Pt is independent at baseline, very active, lives with his spouse and teenage children. Pt pleasant and cooperative, worked on sitting, balance, standing, weight shifting in standing, and stepping, pt is a good acute rehab candidate.  -PC       Row Name 09/06/23 1610          Therapy Assessment/Plan (PT)    Rehab Potential (PT) good, to achieve stated therapy goals  -PC     Criteria for Skilled Interventions Met (PT) yes;meets criteria  -PC     Therapy Frequency (PT) 6 times/wk  -PC       Row Name 09/06/23 1610          Positioning and Restraints    Pre-Treatment Position in bed  -PC     Post Treatment Position bed  -PC     In Bed supine;call light within reach;encouraged to call for assist;exit alarm on  -PC               User Key  (r) = Recorded By, (t) = Taken By, (c) = Cosigned By      Initials Name Provider Type    PC Lisa Leung, PT Physical Therapist                   Outcome Measures       Row Name 09/06/23 1616 09/06/23 1200       How much help from another person do you currently need...    Turning from your back to your side while in flat bed without using bedrails? 3  -PC 3  -NM    Moving from lying on back to sitting on the side of a flat bed without bedrails? 3  -PC 2  -NM    Moving to and from a bed to a chair (including a wheelchair)? 2  -PC 2  -NM    Standing up from a chair using your arms (e.g., wheelchair, bedside chair)? 2  -PC 2  -NM    Climbing 3-5 steps with a railing? 1  -PC 2  -NM    To walk in hospital room? 2  -PC 2  -NM    AM-PAC 6 Clicks Score (PT) 13  -PC 13  -NM    Highest level of mobility 4 --> Transferred to chair/commode  -PC 4 --> Transferred to chair/commode  -NM      Row Name 09/06/23 1616           Modified [place] Scale    Modified [place] Scale 4 - Moderately severe disability.  Unable to walk without assistance, and unable to attend to own bodily needs without assistance.  -PC       Row Name 09/06/23 1616          Functional Assessment    Outcome Measure Options AM-PAC 6 Clicks Basic Mobility (PT)  -PC               User Key  (r) = Recorded By, (t) = Taken By, (c) = Cosigned By      Initials Name Provider Type    PC Lisa Leung, PT Physical Therapist    Wilver Gordon RN Registered Nurse                                 Physical Therapy Education       Title: PT OT SLP Therapies (In Progress)       Topic: Physical Therapy (Done)       Point: Mobility training (Done)       Learning Progress Summary             Patient Acceptance, E,D, DU by PC at 9/6/2023 1617                         Point: Home exercise program (Done)       Learning Progress Summary             Patient Acceptance, E,D, DU by PC at 9/6/2023 1617                         Point: Body mechanics (Done)       Learning Progress Summary             Patient Acceptance, E,D, DU by PC at 9/6/2023 1617                         Point: Precautions (Done)       Learning Progress Summary             Patient Acceptance, E,D, DU by PC at 9/6/2023 1617                                         User Key       Initials Effective Dates Name Provider Type Discipline     06/16/21 -  Lsia Leung, PT Physical Therapist PT                  PT Recommendation and Plan  Planned Therapy Interventions (PT): balance training, bed mobility training, gait training, transfer training, strengthening, motor coordination training, neuromuscular re-education  Plan of Care Reviewed With: patient  Outcome Evaluation: Pt adm with R CVA with L hemiparesis, presents with impaired sensation, decreased strength L side, pt has good balance, no neglect noted, significant impairment with functional mobility, he will benefit from PT to address. Pt is independent  at baseline, very active, lives with his spouse and teenage children. Pt pleasant and cooperative, worked on sitting, balance, standing, weight shifting in standing, and stepping, pt is a good acute rehab candidate.     Time Calculation:         PT Charges       Row Name 09/06/23 1618             Time Calculation    Start Time 1517  -PC      Stop Time 1540  -PC      Time Calculation (min) 23 min  -PC      PT Received On 09/06/23  -PC      PT - Next Appointment 09/07/23  -PC      PT Goal Re-Cert Due Date 09/13/23  -PC                User Key  (r) = Recorded By, (t) = Taken By, (c) = Cosigned By      Initials Name Provider Type    PC Lisa Leung PT Physical Therapist                  Therapy Charges for Today       Code Description Service Date Service Provider Modifiers Qty    12009026171 HC PT EVAL HIGH COMPLEXITY 3 9/6/2023 Lisa Leung, PT GP 1    16006609924 HC PT THER PROC EA 15 MIN 9/6/2023 Lisa Leung, PT GP 1            PT G-Codes  Outcome Measure Options: AM-PAC 6 Clicks Basic Mobility (PT)  AM-PAC 6 Clicks Score (PT): 13  AM-PAC 6 Clicks Score (OT): 8  Modified [place] Scale: 4 - Moderately severe disability.  Unable to walk without assistance, and unable to attend to own bodily needs without assistance.  PT Discharge Summary  Anticipated Discharge Disposition (PT): inpatient rehabilitation facility    Lisa Leung PT  9/6/2023

## 2023-09-06 NOTE — DISCHARGE PLACEMENT REQUEST
"Heriberto Fields (58 y.o. Male)       Date of Birth   1965    Social Security Number       Address   [address] [place] [address]    Home Phone   [phone]    MRN   [id number]       Alevism   None    Marital Status                               Admission Date   9/4/23    Admission Type   Emergency    Admitting Provider   Adan Palma MD    Attending Provider   Adan Palma MD    Department, Room/Bed   [facility] INTENSIVE CARE, I384/1       Discharge Date       Discharge Disposition       Discharge Destination                                 Attending Provider: Adan Palma MD    Allergies: No Known Allergies    Isolation: None   Infection: None   Code Status: CPR    Ht: 160 cm (63\")   Wt: 80.5 kg (177 lb 7.5 oz)    Admission Cmt: None   Principal Problem: Stroke [I63.9]                   Active Insurance as of 9/4/2023       Primary Coverage       Payor Plan Insurance Group Employer/Plan Group    [facility] COMMUNITY PLAN [facility] COMMUNITY PLAN [facility]       Payor Plan Address Payor Plan Phone Number Payor Plan Fax Number Effective Dates    [address]   1/1/2023 - None Entered    [facility] [id number]         Subscriber Name Subscriber Birth Date Member ID       HERIBERTO FIELDS 1965 [id number]                     Emergency Contacts        (Rel.) Home Phone Work Phone Mobile Phone    Lona Fernandez (Spouse) -- -- [phone]                "

## 2023-09-07 ENCOUNTER — APPOINTMENT (OUTPATIENT)
Dept: GENERAL RADIOLOGY | Facility: HOSPITAL | Age: 58
DRG: 103 | End: 2023-09-07
Payer: MEDICAID

## 2023-09-07 ENCOUNTER — APPOINTMENT (OUTPATIENT)
Dept: MRI IMAGING | Facility: HOSPITAL | Age: 58
DRG: 103 | End: 2023-09-07
Payer: MEDICAID

## 2023-09-07 LAB
APPEARANCE CSF: CLEAR
APPEARANCE CSF: CLEAR
COLOR CSF: COLORLESS
COLOR CSF: COLORLESS
GLUCOSE CSF-MCNC: 61 MG/DL (ref 40–70)
HOLD SPECIMEN: NORMAL
METHOD: ABNORMAL
METHOD: NORMAL
NUC CELL # CSF MANUAL: 0 /MM3 (ref 0–5)
NUC CELL # CSF MANUAL: 2 /MM3 (ref 0–5)
PROT CSF-MCNC: 47.1 MG/DL (ref 15–45)
RBC # CSF MANUAL: 0 /MM3 (ref 0–0)
RBC # CSF MANUAL: 1 /MM3 (ref 0–0)
SPECIMEN VOL CSF: 3 ML
SPECIMEN VOL CSF: 3 ML
TUBE # CSF: 1
TUBE # CSF: 4

## 2023-09-07 PROCEDURE — 86362 MOG-IGG1 ANTB CBA EACH: CPT

## 2023-09-07 PROCEDURE — 82040 ASSAY OF SERUM ALBUMIN: CPT | Performed by: STUDENT IN AN ORGANIZED HEALTH CARE EDUCATION/TRAINING PROGRAM

## 2023-09-07 PROCEDURE — 0 LIDOCAINE 1 % SOLUTION: Performed by: RADIOLOGY

## 2023-09-07 PROCEDURE — 25010000002 DIPHENHYDRAMINE PER 50 MG

## 2023-09-07 PROCEDURE — 89050 BODY FLUID CELL COUNT: CPT | Performed by: STUDENT IN AN ORGANIZED HEALTH CARE EDUCATION/TRAINING PROGRAM

## 2023-09-07 PROCEDURE — 87070 CULTURE OTHR SPECIMN AEROBIC: CPT | Performed by: STUDENT IN AN ORGANIZED HEALTH CARE EDUCATION/TRAINING PROGRAM

## 2023-09-07 PROCEDURE — 86617 LYME DISEASE ANTIBODY: CPT | Performed by: STUDENT IN AN ORGANIZED HEALTH CARE EDUCATION/TRAINING PROGRAM

## 2023-09-07 PROCEDURE — 84157 ASSAY OF PROTEIN OTHER: CPT | Performed by: STUDENT IN AN ORGANIZED HEALTH CARE EDUCATION/TRAINING PROGRAM

## 2023-09-07 PROCEDURE — 88108 CYTOPATH CONCENTRATE TECH: CPT | Performed by: STUDENT IN AN ORGANIZED HEALTH CARE EDUCATION/TRAINING PROGRAM

## 2023-09-07 PROCEDURE — 82042 OTHER SOURCE ALBUMIN QUAN EA: CPT | Performed by: STUDENT IN AN ORGANIZED HEALTH CARE EDUCATION/TRAINING PROGRAM

## 2023-09-07 PROCEDURE — 82784 ASSAY IGA/IGD/IGG/IGM EACH: CPT | Performed by: STUDENT IN AN ORGANIZED HEALTH CARE EDUCATION/TRAINING PROGRAM

## 2023-09-07 PROCEDURE — 0 GADOBENATE DIMEGLUMINE 529 MG/ML SOLUTION: Performed by: INTERNAL MEDICINE

## 2023-09-07 PROCEDURE — 25010000002 PROCHLORPERAZINE 10 MG/2ML SOLUTION

## 2023-09-07 PROCEDURE — 83916 OLIGOCLONAL BANDS: CPT | Performed by: STUDENT IN AN ORGANIZED HEALTH CARE EDUCATION/TRAINING PROGRAM

## 2023-09-07 PROCEDURE — 83873 ASSAY OF CSF PROTEIN: CPT | Performed by: STUDENT IN AN ORGANIZED HEALTH CARE EDUCATION/TRAINING PROGRAM

## 2023-09-07 PROCEDURE — 86052 AQUAPORIN-4 ANTB CBA EACH: CPT | Performed by: STUDENT IN AN ORGANIZED HEALTH CARE EDUCATION/TRAINING PROGRAM

## 2023-09-07 PROCEDURE — 97110 THERAPEUTIC EXERCISES: CPT

## 2023-09-07 PROCEDURE — 82945 GLUCOSE OTHER FLUID: CPT | Performed by: STUDENT IN AN ORGANIZED HEALTH CARE EDUCATION/TRAINING PROGRAM

## 2023-09-07 PROCEDURE — 99232 SBSQ HOSP IP/OBS MODERATE 35: CPT

## 2023-09-07 PROCEDURE — 25010000002 MAGNESIUM SULFATE IN D5W 1G/100ML (PREMIX) 1-5 GM/100ML-% SOLUTION

## 2023-09-07 PROCEDURE — A9577 INJ MULTIHANCE: HCPCS | Performed by: INTERNAL MEDICINE

## 2023-09-07 PROCEDURE — 87015 SPECIMEN INFECT AGNT CONCNTJ: CPT | Performed by: STUDENT IN AN ORGANIZED HEALTH CARE EDUCATION/TRAINING PROGRAM

## 2023-09-07 PROCEDURE — 72156 MRI NECK SPINE W/O & W/DYE: CPT

## 2023-09-07 PROCEDURE — 87205 SMEAR GRAM STAIN: CPT | Performed by: STUDENT IN AN ORGANIZED HEALTH CARE EDUCATION/TRAINING PROGRAM

## 2023-09-07 PROCEDURE — 70553 MRI BRAIN STEM W/O & W/DYE: CPT

## 2023-09-07 RX ORDER — PROCHLORPERAZINE EDISYLATE 5 MG/ML
10 INJECTION INTRAMUSCULAR; INTRAVENOUS ONCE
Status: COMPLETED | OUTPATIENT
Start: 2023-09-07 | End: 2023-09-07

## 2023-09-07 RX ORDER — METHOCARBAMOL 500 MG/1
1000 TABLET, FILM COATED ORAL 4 TIMES DAILY
Status: DISCONTINUED | OUTPATIENT
Start: 2023-09-07 | End: 2023-09-10 | Stop reason: HOSPADM

## 2023-09-07 RX ORDER — MAGNESIUM SULFATE 1 G/100ML
1 INJECTION INTRAVENOUS ONCE
Status: COMPLETED | OUTPATIENT
Start: 2023-09-07 | End: 2023-09-07

## 2023-09-07 RX ORDER — FLUTICASONE PROPIONATE 50 MCG
2 SPRAY, SUSPENSION (ML) NASAL DAILY
Status: DISCONTINUED | OUTPATIENT
Start: 2023-09-07 | End: 2023-09-10 | Stop reason: HOSPADM

## 2023-09-07 RX ORDER — LIDOCAINE HYDROCHLORIDE 10 MG/ML
10 INJECTION, SOLUTION INFILTRATION; PERINEURAL ONCE
Status: COMPLETED | OUTPATIENT
Start: 2023-09-07 | End: 2023-09-07

## 2023-09-07 RX ORDER — DIPHENHYDRAMINE HYDROCHLORIDE 50 MG/ML
25 INJECTION INTRAMUSCULAR; INTRAVENOUS ONCE
Status: COMPLETED | OUTPATIENT
Start: 2023-09-07 | End: 2023-09-07

## 2023-09-07 RX ADMIN — HYDROCODONE BITARTRATE AND ACETAMINOPHEN 1 TABLET: 5; 325 TABLET ORAL at 12:48

## 2023-09-07 RX ADMIN — FLUTICASONE PROPIONATE 2 SPRAY: 50 SPRAY, METERED NASAL at 16:35

## 2023-09-07 RX ADMIN — LIDOCAINE HYDROCHLORIDE 2 ML: 10 INJECTION, SOLUTION INFILTRATION; PERINEURAL at 11:06

## 2023-09-07 RX ADMIN — SENNOSIDES AND DOCUSATE SODIUM 2 TABLET: 50; 8.6 TABLET ORAL at 21:39

## 2023-09-07 RX ADMIN — ASPIRIN 325 MG: 325 TABLET ORAL at 08:39

## 2023-09-07 RX ADMIN — GADOBENATE DIMEGLUMINE 16.06 ML: 529 INJECTION, SOLUTION INTRAVENOUS at 07:44

## 2023-09-07 RX ADMIN — METHOCARBAMOL TABLETS 1000 MG: 500 TABLET, COATED ORAL at 17:50

## 2023-09-07 RX ADMIN — PROCHLORPERAZINE EDISYLATE 10 MG: 5 INJECTION INTRAMUSCULAR; INTRAVENOUS at 16:35

## 2023-09-07 RX ADMIN — ATORVASTATIN CALCIUM 80 MG: 80 TABLET, FILM COATED ORAL at 21:39

## 2023-09-07 RX ADMIN — MAGNESIUM SULFATE HEPTAHYDRATE 1 G: 1 INJECTION, SOLUTION INTRAVENOUS at 16:35

## 2023-09-07 RX ADMIN — Medication 10 ML: at 21:40

## 2023-09-07 RX ADMIN — METHOCARBAMOL TABLETS 1000 MG: 500 TABLET, COATED ORAL at 21:39

## 2023-09-07 RX ADMIN — DIPHENHYDRAMINE HYDROCHLORIDE 25 MG: 50 INJECTION, SOLUTION INTRAMUSCULAR; INTRAVENOUS at 16:35

## 2023-09-07 RX ADMIN — Medication 10 ML: at 08:39

## 2023-09-07 NOTE — PLAN OF CARE
Goal Outcome Evaluation:  Plan of Care Reviewed With: patient        Progress: no change     No new complains during this shift. Patient remains with left side weakness. LP completed today, c/o headache after procedure, relieved with Norco, migraine cocktail administered, pt on bedrest with bed flat until 1900,

## 2023-09-07 NOTE — THERAPY TREATMENT NOTE
Patient Name: Heriberto Fields  : 1965    MRN: [id number]                              Today's Date: 2023       Admit Date: 2023    Visit Dx:     ICD-10-CM ICD-9-CM   1. Left arm weakness  R29.898 729.89   2. Hyperglycemia  R73.9 790.29   3. Hypokalemia  E87.6 276.8   4. Facial droop  R29.810 781.94   5. History of stroke  Z86.73 V12.54     Patient Active Problem List   Diagnosis    Stroke    Left arm weakness    HLD (hyperlipidemia)     Past Medical History:   Diagnosis Date    GERD (gastroesophageal reflux disease)     Stroke      History reviewed. No pertinent surgical history.   General Information       Row Name 23 1037          Physical Therapy Time and Intention    Document Type therapy note (daily note)  -AR     Mode of Treatment physical therapy  -AR       Row Name 23 1037          General Information    Patient Profile Reviewed yes  -AR     Existing Precautions/Restrictions fall  -AR       Row Name 23 1037          Cognition    Orientation Status (Cognition) oriented x 3  -AR               User Key  (r) = Recorded By, (t) = Taken By, (c) = Cosigned By      Initials Name Provider Type    AR Rosemary Roque PT Physical Therapist                   Mobility       Row Name 23 1037          Bed Mobility    Supine-Sit [place] (Bed Mobility) minimum assist (75% patient effort)  -AR     Sit-Supine Early (Bed Mobility) not tested  -AR     Assistive Device (Bed Mobility) bed rails;head of bed elevated  -AR       Row Name 23 1037          Sit-Stand Transfer    Sit-Stand [place] (Transfers) minimum assist (75% patient effort)  -AR     Comment, (Sit-Stand Transfer) HHAx2  -AR       Row Name 23 1037          Gait/Stairs (Locomotion)    [place] Level (Gait) minimum assist (75% patient effort);2 person assist  -AR     Distance in Feet (Gait) HHAx2, steps twoards HOB then bed>chair, L knee with buckled once  -AR               User Key   (r) = Recorded By, (t) = Taken By, (c) = Cosigned By      Initials Name Provider Type    AR Rosemary Roque, ALANA Physical Therapist                   Obj/Interventions       Row Name 09/07/23 1038          Motor Skills    Therapeutic Exercise --  B LAQ, small range on L  -AR       Row Name 09/07/23 1038          Balance    Static Standing Balance minimal assist  -AR     Dynamic Standing Balance moderate assist  -AR               User Key  (r) = Recorded By, (t) = Taken By, (c) = Cosigned By      Initials Name Provider Type    Rosemary Snider, ALANA Physical Therapist                   Goals/Plan    No documentation.                  Clinical Impression       Row Name 09/07/23 1039          Pain    Pretreatment Pain Rating 0/10 - no pain  -AR     Posttreatment Pain Rating 0/10 - no pain  -AR     Pain Intervention(s) Repositioned  -AR       Row Name 09/07/23 1039          Plan of Care Review    Outcome Evaluation Continued L UE and LE weakness, neuro APRN states MRI negative for CVA, planning for lumbar puncture.  Pt able to take few small side steps bed>chair w/ min A x2 with L knee blocked;  L knee buckled once required increased assist.  Currently recommend DC to acute rehab.  -AR       Row Name 09/07/23 1039          Therapy Assessment/Plan (PT)    Rehab Potential (PT) good, to achieve stated therapy goals  -AR     Therapy Frequency (PT) 6 times/wk  -AR       Row Name 09/07/23 1039          Vital Signs    O2 Delivery Pre Treatment room air  -AR       Row Name 09/07/23 1039          Positioning and Restraints    Pre-Treatment Position in bed  -AR     Post Treatment Position chair  -AR     In Chair notified nsg;reclined;sitting;call light within reach;encouraged to call for assist;exit alarm on  -AR               User Key  (r) = Recorded By, (t) = Taken By, (c) = Cosigned By      Initials Name Provider Type    Rosemary Snider, PT Physical Therapist                   Outcome Measures       Row Name 09/07/23 1041           How much help from another person do you currently need...    Turning from your back to your side while in flat bed without using bedrails? 3  -AR     Moving from lying on back to sitting on the side of a flat bed without bedrails? 3  -AR     Moving to and from a bed to a chair (including a wheelchair)? 3  -AR     Standing up from a chair using your arms (e.g., wheelchair, bedside chair)? 2  -AR     Climbing 3-5 steps with a railing? 1  -AR     To walk in hospital room? 2  -AR     AM-PAC 6 Clicks Score (PT) 14  -AR     Highest level of mobility 4 --> Transferred to chair/commode  -AR       Row Name 09/07/23 1041          Functional Assessment    Outcome Measure Options AM-PAC 6 Clicks Basic Mobility (PT)  -AR               User Key  (r) = Recorded By, (t) = Taken By, (c) = Cosigned By      Initials Name Provider Type    Rosemary Snider, PT Physical Therapist                                 Physical Therapy Education       Title: PT OT SLP Therapies (In Progress)       Topic: Physical Therapy (In Progress)       Point: Mobility training (In Progress)       Learning Progress Summary             Patient Acceptance, E, NR by AR at 9/7/2023 1041    Acceptance, E,D, DU by PC at 9/6/2023 1617                         Point: Home exercise program (In Progress)       Learning Progress Summary             Patient Acceptance, E, NR by AR at 9/7/2023 1041    Acceptance, E,D, DU by PC at 9/6/2023 1617                         Point: Body mechanics (In Progress)       Learning Progress Summary             Patient Acceptance, E, NR by AR at 9/7/2023 1041    Acceptance, E,D, DU by PC at 9/6/2023 1617                         Point: Precautions (In Progress)       Learning Progress Summary             Patient Acceptance, E, NR by AR at 9/7/2023 1041    Acceptance, E,D, DU by PC at 9/6/2023 1617                                         User Key       Initials Effective Dates Name Provider Type [facility] 06/16/21 -   Lisa Leung, PT Physical Therapist PT    AR 06/16/21 -  Rosemary Roque PT Physical Therapist PT                  PT Recommendation and Plan     Outcome Evaluation: Continued L UE and LE weakness, neuro APRN states MRI negative for CVA, planning for lumbar puncture.  Pt able to take few small side steps bed>chair w/ min A x2 with L knee blocked;  L knee buckled once required increased assist.  Currently recommend DC to acute rehab.     Time Calculation:         PT Charges       Row Name 09/07/23 [facility]             Time Calculation    Start Time 0935  -AR      Stop Time 0952  -AR      Time Calculation (min) 17 min  -AR      PT Received On 09/07/23  -AR      PT - Next Appointment 09/08/23  -AR                User Key  (r) = Recorded By, (t) = Taken By, (c) = Cosigned By      Initials Name Provider Type    Rosemary Snider PT Physical Therapist                  Therapy Charges for Today       Code Description Service Date Service Provider Modifiers Qty    85841129482 HC PT THER PROC EA 15 MIN 9/7/2023 Rosemary Roque, PT GP 1    12222657031 HC PT THER SUPP EA 15 MIN 9/7/2023 Rosemary Roque, PT GP 1            PT G-Codes  Outcome Measure Options: AM-PAC 6 Clicks Basic Mobility (PT)  AM-PAC 6 Clicks Score (PT): 14  AM-PAC 6 Clicks Score (OT): 8  Modified [place] Scale: 4 - Moderately severe disability.  Unable to walk without assistance, and unable to attend to own bodily needs without assistance.  PT Discharge Summary  Anticipated Discharge Disposition (PT): inpatient rehabilitation facility    Rosemary Roque PT  9/7/2023

## 2023-09-07 NOTE — PROGRESS NOTES
"DOS: 2023  NAME: Heriberto Fields   : 1965  PCP: Courtney Croft APRN  Chief Complaint   Patient presents with    Extremity Weakness     Neurology    Subjective:     Interval History  Pt seen in follow up today, however the problem is new to the examiner.  Patient Complaints:  No acute events overnight.  Patient denies dizziness however states he does have a headache that started around 3:00 this morning.  Patient still complaining of left arm and left leg weakness but left arm is antigravity.  When asked to hold left arm or left leg up he says I cannot.  Physical therapy at bedside preparing for evaluation.  No family at bedside.  History taken from: patient chart    Objective:  Vital signs: /91 (BP Location: Left arm, Patient Position: Lying)   Pulse 89   Temp 98.6 °F (37 °C) (Oral)   Resp 16   Ht 160 cm (63\")   Wt 80.5 kg (177 lb 7.5 oz)   SpO2 95%   BMI 31.44 kg/m²       Physical Exam:  GENERAL: NAD, alert and cooperative  HEENT: Normocephalic, atraumatic   Resp: Even and unlabored, no audible wheezes  Skin: Warm and dry, no rashes or birth marks.   Psychiatric: Normal mood and affect.    Neurological:   MS: AOx3, recent/remote memory intact, normal attention/concentration, language intact, no neglect   CN: visual acuity grossly normal, PERRL, EOMI, no nystagmus, no facial droop, no dysarthria, hearing symmetric, tongue midline, shoulder shrug symmetric  Motor: LUE 3+/5. RLE 2+/5. Normal tone. No tremor noted.  Sensory: Intact to light touch in all four ext.  Coordination: No dysmetria finger to nose test  Gait and station: Deferred  Reflexes: 2+ patellar, achilles. Down-going toes    Results Review: I have reviewed MRI brain and see no evidence of an acute infarct   I reviewed the patient's new clinical results.  I reviewed the patient's new imaging results and agree with the interpretation.  I reviewed the patient's other test results and agree with the " interpretation  Discussed with Dr. Ashford    Current Medications:  Scheduled Medications:aspirin, 325 mg, Oral, Daily   Or  aspirin, 300 mg, Rectal, Daily  atorvastatin, 80 mg, Oral, Nightly  docusate sodium, 100 mg, Oral, Daily  fluticasone, 2 spray, Each Nare, Daily  methocarbamol, 1,000 mg, Oral, 4x Daily  senna-docusate sodium, 2 tablet, Oral, BID  sodium chloride, 10 mL, Intravenous, Q12H      Infusions: hold, 1 each      PRN Medications:    acetaminophen **OR** acetaminophen    acetaminophen **OR** acetaminophen    aluminum-magnesium hydroxide-simethicone    aluminum-magnesium hydroxide-simethicone    senna-docusate sodium **AND** polyethylene glycol **AND** bisacodyl **AND** bisacodyl    fentaNYL citrate (PF)    hold    HYDROcodone-acetaminophen    ondansetron **OR** ondansetron    Potassium Replacement - Follow Nurse / BPA Driven Protocol    sodium chloride    sodium chloride    sodium chloride    sodium chloride    Laboratory results:  Lab Results   Component Value Date    GLUCOSE 131 (H) 09/05/2023    CALCIUM 8.1 (L) 09/05/2023     09/05/2023    K 4.4 09/05/2023    CO2 21.9 (L) 09/05/2023     09/05/2023    BUN 5 (L) 09/05/2023    CREATININE 0.89 09/05/2023    BCR 5.6 (L) 09/05/2023    ANIONGAP 13.1 09/05/2023     Lab Results   Component Value Date    WBC 5.89 09/05/2023    HGB 13.7 09/05/2023    HCT 41.0 09/05/2023    MCV 94.0 09/05/2023     09/05/2023      Results from last 7 days   Lab Units 09/05/23  0344   CHOLESTEROL mg/dL 210*     Lab Results   Component Value Date    INR 0.88 (L) 09/04/2023    PROTIME 12.1 09/04/2023     Lab Results   Component Value Date    TSH 3.120 01/04/2022     No components found for: LDLCALC  Lab Results   Component Value Date    HGBA1C 5.60 09/05/2023     No components found for: B12    Review and interpretation of imaging:   Adult Transthoracic Echo Complete W/ Cont if Necessary Per Protocol (With Agitated Saline)    Result Date: 9/5/2023    Left  ventricular systolic function is normal. Left ventricular ejection fraction appears to be 61 - 65%.   Left ventricular diastolic function was normal.   The right ventricular cavity is borderline dilated. Normal right ventricular systolic function noted.   Saline test results are negative.   Insufficient TR velocity profile to estimate the right ventricular systolic pressure.   There is no evidence of pericardial effusion.     CT Head Without Contrast    Result Date: 9/5/2023  CT OF THE HEAD WITHOUT CONTRAST  HISTORY: Right leg weakness  COMPARISON: None available.  TECHNIQUE: Axial CT imaging was obtained through the brain. No IV contrast was administered.  FINDINGS: No acute intracranial hemorrhage is seen. Ventricles are normal in size. There is no midline shift or mass effect. No definite focal areas of decreased attenuation are seen. Sensitive opacification of the ethmoid sinuses is seen, and there is mucosal thickening noted within the sphenoid and maxillary sinuses. Patient does have a mucous retention cyst within the left sphenoid sinus.      No acute intracranial findings.  Radiation dose reduction techniques were utilized, including automated exposure control and exposure modulation based on body size.   This report was finalized on 9/5/2023 5:04 AM by Dr. Colette Hurd M.D.      CT Head Without Contrast    Result Date: 9/4/2023  CT OF THE HEAD WITHOUT CONTRAST  HISTORY: Right leg weakness following tPA administration  COMPARISON: September 4, 2023  TECHNIQUE: Axial CT imaging was obtained through the brain. No IV contrast was administered.  FINDINGS: No acute intracranial hemorrhage is seen. The ventricles are normal in size. There is no midline shift or mass effect. No focal areas of decreased attenuation are identified.      No acute intracranial findings.  Radiation dose reduction techniques were utilized, including automated exposure control and exposure modulation based on body size.   This report  was finalized on 9/4/2023 11:27 PM by Dr. Colette Hurd M.D.      CT Angiogram Neck    Result Date: 9/4/2023  NONCONTRAST CRANIAL CT SCAN, CT ANGIOGRAM NECK, CT ANGIOGRAM HEAD, CRANIAL CT PERFUSION.  HISTORY: Acute CVA,  COMPARISON: None..  TECHNIQUE:  Radiation dose reduction techniques were utilized, including automated exposure control and exposure modulation based on body size. Initially, a routine noncontrast cranial CT performed from the skull base through the vertex region. After review, thin-section contrast enhanced CT angiogram images obtained from the aortic arch through the calvarial vertex utilizing angiographic technique. Multi projection 3-D MIP reformatted images were supplemented and reviewed. Subsequently, CT perfusion imaging performed with ischemia view software.  FINDINGS CRANIAL CT: No acute hemorrhage or midline shift is demonstrated..  Ventricular size and configuration is within normal limits for age.. Postcontrast imaging does not show any evidence of venous occlusion or abnormal enhancement. Bone window images Reveal no significant osseous findings..  Study was placed on line at 9:10 p.m. Preliminary interpretation telephoned to extension [phone] during interpretation at 9:12 p.m.   CERVICAL CAROTID CT ANGIOGRAM:  FINDINGS: There is normal arch anatomy. The common carotid arteries are widely patent bilaterally. The internal carotid arteries are widely patent bilaterally. The vertebral arteries are unremarkable bilaterally. Images through the lung apices demonstrate some minimal dependent atelectasis. Thyroid gland and trachea appear normal.   NASCET criteria utilized in stenosis measurements. stenosis mild, 0-49%.   CRANIAL CTA ANGIOGRAM:  FINDINGS: The intracranial internal carotid arteries are widely patent. There may be a tiny anterior communicating artery. Anterior cerebral arteries are normal the middle cerebral arteries are unremarkable bilaterally. Intracranial vertebral arteries  are normal, as is the basilar artery. Both posterior cerebral arteries appear normal..      CT PERFUSION:  FINDINGS: No areas of cerebral blood flow less than 30% or Tmax greater than 6 seconds are identified. Scattered areas of Tmax greater than 4 seconds are identified within both cerebral hemispheres, more significant on the left. Clinical significance is uncertain..  Study was placed on line at 9:21 p.m. Preliminary interpretation telephoned to extension [phone] during interpretation at 9:38 p.m.  AI analysis of LVO was utilized.  Radiation dose reduction techniques were utilized, including automated exposure control and exposure modulation based on body size.       IMPRESSION :  1. No acute intracranial findings. 2. No cervical vascular stenosis or occlusion. 3. No intracranial stenosis or occlusion. 4. No areas of cerebral blood flow less than 30% or Tmax greater than 6 seconds are seen.   This report was finalized on 9/4/2023 11:24 PM by Dr. Colette Hurd M.D.      MRI Brain Without Contrast    Result Date: 9/5/2023  MRI BRAIN WO CONTRAST-  INDICATIONS: Left-sided weakness  TECHNIQUE: Multiplanar multisequence non-contrast magnetic resonance imaging of the brain.  COMPARISON: Head CT from same date  FINDINGS:  The diffusion-weighted images, correlated with ADC mapping, show no restricted diffusion to suggest acute infarct.  The midline structures appear unremarkable.  The brain parenchyma shows several small foci of T2 FLAIR signal hyperintensity in the deep and subcortical periventricular white matter, although nonspecific, suggesting chronic small vessel ischemic change in a patient this age (some other possibilities could for example include demyelination, Lyme disease, sequela of migrainous headaches/vasculitis).  Flow voids in the major arteries at the base of the brain appear unremarkable.  Mild paranasal sinus mucosal thickening is present. The paranasal sinuses, mastoid air cells, and orbits appear  otherwise unremarkable.       No acute infarct. Mild nonspecific periventricular white matter changes.  This report was finalized on 9/5/2023 2:24 PM by Dr. Kirt Paul M.D.      MRI Brain With & Without Contrast    Result Date: 9/7/2023  MRI OF THE BRAIN AND CERVICAL SPINE WITH AND WITHOUT CONTRAST  CLINICAL HISTORY: Left arm weakness. Evaluate for demyelinating disease.  TECHNIQUE: MRI of the brain was obtained with sagittal pre and postgadolinium T1, axial pre-gadolinium and postgadolinium, coronal postgadolinium T1,  axial FLAIR, axial T2, axial susceptibility weighted, and axial diffusion-weighted images.  COMPARISON: Brain MRI dated 09/05/2023.  FINDINGS:  There are multiple small nodular foci of FLAIR and T2 white matter hyperintensity that are noted within the relatively peripheral white matter of the left occipital lobe, left parietal lobe, and both frontal lobes that are seen in a pattern most suggestive of mild changes of chronic small vessel ischemic phenomenon. There is no periventricular, corpus callosal, juxtacortical, or infratentorial white matter signal abnormality. No abnormal foci of contrast enhancement are noted.  The ventricles, sulci, and cisterns are age-appropriate. There are findings compatible with a small chronic infarct within the left cerebellar hemisphere within the left PICA distribution measuring up to 9 x 2 mm in greatest axial dimensions. The major intracranial flow related signal voids are unremarkable. The midline intracranial anatomy is unremarkable.      There are multiple small nodular foci of FLAIR and T2 white matter hyperintensity within the relatively peripheral white matter of the left occipital lobe, left parietal lobe, both frontal lobes that are noted in a pattern most suggestive of mild changes of chronic small vessel ischemic phenomenon. There is no convincing evidence to suggest foci of demyelination. Also, in specific, these nodular foci of white matter  signal abnormality do not satisfy the revised Beltre criteria for dissemination in space.  Incidental note is made of a small chronic infarct within the left cerebellar hemisphere measuring up to approximately 9 x 2 mm in greatest axial dimensions.  TECHNIQUE: MRI of the cervical spine was obtained with sagittal pre and postgadolinium T1, gradient echo, proton density, and T2 weighted images. Additionally, there are axial pre and post gadolinium T1, gradient echo, and T2 weighted images through the cervical spine.  FINDINGS: There are no abnormal foci of signal intensity within the cervical spinal cord to suggest foci of demyelination.  At C2-3, there is mild right foraminal narrowing secondary to facet hypertrophic change. There is no significant degree of left foraminal narrowing or canal stenosis.  At C3-4, there are prominent arthritic changes within the left C3-4 facet joint with prominent arthritic fluid noted within the facet joint as well as adjacent arthritic marrow edema. There is a most mild left foraminal narrowing secondary to facet hypertrophic change. There is no significant degree of right foraminal narrowing or canal stenosis.  At C4-5, there is no significant degree of canal or foraminal stenosis.  At C5-6, there are advanced degenerative disc changes. There is a disc osteophyte complex results in a mild degree of canal stenosis. There is a moderate to severe degree of right foraminal narrowing secondary to uncovertebral joint hypertrophy. There is only at most mild left foraminal narrowing secondary to uncovertebral joint hypertrophy.  At C6-7, there is a disc osteophyte complex which results in a mild degree of canal stenosis. Prominent degenerative disc changes are again appreciated with adjacent degenerative endplate marrow changes. There is a moderate degree of left foraminal narrowing secondary to uncovertebral joint hypertrophy and a mild to moderate degree of right foraminal narrowing  also secondary to uncovertebral joint hypertrophy.  At C7-T1, there is no significant degree of canal or foraminal stenosis.  IMPRESSION: There is no evidence to suggest a demyelinating process within the cervical spine.  Prominent degenerative disc changes are seen at C5-6 and C6-7 with adjacent degenerative endplate marrow changes. Disc osteophyte complexes at C5-6 and C6-7 result in mild degrees of canal narrowing. These are the levels in the most prominent canal stenosis.  Multilevel foraminal narrowing is also appreciated within the cervical spine and most prominently identified at the C5-6 and C6-7 levels. These foraminal stenotic changes are as discussed in detail above.  Prominent left facet arthritic changes are seen within the left C3-4 facet joint.        MRI Cervical Spine With & Without Contrast    Result Date: 9/7/2023  MRI OF THE BRAIN AND CERVICAL SPINE WITH AND WITHOUT CONTRAST  CLINICAL HISTORY: Left arm weakness. Evaluate for demyelinating disease.  TECHNIQUE: MRI of the brain was obtained with sagittal pre and postgadolinium T1, axial pre-gadolinium and postgadolinium, coronal postgadolinium T1,  axial FLAIR, axial T2, axial susceptibility weighted, and axial diffusion-weighted images.  COMPARISON: Brain MRI dated 09/05/2023.  FINDINGS:  There are multiple small nodular foci of FLAIR and T2 white matter hyperintensity that are noted within the relatively peripheral white matter of the left occipital lobe, left parietal lobe, and both frontal lobes that are seen in a pattern most suggestive of mild changes of chronic small vessel ischemic phenomenon. There is no periventricular, corpus callosal, juxtacortical, or infratentorial white matter signal abnormality. No abnormal foci of contrast enhancement are noted.  The ventricles, sulci, and cisterns are age-appropriate. There are findings compatible with a small chronic infarct within the left cerebellar hemisphere within the left PICA distribution  measuring up to 9 x 2 mm in greatest axial dimensions. The major intracranial flow related signal voids are unremarkable. The midline intracranial anatomy is unremarkable.      There are multiple small nodular foci of FLAIR and T2 white matter hyperintensity within the relatively peripheral white matter of the left occipital lobe, left parietal lobe, both frontal lobes that are noted in a pattern most suggestive of mild changes of chronic small vessel ischemic phenomenon. There is no convincing evidence to suggest foci of demyelination. Also, in specific, these nodular foci of white matter signal abnormality do not satisfy the revised Beltre criteria for dissemination in space.  Incidental note is made of a small chronic infarct within the left cerebellar hemisphere measuring up to approximately 9 x 2 mm in greatest axial dimensions.  TECHNIQUE: MRI of the cervical spine was obtained with sagittal pre and postgadolinium T1, gradient echo, proton density, and T2 weighted images. Additionally, there are axial pre and post gadolinium T1, gradient echo, and T2 weighted images through the cervical spine.  FINDINGS: There are no abnormal foci of signal intensity within the cervical spinal cord to suggest foci of demyelination.  At C2-3, there is mild right foraminal narrowing secondary to facet hypertrophic change. There is no significant degree of left foraminal narrowing or canal stenosis.  At C3-4, there are prominent arthritic changes within the left C3-4 facet joint with prominent arthritic fluid noted within the facet joint as well as adjacent arthritic marrow edema. There is a most mild left foraminal narrowing secondary to facet hypertrophic change. There is no significant degree of right foraminal narrowing or canal stenosis.  At C4-5, there is no significant degree of canal or foraminal stenosis.  At C5-6, there are advanced degenerative disc changes. There is a disc osteophyte complex results in a mild degree  of canal stenosis. There is a moderate to severe degree of right foraminal narrowing secondary to uncovertebral joint hypertrophy. There is only at most mild left foraminal narrowing secondary to uncovertebral joint hypertrophy.  At C6-7, there is a disc osteophyte complex which results in a mild degree of canal stenosis. Prominent degenerative disc changes are again appreciated with adjacent degenerative endplate marrow changes. There is a moderate degree of left foraminal narrowing secondary to uncovertebral joint hypertrophy and a mild to moderate degree of right foraminal narrowing also secondary to uncovertebral joint hypertrophy.  At C7-T1, there is no significant degree of canal or foraminal stenosis.  IMPRESSION: There is no evidence to suggest a demyelinating process within the cervical spine.  Prominent degenerative disc changes are seen at C5-6 and C6-7 with adjacent degenerative endplate marrow changes. Disc osteophyte complexes at C5-6 and C6-7 result in mild degrees of canal narrowing. These are the levels in the most prominent canal stenosis.  Multilevel foraminal narrowing is also appreciated within the cervical spine and most prominently identified at the C5-6 and C6-7 levels. These foraminal stenotic changes are as discussed in detail above.  Prominent left facet arthritic changes are seen within the left C3-4 facet joint.        XR Chest 1 View    Result Date: 9/4/2023  SINGLE VIEW OF THE CHEST  HISTORY: Congestion  COMPARISON: None available.  FINDINGS: There does appear to be cardiomegaly. There is no vascular congestion. No pneumothorax or definite pleural effusion is seen. The patient has bibasilar atelectasis.      Bibasilar atelectasis.  This report was finalized on 9/4/2023 10:29 PM by Dr. Colette Hurd M.D.      IR LUMBAR PUNCTURE DIAGNOSTIC    Result Date: 9/7/2023  FLUOROSCOPIC GUIDED LUMBAR PUNCTURE  HISTORY: T2 flair changes on MRI brain, LUE/LLE weakness; R29.898-Other symptoms  and signs involving the musculoskeletal system; R73.9-Hyperglycemia, unspecified; E87.6-Hypokalemia; R29.810-Facial weakness; Z86.73-Personal history of transient ischemic attack (TIA), and cerebral infarction without residual deficits   TECHNIQUE: Risks, benefits and alternatives were discussed with the patient. Informed consent obtained. A pre-procedure timeout was performed confirming correct patient and procedure.  The patient was placed on the fluoroscopy table, and the skin overlying the lumbar spine was prepped and draped in the usual sterile fashion with 2% chlorhexidine. 1% lidocaine was utilized for local anesthesia.  Next, a 20-gauge spinal needle was then advanced into the CSF space under fluoroscopic guidance at the L4 level. Approximately 10 mL of clear CSF was then obtained and submitted to the laboratory for evaluation.  The patient tolerated the procedure well without immediate complication.       Technically successful fluoroscopically guided lumbar puncture.  Reference air kerma: 12.2 mGy  This report was finalized on 9/7/2023 11:34 AM by Dr. Benigno Carr M.D.      CT Angiogram Head w AI Analysis of LVO    Result Date: 9/4/2023  NONCONTRAST CRANIAL CT SCAN, CT ANGIOGRAM NECK, CT ANGIOGRAM HEAD, CRANIAL CT PERFUSION.  HISTORY: Acute CVA,  COMPARISON: None..  TECHNIQUE:  Radiation dose reduction techniques were utilized, including automated exposure control and exposure modulation based on body size. Initially, a routine noncontrast cranial CT performed from the skull base through the vertex region. After review, thin-section contrast enhanced CT angiogram images obtained from the aortic arch through the calvarial vertex utilizing angiographic technique. Multi projection 3-D MIP reformatted images were supplemented and reviewed. Subsequently, CT perfusion imaging performed with ischemia view software.  FINDINGS CRANIAL CT: No acute hemorrhage or midline shift is demonstrated..  Ventricular size  and configuration is within normal limits for age.. Postcontrast imaging does not show any evidence of venous occlusion or abnormal enhancement. Bone window images Reveal no significant osseous findings..  Study was placed on line at 9:10 p.m. Preliminary interpretation telephoned to extension [phone] during interpretation at 9:12 p.m.   CERVICAL CAROTID CT ANGIOGRAM:  FINDINGS: There is normal arch anatomy. The common carotid arteries are widely patent bilaterally. The internal carotid arteries are widely patent bilaterally. The vertebral arteries are unremarkable bilaterally. Images through the lung apices demonstrate some minimal dependent atelectasis. Thyroid gland and trachea appear normal.   NASCET criteria utilized in stenosis measurements. stenosis mild, 0-49%.   CRANIAL CTA ANGIOGRAM:  FINDINGS: The intracranial internal carotid arteries are widely patent. There may be a tiny anterior communicating artery. Anterior cerebral arteries are normal the middle cerebral arteries are unremarkable bilaterally. Intracranial vertebral arteries are normal, as is the basilar artery. Both posterior cerebral arteries appear normal..      CT PERFUSION:  FINDINGS: No areas of cerebral blood flow less than 30% or Tmax greater than 6 seconds are identified. Scattered areas of Tmax greater than 4 seconds are identified within both cerebral hemispheres, more significant on the left. Clinical significance is uncertain..  Study was placed on line at 9:21 p.m. Preliminary interpretation telephoned to extension [phone] during interpretation at 9:38 p.m.  AI analysis of LVO was utilized.  Radiation dose reduction techniques were utilized, including automated exposure control and exposure modulation based on body size.       IMPRESSION :  1. No acute intracranial findings. 2. No cervical vascular stenosis or occlusion. 3. No intracranial stenosis or occlusion. 4. No areas of cerebral blood flow less than 30% or Tmax greater than 6 seconds  are seen.   This report was finalized on 9/4/2023 11:24 PM by Dr. Colette Hurd M.D.      FL Video Swallow Single Contrast    Result Date: 9/6/2023  VIDEO SWALLOWING EXAMINATION BY SPEECH PATHOLOGY  Clinical: Dysphasia  Video swallowing examination performed under the direction of speech pathology. Imaging reviewed by radiologist who concurs with the findings.  Speech pathology summary: Radiologist present, Dr. Hunter. High transient penetration during the swallow with thin liquids. No aspiration visualized.   FLUOROSCOPY TIME: 38 seconds, 1097 images.  This report was finalized on 9/6/2023 3:38 PM by Dr. Vinicius Hunter M.D.      CT CEREBRAL PERFUSION WITH & WITHOUT CONTRAST    Result Date: 9/4/2023  NONCONTRAST CRANIAL CT SCAN, CT ANGIOGRAM NECK, CT ANGIOGRAM HEAD, CRANIAL CT PERFUSION.  HISTORY: Acute CVA,  COMPARISON: None..  TECHNIQUE:  Radiation dose reduction techniques were utilized, including automated exposure control and exposure modulation based on body size. Initially, a routine noncontrast cranial CT performed from the skull base through the vertex region. After review, thin-section contrast enhanced CT angiogram images obtained from the aortic arch through the calvarial vertex utilizing angiographic technique. Multi projection 3-D MIP reformatted images were supplemented and reviewed. Subsequently, CT perfusion imaging performed with ischemia view software.  FINDINGS CRANIAL CT: No acute hemorrhage or midline shift is demonstrated..  Ventricular size and configuration is within normal limits for age.. Postcontrast imaging does not show any evidence of venous occlusion or abnormal enhancement. Bone window images Reveal no significant osseous findings..  Study was placed on line at 9:10 p.m. Preliminary interpretation telephoned to extension [phone] during interpretation at 9:12 p.m.   CERVICAL CAROTID CT ANGIOGRAM:  FINDINGS: There is normal arch anatomy. The common carotid arteries are widely patent  bilaterally. The internal carotid arteries are widely patent bilaterally. The vertebral arteries are unremarkable bilaterally. Images through the lung apices demonstrate some minimal dependent atelectasis. Thyroid gland and trachea appear normal.   NASCET criteria utilized in stenosis measurements. stenosis mild, 0-49%.   CRANIAL CTA ANGIOGRAM:  FINDINGS: The intracranial internal carotid arteries are widely patent. There may be a tiny anterior communicating artery. Anterior cerebral arteries are normal the middle cerebral arteries are unremarkable bilaterally. Intracranial vertebral arteries are normal, as is the basilar artery. Both posterior cerebral arteries appear normal..      CT PERFUSION:  FINDINGS: No areas of cerebral blood flow less than 30% or Tmax greater than 6 seconds are identified. Scattered areas of Tmax greater than 4 seconds are identified within both cerebral hemispheres, more significant on the left. Clinical significance is uncertain..  Study was placed on line at 9:21 p.m. Preliminary interpretation telephoned to extension [phone] during interpretation at 9:38 p.m.  AI analysis of LVO was utilized.  Radiation dose reduction techniques were utilized, including automated exposure control and exposure modulation based on body size.       IMPRESSION :  1. No acute intracranial findings. 2. No cervical vascular stenosis or occlusion. 3. No intracranial stenosis or occlusion. 4. No areas of cerebral blood flow less than 30% or Tmax greater than 6 seconds are seen.   This report was finalized on 9/4/2023 11:24 PM by Dr. Colette Hurd M.D.       Work-up to date:  CTA head/neck, CTP 9/4: No acute intracranial findings.  No cervical vascular stenosis or occlusion.  No intracranial stenosis or occlusion.  No perfusion abnormalities noted.  ECG 9/4: Sinus tachycardia, prolonged QT interval 387 MS  MRI brain wo 9/5: No acute infarct.  Brain parenchyma shows several small foci of T2 flair signal  hyperintensity that are not although nonspecific suggestive of chronic small vessel ischemic change, other possibilities could include demyelination, Lyme disease, sequelae of migrainous headaches/vasculitis.  TTE: EF appears 61 to 65%.  All left ventricular wall segments contract normally.  No evidence of a left ventricular thrombus present.  Normal left atrial cavity size, saline test results negative.  MRI brain w/wo 9/7: No evidence of acute infarct.  Small nodular foci of FLAIR and T2 white matter hyperintensity within white matter of left occipital lobe, left parietal lobe, both frontal lobes noted in a pattern suggestive of mild changes of chronic small vessel ischemic phenomenon.  No convincing evidence to suggest foci of demyelination.  MRI C-spine w/wo: No abnormal foci of signal intensity within the cervical spinal cord to suggest foci of demyelination or severe canal stenosis.  Minute degenerative disc changes seen at C5-6 and C6-7.  CTH wo 9/4: No acute intracranial findings status post tPA administration  CTH wo 9/5: No acute intracranial findings  LP 9/7: Glucose 61, protein 47.1, RBC 1/0 , TNC 0/2    Lab Review: A1c 5.6%,      Diagnoses:  Left sided weakness  Hyperlipidemia    Impression: Mr. Fields is a 58-year-old male with a past medical history of hyperlipidemia and previous stroke 5 years ago with no residual deficits.  He presented to [facility] 9/4/2023 with an acute onset of left-sided weakness.  He was within the window for TNK and received TNK after head CT did not show any acute hypodensity or hyperdensity.  CTA head and neck and CTP were unremarkable.  He underwent MRI brain imaging which showed no acute stroke however there was T2 flair changes concerning for small vessel disease versus demyelinating disease.  A repeat MRI brain with and without contrast as well as an MRI of the C-spine was completed which showed no abnormal foci of signal intensity to suggest demyelination.   "LP was completed today, is negative.  Awaiting several CSF studies.  Demyelinating process versus stroke work-up is negative.  On exam patient is mostly effort dependent on his left side.  Differential also includes hemiplegic migraine or functional neurologic disorder and for that we recommend the patient follow-up with behavioral health at discharge. Migraine cocktail of benadryl 25mg, compazine 10mg, and magnesium 1g ordered to be given x1. We will sign off and follow-up with patient in the coming weeks.  Call RRT for acute neurologic change or concern.    Plan:  Follow-up CSF oligoclonal banding, non-gyn cytology, CSF culture, Lyme panel, MS profile, MOG ab, NMO ab  Migraine cocktail of benadryl, compazine, and magnesium x1   Recommend behavioral health follow-up at discharge  Continue home statin for primary stroke prevention,   Therapies as written  CCP for discharge planning    We will see patient neuro follow-up with Dr. Kirt Del Toro -electronic message sent    I have discussed the above with the patient and Dr. Ashford who are in agreement with the plan.     Lissy Carnes, APRN  09/07/23  15:26 EDT    \"Dictated utilizing Dragon dictation\".     "

## 2023-09-07 NOTE — SIGNIFICANT NOTE
09/07/23 1429   OTHER   Discipline occupational therapist   Rehab Time/Intention   Session Not Performed unable to treat, medical status change  (on bedrest 1900 after lumbar puncture.  Hold OT today)   Recommendation   OT - Next Appointment 09/08/23

## 2023-09-07 NOTE — PROGRESS NOTES
Name: Heriberto Fields ADMIT: 2023   : 1965  PCP: Courtney Croft APRN    MRN: [id number] LOS: 3 days   AGE/SEX: 58 y.o. male  ROOM: [facility]     Subjective   Subjective   No new complaints, asking to have his home Robaxin resumed.     Objective   Objective   Vital Signs  Temp:  [97.5 °F (36.4 °C)-98.8 °F (37.1 °C)] 97.5 °F (36.4 °C)  Heart Rate:  [53-86] 74  Resp:  [16] 16  BP: (118-151)/(76-98) 151/92  SpO2:  [93 %-98 %] 98 %  on   ;   Device (Oxygen Therapy): room air  Body mass index is 31.44 kg/m².  Physical Exam  Constitutional:       General: He is not in acute distress.  Cardiovascular:      Rate and Rhythm: Normal rate.      Pulses: Normal pulses.   Pulmonary:      Effort: Pulmonary effort is normal.   Abdominal:      General: Abdomen is flat.   Neurological:      Mental Status: He is alert.      Comments: RUE and RLE 5/5; LUE 5/5, LLE 4/5       Results Review     I reviewed the patient's new clinical results.  Results from last 7 days   Lab Units 23  0344 23  2102   WBC 10*3/mm3 5.89 10.26   HEMOGLOBIN g/dL 13.7 16.3   PLATELETS 10*3/mm3 221 300     Results from last 7 days   Lab Units 23  2223 23  0344 23  2102   SODIUM mmol/L  --  141 142   POTASSIUM mmol/L 4.4 3.4* 3.2*   CHLORIDE mmol/L  --  106 105   CO2 mmol/L  --  21.9* 18.6*   BUN mg/dL  --  5* 8   CREATININE mg/dL  --  0.89 1.14   GLUCOSE mg/dL  --  131* 241*   EGFR mL/min/1.73  --  98.1 73.6     Results from last 7 days   Lab Units 23  2102   ALBUMIN g/dL 4.5   BILIRUBIN mg/dL 0.3   ALK PHOS U/L 73   AST (SGOT) U/L 25   ALT (SGPT) U/L 34     Results from last 7 days   Lab Units 234 23  2102   CALCIUM mg/dL 8.1* 9.0   ALBUMIN g/dL  --  4.5       Hemoglobin A1C   Date/Time Value Ref Range Status   2023 0344 5.60 4.80 - 5.60 % Final     Glucose   Date/Time Value Ref Range Status   2023 0437 97 70 - 130 mg/dL Final   2023 0034 94 70 - 130 mg/dL Final    09/05/2023 2000 92 70 - 130 mg/dL Final   09/05/2023 1814 107 70 - 130 mg/dL Final   09/05/2023 1229 95 70 - 130 mg/dL Final   09/04/2023 2349 197 (H) 70 - 130 mg/dL Final   09/04/2023 2045 211 (H) 70 - 130 mg/dL Final       MRI Brain Without Contrast    Result Date: 9/5/2023   No acute infarct. Mild nonspecific periventricular white matter changes.  This report was finalized on 9/5/2023 2:24 PM by Dr. Kirt Paul M.D.     Scheduled Medications  aspirin, 325 mg, Oral, Daily   Or  aspirin, 300 mg, Rectal, Daily  atorvastatin, 80 mg, Oral, Nightly  docusate sodium, 100 mg, Oral, Daily  senna-docusate sodium, 2 tablet, Oral, BID  sodium chloride, 10 mL, Intravenous, Q12H    Infusions   Diet  Diet: Regular/House Diet; Texture: Regular Texture (IDDSI 7); Fluid Consistency: Thin (IDDSI 0)       Assessment/Plan     Active Hospital Problems    Diagnosis  POA    **Stroke [I63.9]  Yes    HLD (hyperlipidemia) [E78.5]  Yes    Left arm weakness [R29.898]  Yes      Resolved Hospital Problems   No resolved problems to display.       58 y.o. male admitted with Stroke.      09/07/23  PT recommending acute rehab.     L sided weakness  History of stroke without residual deficit  -s/p TNK  -CTA H/N without large vessel occlusion or stenosis  -MRI negative for acute abnormality  -MRI brain and C-spine w/out evidence of demyelinating disorder  -LP unremarkable   -Neuro following- most c/w hemiplegic migraine vs functional neurologic disorder -> behavioral health f/u at dc recommended   -PT/OT/speech  -atorvastatin 80 mg     HLD  -chol 210,   -atorvastatin as above     Hypokalemia, replete    SCDs for DVT prophylaxis.  Discussed with patient and nursing staff.  Anticipate discharge to acute rehab once arrangements have been made      Gregorio Ibrahim MD  [facility]  09/07/23  08:27 EDT

## 2023-09-07 NOTE — PROGRESS NOTES
[facility]    Medical workup ongoing. Will monitor for medical status as well as progress with therapies for determination of rehab needs and appropriateness.    Radha Alex RN  Rehab Admissions Coordinator  [phone]

## 2023-09-07 NOTE — PLAN OF CARE
Goal Outcome Evaluation:              Outcome Evaluation: Continued L UE and LE weakness, neuro APRN states MRI negative for CVA, planning for lumbar puncture.  Pt able to take few small side steps bed>chair w/ min A x2 with L knee blocked;  L knee buckled once required increased assist.  Currently recommend DC to acute rehab.      Anticipated Discharge Disposition (PT): inpatient rehabilitation facility

## 2023-09-07 NOTE — PLAN OF CARE
Goal Outcome Evaluation:   Patient alert and oriented. Pleasant and cooperative.  Able to make needs known. C/o headache.  Prn medication administered.  Continues on room air. No shortness of air noted. Left sided weakness r/t stroke.  NSR on the monitor.

## 2023-09-08 LAB
CYTO UR: NORMAL
LAB AP CASE REPORT: NORMAL
OLIGOCLONAL BANDS.IT SER+CSF QL: NORMAL
PATH REPORT.FINAL DX SPEC: NORMAL
PATH REPORT.GROSS SPEC: NORMAL

## 2023-09-08 PROCEDURE — 97530 THERAPEUTIC ACTIVITIES: CPT

## 2023-09-08 PROCEDURE — 97535 SELF CARE MNGMENT TRAINING: CPT

## 2023-09-08 RX ADMIN — Medication 10 ML: at 08:56

## 2023-09-08 RX ADMIN — ATORVASTATIN CALCIUM 80 MG: 80 TABLET, FILM COATED ORAL at 21:45

## 2023-09-08 RX ADMIN — METHOCARBAMOL TABLETS 1000 MG: 500 TABLET, COATED ORAL at 08:56

## 2023-09-08 RX ADMIN — METHOCARBAMOL TABLETS 1000 MG: 500 TABLET, COATED ORAL at 17:26

## 2023-09-08 RX ADMIN — DOCUSATE SODIUM 100 MG: 100 CAPSULE, LIQUID FILLED ORAL at 08:56

## 2023-09-08 RX ADMIN — SENNOSIDES AND DOCUSATE SODIUM 2 TABLET: 50; 8.6 TABLET ORAL at 08:56

## 2023-09-08 RX ADMIN — Medication 10 ML: at 21:46

## 2023-09-08 RX ADMIN — FLUTICASONE PROPIONATE 2 SPRAY: 50 SPRAY, METERED NASAL at 08:56

## 2023-09-08 RX ADMIN — METHOCARBAMOL TABLETS 1000 MG: 500 TABLET, COATED ORAL at 21:46

## 2023-09-08 RX ADMIN — METHOCARBAMOL TABLETS 1000 MG: 500 TABLET, COATED ORAL at 13:26

## 2023-09-08 NOTE — THERAPY TREATMENT NOTE
Patient Name: Heriberto Fields  : 1965    MRN: [id number]                              Today's Date: 2023       Admit Date: 2023    Visit Dx:     ICD-10-CM ICD-9-CM   1. Left arm weakness  R29.898 729.89   2. Hyperglycemia  R73.9 790.29   3. Hypokalemia  E87.6 276.8   4. Facial droop  R29.810 781.94   5. History of stroke  Z86.73 V12.54     Patient Active Problem List   Diagnosis    Stroke    Left arm weakness    HLD (hyperlipidemia)     Past Medical History:   Diagnosis Date    GERD (gastroesophageal reflux disease)     Stroke      History reviewed. No pertinent surgical history.   General Information       Row Name 23 1358          OT Time and Intention    Document Type therapy note (daily note)  -LE     Mode of Treatment individual therapy;occupational therapy  -LE       Row Name 23 1354          General Information    Existing Precautions/Restrictions fall  -LE       Row Name 23 1357          Cognition    Orientation Status (Cognition) oriented to;person  at times impaired/lack of response to directions during ADL and UE function.  ? language vs cognition.  -LE               User Key  (r) = Recorded By, (t) = Taken By, (c) = Cosigned By      Initials Name Provider Type    Yenny Babb OTR Occupational Therapist                     Mobility/ADL's       Row Name 23 6670          Bed Mobility    Bed Mobility supine-sit;sit-supine  -LE     Supine-Sit [place] (Bed Mobility) contact guard  -LE     Sit-Supine [place] (Bed Mobility) contact guard  -LE     Bed Mobility, Safety Issues decreased use of arms for pushing/pulling  -LE     Assistive Device (Bed Mobility) bed rails;head of bed elevated  -SARWAT     Comment, (Bed Mobility) superivison and prompting for seated scoot along EOB before laying down.  -LE       Row Name 23 1353          Activities of Daily Living    BADL Assessment/Intervention toileting;grooming;lower body dressing  -SARWAT       Row  Name 09/08/23 1359          Grooming Assessment/Training    [place] Level (Grooming) oral care regimen;wash face, hands;set up;standby assist;verbal cues;nonverbal cues (demo/gesture)  -LE     Position (Grooming) edge of bed sitting  -LE     Comment, (Grooming) prompt to use L and to open paste, apply paste. With cueing pt hold toothbrush in L hand and brushes teeth.  -[facility] Name 09/08/23 1359          Toileting Assessment/Training    Comment, (Toileting) urinal present.  pt reports using urinal on own and RN confirms.  -[facility] Name 09/08/23 1359          Lower Body Dressing Assessment/Training    [place] Level (Lower Body Dressing) socks;doff;don;set up;verbal cues;nonverbal cues (demo/gesture)  -LE     Position (Lower Body Dressing) edge of bed sitting  -LE     Comment, (Lower Body Dressing) cue to use L hand to help.  completes mostly with R hand.  -               User Key  (r) = Recorded By, (t) = Taken By, (c) = Cosigned By      Initials Name Provider Type    Yenny Babb, OTGURMEET Occupational Therapist                   Obj/Interventions       Row Name 09/08/23 1401          Sensory Assessment (Somatosensory)    Sensory Assessment ?L UE awareness  -[facility] Name 09/08/23 1401          Range of Motion Comprehensive    Comment, General Range of Motion L shld 1/2 then 1/3 when asked to raise.  with AAROM reaches end range.  -[facility] Name 09/08/23 1401          Strength Comprehensive (MMT)    Comment, General Manual Muscle Testing (MMT) Assessment L UE takes less than min resistance. R UE about min resistance.  ? pt understanding MMT commands  -[facility] Name 09/08/23 1401          Balance    Static Sitting Balance standby assist  -               User Key  (r) = Recorded By, (t) = Taken By, (c) = Cosigned By      Initials Name Provider Type    Yenny Babb, OTGURMEET Occupational Therapist                   Goals/Plan    No documentation.                  Clinical Impression        Row Name 09/08/23 1353          Pain Assessment    Pre/Posttreatment Pain Comment when asked if painful to move L UE pt replies yes.  points to neck and elbow.   no c/o pain at rest.  -LE     Pain Intervention(s) Repositioned  -SARWAT       Row Name 09/08/23 1353          Plan of Care Review    Plan of Care Reviewed With patient  -LE     Outcome Evaluation Pt presents to OT in bed. Pt declines up to chair as states up 2 hours today.  Pt does agree to sit EOB for grooming and  LUE tasks. Pt is CGA for supine to/from sit and cueing to scoot along EOB before returning supine.  Pt is SBA for static sitting balance.  Completes teeth brushing with set up and cueing by OT to use non dominant L hand for task. Pt requires cueing to use L hand even with bimanual tasks.  Pt performs bilateral reciprocal coordination with slight delay L UE. When asked to raise L UE intially 1/2 then less than 1/2 AROM.   Cue pt to look at L UE when using to give input.  Will cont to follow for skilled OT.  Noted ? etiology for deficits at this time.   Agree with plan for rehab as pt performing below baseline.  -SARWAT       Row Name 09/08/23 1353          Therapy Plan Review/Discharge Plan (OT)    Anticipated Discharge Disposition (OT) inpatient rehabilitation facility  -       Row Name 09/08/23 1353          Vital Signs    O2 Delivery Pre Treatment room air  -LE     Pre Patient Position Supine  -LE     Intra Patient Position Sitting  -LE     Post Patient Position Supine  -LE       Row Name 09/08/23 1353          Positioning and Restraints    Pre-Treatment Position in bed  -LE     Post Treatment Position bed  -LE     In Bed notified nsg;fowlers;call light within reach;encouraged to call for assist;exit alarm on  -LE               User Key  (r) = Recorded By, (t) = Taken By, (c) = Cosigned By      Initials Name Provider Type    Yenny Babb OTR Occupational Therapist                   Outcome Measures       Row Name 09/08/23 9586          How  much help from another is currently needed...    Putting on and taking off regular lower body clothing? 2  -LE     Bathing (including washing, rinsing, and drying) 2  -LE     Toileting (which includes using toilet bed pan or urinal) 2  -LE     Putting on and taking off regular upper body clothing 2  -LE     Taking care of personal grooming (such as brushing teeth) 3  -LE     Eating meals 3  -LE     AM-PAC 6 Clicks Score (OT) 14  -LE       Row Name 09/08/23 1229          How much help from another person do you currently need...    Turning from your back to your side while in flat bed without using bedrails? 3  -SV     Moving from lying on back to sitting on the side of a flat bed without bedrails? 3  -SV     Moving to and from a bed to a chair (including a wheelchair)? 3  -SV     Standing up from a chair using your arms (e.g., wheelchair, bedside chair)? 3  -SV     Climbing 3-5 steps with a railing? 1  -SV     To walk in hospital room? 2  -SV     AM-PAC 6 Clicks Score (PT) 15  -SV     Highest level of mobility 4 --> Transferred to chair/commode  -SV       Row Name 09/08/23 1407          Functional Assessment    Outcome Measure Options AM-PAC 6 Clicks Daily Activity (OT)  -LE               User Key  (r) = Recorded By, (t) = Taken By, (c) = Cosigned By      Initials Name Provider Type    Yenny Babb, OTR Occupational Therapist    Ela Ontiveros, PT Physical Therapist                    Occupational Therapy Education       Title: PT OT SLP Therapies (In Progress)       Topic: Occupational Therapy (Not Started)       Point: ADL training (Not Started)       Description:   Instruct learner(s) on proper safety adaptation and remediation techniques during self care or transfers.   Instruct in proper use of assistive devices.                  Learner Progress:  Not documented in this visit.              Point: Home exercise program (Not Started)       Description:   Instruct learner(s) on appropriate technique for  monitoring, assisting and/or progressing therapeutic exercises/activities.                  Learner Progress:  Not documented in this visit.              Point: Precautions (Not Started)       Description:   Instruct learner(s) on prescribed precautions during self-care and functional transfers.                  Learner Progress:  Not documented in this visit.              Point: Body mechanics (Not Started)       Description:   Instruct learner(s) on proper positioning and spine alignment during self-care, functional mobility activities and/or exercises.                  Learner Progress:  Not documented in this visit.                                  OT Recommendation and Plan     Plan of Care Review  Plan of Care Reviewed With: patient  Outcome Evaluation: Pt presents to OT in bed. Pt declines up to chair as states up 2 hours today.  Pt does agree to sit EOB for grooming and  LUE tasks. Pt is CGA for supine to/from sit and cueing to scoot along EOB before returning supine.  Pt is SBA for static sitting balance.  Completes teeth brushing with set up and cueing by OT to use non dominant L hand for task. Pt requires cueing to use L hand even with bimanual tasks.  Pt performs bilateral reciprocal coordination with slight delay L UE. When asked to raise L UE intially 1/2 then less than 1/2 AROM.   Cue pt to look at L UE when using to give input.  Will cont to follow for skilled OT.  Noted ? etiology for deficits at this time.   Agree with plan for rehab as pt performing below baseline.     Time Calculation:         Time Calculation- OT       Row Name 09/08/23 1403             Time Calculation- OT    OT Start Time 1330  -LE      OT Stop Time 1347  -LE      OT Time Calculation (min) 17 min  -LE      Total Timed Code Minutes- OT 17 minute(s)  -LE      OT Received On 09/08/23  -LE      OT - Next Appointment 09/11/23  -LE         Timed Charges    94613 - OT Self Care/Mgmt Minutes 17  -LE         Total Minutes    Timed  Charges Total Minutes 17  -LE       Total Minutes 17  -LE                User Key  (r) = Recorded By, (t) = Taken By, (c) = Cosigned By      Initials Name Provider Type    Yenny Babb OTR Occupational Therapist                  Therapy Charges for Today       Code Description Service Date Service Provider Modifiers Qty    [id number]  OT SELF CARE/MGMT/TRAIN EA 15 MIN 9/8/2023 Yenny Coleman OTR GO 1                 BART Hickey  9/8/2023

## 2023-09-08 NOTE — PLAN OF CARE
Problem: Fall Injury Risk  Goal: Absence of Fall and Fall-Related Injury  Outcome: Ongoing, Progressing  Intervention: Identify and Manage Contributors  Recent Flowsheet Documentation  Taken 9/8/2023 1200 by Lindsay Cohen RN  Medication Review/Management: medications reviewed  Taken 9/8/2023 1000 by Lindsay Cohen RN  Medication Review/Management: medications reviewed  Intervention: Promote Injury-Free Environment  Recent Flowsheet Documentation  Taken 9/8/2023 1800 by Lindsay Cohen RN  Safety Promotion/Fall Prevention:   activity supervised   nonskid shoes/slippers when out of bed   safety round/check completed   fall prevention program maintained   assistive device/personal items within reach  Taken 9/8/2023 1618 by Lindsay Cohen, RN  Safety Promotion/Fall Prevention:   safety round/check completed   activity supervised   assistive device/personal items within reach   nonskid shoes/slippers when out of bed   fall prevention program maintained  Taken 9/8/2023 1400 by Lindsay Cohen RN  Safety Promotion/Fall Prevention:   activity supervised   safety round/check completed   nonskid shoes/slippers when out of bed   fall prevention program maintained   assistive device/personal items within reach  Taken 9/8/2023 1200 by Lindsay Cohen RN  Safety Promotion/Fall Prevention:   activity supervised   nonskid shoes/slippers when out of bed   safety round/check completed   fall prevention program maintained   assistive device/personal items within reach  Taken 9/8/2023 1000 by Lindsay Cohen, RN  Safety Promotion/Fall Prevention:   activity supervised   safety round/check completed   nonskid shoes/slippers when out of bed   fall prevention program maintained   assistive device/personal items within reach  Taken 9/8/2023 0800 by Lindsay Cohen, RN  Safety Promotion/Fall Prevention:   activity supervised   safety round/check completed   nonskid shoes/slippers when out of bed   elopement precautions    assistive device/personal items within reach     Problem: Adjustment to Illness (Stroke, Ischemic/Transient Ischemic Attack)  Goal: Optimal Coping  Outcome: Ongoing, Progressing     Problem: Bowel Elimination Impaired (Stroke, Ischemic/Transient Ischemic Attack)  Goal: Effective Bowel Elimination  Outcome: Ongoing, Progressing     Problem: Cerebral Tissue Perfusion (Stroke, Ischemic/Transient Ischemic Attack)  Goal: Optimal Cerebral Tissue Perfusion  Outcome: Ongoing, Progressing     Problem: Cognitive Impairment (Stroke, Ischemic/Transient Ischemic Attack)  Goal: Optimal Cognitive Function  Outcome: Ongoing, Progressing     Problem: Communication Impairment (Stroke, Ischemic/Transient Ischemic Attack)  Goal: Improved Communication Skills  Outcome: Ongoing, Progressing     Problem: Functional Ability Impaired (Stroke, Ischemic/Transient Ischemic Attack)  Goal: Optimal Functional Ability  Outcome: Ongoing, Progressing  Intervention: Optimize Functional Ability  Recent Flowsheet Documentation  Taken 9/8/2023 1200 by Lindsay Cohen RN  Activity Management: up in chair  Taken 9/8/2023 1000 by Lindsay Cohen, RN  Activity Management: up in chair     Problem: Respiratory Compromise (Stroke, Ischemic/Transient Ischemic Attack)  Goal: Effective Oxygenation and Ventilation  Outcome: Ongoing, Progressing  Intervention: Optimize Oxygenation and Ventilation  Recent Flowsheet Documentation  Taken 9/8/2023 1800 by Lindsay Cohen, RN  Head of Bed (HOB) Positioning: HOB at 30-45 degrees  Taken 9/8/2023 0800 by Lindsay Cohen RN  Head of Bed (HOB) Positioning: HOB at 20-30 degrees     Problem: Sensorimotor Impairment (Stroke, Ischemic/Transient Ischemic Attack)  Goal: Improved Sensorimotor Function  Outcome: Ongoing, Progressing     Problem: Swallowing Impairment (Stroke, Ischemic/Transient Ischemic Attack)  Goal: Optimal Eating and Swallowing without Aspiration  Outcome: Ongoing, Progressing     Problem: Urinary  Elimination Impaired (Stroke, Ischemic/Transient Ischemic Attack)  Goal: Effective Urinary Elimination  Outcome: Ongoing, Progressing     Problem: Adult Inpatient Plan of Care  Goal: Plan of Care Review  Outcome: Ongoing, Progressing  Flowsheets (Taken 9/8/2023 1857)  Progress: no change  Plan of Care Reviewed With: patient     Problem: Skin Injury Risk Increased  Goal: Skin Health and Integrity  Outcome: Ongoing, Progressing  Intervention: Optimize Skin Protection  Recent Flowsheet Documentation  Taken 9/8/2023 1800 by Lindasy Cohen, RN  Head of Bed (HOB) Positioning: HOB at 30-45 degrees  Taken 9/8/2023 0800 by Lindsay Cohen, RN  Head of Bed (HOB) Positioning: HOB at 20-30 degrees   Goal Outcome Evaluation:  Plan of Care Reviewed With: patient        Progress: no change

## 2023-09-08 NOTE — DISCHARGE PLACEMENT REQUEST
"Heriberto Hernandez (58 y.o. Male)       Date of Birth   1965    Social Security Number       Address   [address] [place] [address]    Home Phone   [phone]    MRN   [id number]       Baptism   None    Marital Status                               Admission Date   9/4/23    Admission Type   Emergency    Admitting Provider   Adan Palma MD    Attending Provider   Matteo Rodriguez MD    Department, Room/Bed   [address], 90/1       Discharge Date       Discharge Disposition       Discharge Destination                                 Attending Provider: Matteo Rodriguez MD    Allergies: No Known Allergies    Isolation: None   Infection: None   Code Status: CPR    Ht: 160 cm (63\")   Wt: 80.5 kg (177 lb 7.5 oz)    Admission Cmt: None   Principal Problem: Stroke [I63.9]                   Active Insurance as of 9/4/2023       Primary Coverage       Payor Plan Insurance Group Employer/Plan Group    [facility] COMMUNITY PLAN [facility] COMMUNITY PLAN Levine, Susan. \Hospital Has a New Name and Outlook.\""       Payor Plan Address Payor Plan Phone Number Payor Plan Fax Number Effective Dates    [address]   1/1/2023 - None Entered    [facility] [id number]         Subscriber Name Subscriber Birth Date Member ID       HERIBERTO HERNANDEZ 1965 [id number]                     Emergency Contacts        (Rel.) Home Phone Work Phone Mobile Phone    Lona Fernandez (Spouse) -- -- [phone]                "

## 2023-09-08 NOTE — PLAN OF CARE
Goal Outcome Evaluation:  Plan of Care Reviewed With: patient           Outcome Evaluation: Pt presents to OT in bed. Pt declines up to chair as states up 2 hours today.  Pt does agree to sit EOB for grooming and  LUE tasks. Pt is CGA for supine to/from sit and cueing to scoot along EOB before returning supine.  Pt is SBA for static sitting balance.  Completes teeth brushing with set up and cueing by OT to use non dominant L hand for task. Pt requires cueing to use L hand even with bimanual tasks.  Pt performs bilateral reciprocal coordination with slight delay L UE. When asked to raise L UE intially 1/2 then less than 1/2 AROM.   Cue pt to look at L UE when using to give input.  Will cont to follow for skilled OT.  Noted ? etiology for deficits at this time.   Agree with plan for rehab as pt performing below baseline.      Anticipated Discharge Disposition (OT): inpatient rehabilitation facility

## 2023-09-08 NOTE — PLAN OF CARE
Goal Outcome Evaluation:      Pt alert and agreeable to PT today. Pt moved to eob with min/cga sup to sit. Bed to chair with minx1 LLE blocked. STS from recliner with cga and vc. Pushed up from chair but bed rail RUE and CHair back for LUE weight bearing and support. Pt able to stand with cga for weight shifting and standing balance activities. LUE and LLE exercises performed supine and sitting today. Pt encouraged to perform ad andi today . Pt tolerated well and appears improved today with bed mobility, transfers and balance.

## 2023-09-08 NOTE — PROGRESS NOTES
[facility]    Eval complete and discussed with Dr. Bowden. Patient does not meet criteria for admission to [facility]. Discussed with CCP. Will sign off.    Radha Alex RN  Rehab Admissions Coordinator  [phone]

## 2023-09-08 NOTE — PROGRESS NOTES
Name: Heriberto Fields ADMIT: 2023   : 1965  PCP: Courtney Croft APRN    MRN: [id number] LOS: 4 days   AGE/SEX: 58 y.o. male  ROOM: [facility]     Subjective   Subjective   Chief Complaint   Patient presents with    Extremity Weakness     Reported feeling better.  He did not report any headache to me this morning.  No chest pain palpitation shortness of breath.  No nausea vomiting or abdominal pain     Objective   Objective   Vital Signs  Temp:  [97.7 °F (36.5 °C)-98.6 °F (37 °C)] 97.7 °F (36.5 °C)  Heart Rate:  [76-89] 84  Resp:  [16] 16  BP: (108-135)/(76-92) 124/81  SpO2:  [95 %-98 %] 95 %  on   ;   Device (Oxygen Therapy): room air  Body mass index is 31.44 kg/m².    Physical Exam  Vitals and nursing note reviewed.   Constitutional:       General: He is not in acute distress.     Appearance: He is not diaphoretic.   Eyes:      General:         Right eye: No discharge.         Left eye: No discharge.      Conjunctiva/sclera: Conjunctivae normal.   Cardiovascular:      Rate and Rhythm: Normal rate and regular rhythm.      Pulses: Normal pulses.   Pulmonary:      Effort: Pulmonary effort is normal.      Breath sounds: No wheezing.   Abdominal:      General: There is no distension.      Palpations: Abdomen is soft.      Tenderness: There is no abdominal tenderness. There is no guarding or rebound.   Musculoskeletal:         General: No swelling or tenderness.   Skin:     General: Skin is warm and dry.   Neurological:      Mental Status: He is alert.      Cranial Nerves: No cranial nerve deficit.      Motor: Weakness present.      Comments: RUE and RLE 5/5; LUE 5/5, LLE 4/5   Psychiatric:         Mood and Affect: Mood normal.         Behavior: Behavior normal.     Results Review  I reviewed the patient's new clinical results.    Results from last 7 days   Lab Units 23  0344 23  2102   WBC 10*3/mm3 5.89 10.26   HEMOGLOBIN g/dL 13.7 16.3   PLATELETS 10*3/mm3 221 300     Results  from last 7 days   Lab Units 09/05/23  2223 09/05/23  0344 09/04/23 2102   SODIUM mmol/L  --  141 142   POTASSIUM mmol/L 4.4 3.4* 3.2*   CHLORIDE mmol/L  --  106 105   CO2 mmol/L  --  21.9* 18.6*   BUN mg/dL  --  5* 8   CREATININE mg/dL  --  0.89 1.14   GLUCOSE mg/dL  --  131* 241*   EGFR mL/min/1.73  --  98.1 73.6     Results from last 7 days   Lab Units 09/04/23  2102   ALBUMIN g/dL 4.5   BILIRUBIN mg/dL 0.3   ALK PHOS U/L 73   AST (SGOT) U/L 25   ALT (SGPT) U/L 34     Results from last 7 days   Lab Units 09/05/23  0344 09/04/23 2102   CALCIUM mg/dL 8.1* 9.0   ALBUMIN g/dL  --  4.5         Glucose   Date/Time Value Ref Range Status   09/06/2023 0437 97 70 - 130 mg/dL Final   09/06/2023 0034 94 70 - 130 mg/dL Final   09/05/2023 2000 92 70 - 130 mg/dL Final   09/05/2023 1814 107 70 - 130 mg/dL Final   09/05/2023 1229 95 70 - 130 mg/dL Final       MRI Brain With & Without Contrast    Result Date: 9/8/2023   There are multiple small nodular foci of FLAIR and T2 white matter hyperintensity within the relatively peripheral white matter of the left occipital lobe, left parietal lobe, and both frontal lobes that are noted in a pattern most suggestive of mild changes of chronic small vessel ischemic phenomenon. There is no convincing evidence to suggest foci of demyelination. Also, in specific, these nodular foci of white matter signal abnormality do not satisfy the 2017 revised Beltre criteria for dissemination in space.  Incidental note is made of small chronic infarcts within the cerebellar hemispheres within the PICA distributions.   TECHNIQUE: MRI of the cervical spine was obtained with sagittal pre and postgadolinium T1, gradient echo, proton density, and T2 weighted images. Additionally, there are axial pre and post gadolinium T1, gradient echo, and T2 weighted images through the cervical spine.  FINDINGS:  There are no abnormal foci of signal intensity within the cervical spinal cord to suggest foci of  demyelination.  At C2-3, there is mild right foraminal narrowing secondary to facet hypertrophic change. There is no significant degree of left foraminal narrowing or canal stenosis.  At C3-4, there are prominent arthritic changes within the left C3-4 facet joint with prominent arthritic fluid noted within the facet joint as well as adjacent arthritic marrow edema. There is moderate left foraminal narrowing secondary to facet hypertrophic change. There is no significant degree of right foraminal narrowing or canal stenosis.  At C4-5, there is no significant degree of canal or foraminal stenosis.  At C5-6, there are advanced degenerative disc changes. There is a disc osteophyte complex results in a mild degree of canal stenosis. There is a moderate to severe degree of right foraminal narrowing secondary to uncovertebral joint hypertrophy. There is only at most mild left foraminal narrowing secondary to uncovertebral joint hypertrophy.  At C6-7, there is a disc osteophyte complex which results in a mild degree of canal stenosis. Prominent degenerative disc changes are again appreciated with adjacent degenerative endplate marrow changes. There is a moderate degree of left foraminal narrowing secondary to uncovertebral joint hypertrophy and a mild to moderate degree of right foraminal narrowing also secondary to uncovertebral joint hypertrophy.  At C7-T1, there is no significant degree of canal or foraminal stenosis.  IMPRESSION:  There is no evidence to suggest a demyelinating process within the cervical spine.  Prominent degenerative disc changes are seen at C5-6 and C6-7 with adjacent degenerative endplate marrow changes. Disc osteophyte complexes at C5-6 and C6-7 result in mild degrees of canal narrowing. These are the levels in the most prominent canal stenosis.  Multilevel foraminal narrowing is also appreciated within the cervical spine and most prominently identified at the C3-4, C5-6, and C6-7 levels. These  foraminal stenotic changes are as discussed in detail above.  Prominent left facet arthritic changes are seen within the left C3-4 facet joint.    This report was finalized on 9/8/2023 8:08 AM by Dr. Kit Portillo M.D.      MRI Cervical Spine With & Without Contrast    Result Date: 9/8/2023   There are multiple small nodular foci of FLAIR and T2 white matter hyperintensity within the relatively peripheral white matter of the left occipital lobe, left parietal lobe, and both frontal lobes that are noted in a pattern most suggestive of mild changes of chronic small vessel ischemic phenomenon. There is no convincing evidence to suggest foci of demyelination. Also, in specific, these nodular foci of white matter signal abnormality do not satisfy the 2017 revised Beltre criteria for dissemination in space.  Incidental note is made of small chronic infarcts within the cerebellar hemispheres within the PICA distributions.   TECHNIQUE: MRI of the cervical spine was obtained with sagittal pre and postgadolinium T1, gradient echo, proton density, and T2 weighted images. Additionally, there are axial pre and post gadolinium T1, gradient echo, and T2 weighted images through the cervical spine.  FINDINGS:  There are no abnormal foci of signal intensity within the cervical spinal cord to suggest foci of demyelination.  At C2-3, there is mild right foraminal narrowing secondary to facet hypertrophic change. There is no significant degree of left foraminal narrowing or canal stenosis.  At C3-4, there are prominent arthritic changes within the left C3-4 facet joint with prominent arthritic fluid noted within the facet joint as well as adjacent arthritic marrow edema. There is moderate left foraminal narrowing secondary to facet hypertrophic change. There is no significant degree of right foraminal narrowing or canal stenosis.  At C4-5, there is no significant degree of canal or foraminal stenosis.  At C5-6, there are advanced  degenerative disc changes. There is a disc osteophyte complex results in a mild degree of canal stenosis. There is a moderate to severe degree of right foraminal narrowing secondary to uncovertebral joint hypertrophy. There is only at most mild left foraminal narrowing secondary to uncovertebral joint hypertrophy.  At C6-7, there is a disc osteophyte complex which results in a mild degree of canal stenosis. Prominent degenerative disc changes are again appreciated with adjacent degenerative endplate marrow changes. There is a moderate degree of left foraminal narrowing secondary to uncovertebral joint hypertrophy and a mild to moderate degree of right foraminal narrowing also secondary to uncovertebral joint hypertrophy.  At C7-T1, there is no significant degree of canal or foraminal stenosis.  IMPRESSION:  There is no evidence to suggest a demyelinating process within the cervical spine.  Prominent degenerative disc changes are seen at C5-6 and C6-7 with adjacent degenerative endplate marrow changes. Disc osteophyte complexes at C5-6 and C6-7 result in mild degrees of canal narrowing. These are the levels in the most prominent canal stenosis.  Multilevel foraminal narrowing is also appreciated within the cervical spine and most prominently identified at the C3-4, C5-6, and C6-7 levels. These foraminal stenotic changes are as discussed in detail above.  Prominent left facet arthritic changes are seen within the left C3-4 facet joint.    This report was finalized on 9/8/2023 8:08 AM by Dr. Kit Portillo M.D.      IR LUMBAR PUNCTURE DIAGNOSTIC    Result Date: 9/7/2023  Technically successful fluoroscopically guided lumbar puncture.  Reference air kerma: 12.2 mGy  This report was finalized on 9/7/2023 11:34 AM by Dr. Benigno Carr M.D.       I have personally reviewed all medications:  Scheduled Medications  atorvastatin, 80 mg, Oral, Nightly  docusate sodium, 100 mg, Oral, Daily  fluticasone, 2 spray, Each Nare,  Daily  methocarbamol, 1,000 mg, Oral, 4x Daily  senna-docusate sodium, 2 tablet, Oral, BID  sodium chloride, 10 mL, Intravenous, Q12H      Infusions  hold, 1 each      Diet  Diet: Regular/House Diet; Texture: Regular Texture (IDDSI 7); Fluid Consistency: Thin (IDDSI 0)    I have personally reviewed:  [x]  Laboratory   [x]  Microbiology   [x]  Radiology   [x]  EKG/Telemetry  [x]  Cardiology/Vascular   []  Pathology    []  Records       Assessment/Plan     Active Hospital Problems    Diagnosis  POA    **Stroke [I63.9]  Yes    HLD (hyperlipidemia) [E78.5]  Yes    Left arm weakness [R29.898]  Yes      Resolved Hospital Problems   No resolved problems to display.       58 y.o. male admitted with Stroke.    Left-sided weakness/headache/history of stroke: Received TNK on admission.  Demyelinating process versus stroke work-up was negative.  He did undergo lumbar puncture with results pending.  Potential hemiplegic migraine or functional or neurologic disorder.  Neurology evaluated and plan outpatient follow-up.  Hyperlipidemia: Statin  Hypokalemia: Improved.  Check magnesium level  PPX: SCD  Disposition: Acute rehab/pending placement    Expected Discharge Date: 9/11/2023; Expected Discharge Time:      Matteo Rodriguez MD  [facility]  09/08/23  09:44 EDT    Dictated portions of note using Dragon dictation software.  Copied text in this note has been reviewed by me and remains accurate as of 09/08/23

## 2023-09-08 NOTE — THERAPY TREATMENT NOTE
Patient Name: Heriberto Fields  : 1965    MRN: [id number]                              Today's Date: 2023       Admit Date: 2023    Visit Dx:     ICD-10-CM ICD-9-CM   1. Left arm weakness  R29.898 729.89   2. Hyperglycemia  R73.9 790.29   3. Hypokalemia  E87.6 276.8   4. Facial droop  R29.810 781.94   5. History of stroke  Z86.73 V12.54     Patient Active Problem List   Diagnosis    Stroke    Left arm weakness    HLD (hyperlipidemia)     Past Medical History:   Diagnosis Date    GERD (gastroesophageal reflux disease)     Stroke      History reviewed. No pertinent surgical history.   General Information       Row Name 23 1220          Physical Therapy Time and Intention    Document Type therapy note (daily note)  -SV     Mode of Treatment individual therapy;physical therapy  -SV       Row Name 23 1220          General Information    Patient Profile Reviewed yes  -SV               User Key  (r) = Recorded By, (t) = Taken By, (c) = Cosigned By      Initials Name Provider Type    SV Ela Condon, PT Physical Therapist                   Mobility       Row Name 23 1221          Bed Mobility    Supine-Sit [place] (Bed Mobility) contact guard;minimum assist (75% patient effort)  -SV     Assistive Device (Bed Mobility) bed rails  -SV     Comment, (Bed Mobility) bed flat  -SV       Row Name 23 1221          Bed-Chair Transfer    Bed-Chair [place] (Transfers) minimum assist (75% patient effort)  -SV     Comment, (Bed-Chair Transfer) Chair turned for transfer right extremities leading to chair , left knee/foot blocked  -SV       Row Name 23 1221          Sit-Stand Transfer    Sit-Stand [place] (Transfers) contact guard;minimum assist (75% patient effort);verbal cues  -SV     Comment, (Sit-Stand Transfer) vc to weight bear BUE, chair back for LUE support/weight bearing, bed railing for RUE support  -SV       Row Name 23 1221          Gait/Stairs  (Locomotion)    [place] Level (Gait) contact guard  -SV     Distance in Feet (Gait) BUE support for weight shifting left/right in standing for 2-3 minutes, no left knee buckle noted  -SV               User Key  (r) = Recorded By, (t) = Taken By, (c) = Cosigned By      Initials Name Provider Type    SV Ela Condon, PT Physical Therapist                   Obj/Interventions       Row Name 09/08/23 1223          Motor Skills    Therapeutic Exercise --  LUE: encouraged full rom attempts( gripping and finger ext, finger abd, thumb abd/add and ext. forearm pron/supination ( 3-5 reps in supine, LLE:HS, hip int/ext rot in hooklying ( 3-5 ) partial rom LTR as tolerated 2 reps, Pt scooted up indep but labored  -SV     Additional Documentation --  attempted one bridge pt reported back uncomfortable so discontinued  -SV       Row Name 09/08/23 1223          Balance    Comment, Balance dynamic standing balance: stood with UE support while elevating opposite arm and maintaing balance, 3-5 reps tosha : no LOB noted, no unsteadiness  -SV               User Key  (r) = Recorded By, (t) = Taken By, (c) = Cosigned By      Initials Name Provider Type    SV Ela Condon, PT Physical Therapist                   Goals/Plan    No documentation.                  Clinical Impression       Row Name 09/08/23 1228          Pain    Pretreatment Pain Rating 0/10 - no pain  -SV     Posttreatment Pain Rating 0/10 - no pain  -SV     Pre/Posttreatment Pain Comment denies headache, or any pain following PT today  -SV     Pain Intervention(s) Repositioned  -SV       Row Name 09/08/23 1228          Vital Signs    O2 Delivery Pre Treatment room air  -SV     O2 Delivery Intra Treatment room air  -SV     Post SpO2 (%) 96  -SV     O2 Delivery Post Treatment room air  -SV     Pre Patient Position Supine  -SV     Intra Patient Position Standing  -SV     Post Patient Position Sitting  -SV       Row Name 09/08/23 1228          Positioning and  Restraints    Pre-Treatment Position in bed  -SV     Post Treatment Position chair  -SV     In Chair reclined;sitting;call light within reach;exit alarm on;encouraged to call for assist  -SV               User Key  (r) = Recorded By, (t) = Taken By, (c) = Cosigned By      Initials Name Provider Type    Ela Ontiveros, PT Physical Therapist                   Outcome Measures       Row Name 09/08/23 1229          How much help from another person do you currently need...    Turning from your back to your side while in flat bed without using bedrails? 3  -SV     Moving from lying on back to sitting on the side of a flat bed without bedrails? 3  -SV     Moving to and from a bed to a chair (including a wheelchair)? 3  -SV     Standing up from a chair using your arms (e.g., wheelchair, bedside chair)? 3  -SV     Climbing 3-5 steps with a railing? 1  -SV     To walk in hospital room? 2  -SV     AM-PAC 6 Clicks Score (PT) 15  -SV     Highest level of mobility 4 --> Transferred to chair/commode  -SV               User Key  (r) = Recorded By, (t) = Taken By, (c) = Cosigned By      Initials Name Provider Type    Ela Ontiveros, PT Physical Therapist                                 Physical Therapy Education       Title: PT OT SLP Therapies (In Progress)       Topic: Physical Therapy (In Progress)       Point: Mobility training (Done)       Learning Progress Summary             Patient Acceptance, E, VU,NR by SV at 9/8/2023 1229    Acceptance, E, NR by AR at 9/7/2023 1041    Acceptance, E,D, DU by PC at 9/6/2023 1617                         Point: Home exercise program (Done)       Learning Progress Summary             Patient Acceptance, E, VU,NR by SV at 9/8/2023 1229    Acceptance, E, NR by AR at 9/7/2023 1041    Acceptance, E,D, DU by PC at 9/6/2023 1617                         Point: Body mechanics (In Progress)       Learning Progress Summary             Patient Acceptance, E, NR by AR at 9/7/2023 1041     Acceptance, E,D, DU by PC at 9/6/2023 1617                         Point: Precautions (In Progress)       Learning Progress Summary             Patient Acceptance, E, NR by AR at 9/7/2023 1041    Acceptance, E,D, DU by PC at 9/6/2023 1617                                         User Key       Initials Effective Dates Name Provider Type Discipline    PC 06/16/21 -  Lisa Leung, PT Physical Therapist PT    AR 06/16/21 -  Rosemary Roque, PT Physical Therapist PT    SV 07/11/23 -  Ela Condon, PT Physical Therapist PT                  PT Recommendation and Plan           Time Calculation:         PT Charges       Row Name 09/08/23 1234             Time Calculation    Start Time 0918  -SV      Stop Time 0950  -SV      Time Calculation (min) 32 min  -SV      PT Received On 09/08/23  -SV      PT - Next Appointment 09/09/23  -SV                User Key  (r) = Recorded By, (t) = Taken By, (c) = Cosigned By      Initials Name Provider Type    SV Ela Condon, PT Physical Therapist                  Therapy Charges for Today       Code Description Service Date Service Provider Modifiers Qty    [id number]  PT THERAPEUTIC ACT EA 15 MIN 9/8/2023 Ela Condon, PT GP 2            PT G-Codes  Outcome Measure Options: AM-PAC 6 Clicks Basic Mobility (PT)  AM-PAC 6 Clicks Score (PT): 15  AM-PAC 6 Clicks Score (OT): 8  Modified [place] Scale: 4 - Moderately severe disability.  Unable to walk without assistance, and unable to attend to own bodily needs without assistance.       Ela Condon, ALANA  9/8/2023

## 2023-09-08 NOTE — CASE MANAGEMENT/SOCIAL WORK
Continued Stay Note  [facility]     Patient Name: Heriberto Fields  MRN: [id number]  Today's Date: 9/8/2023    Admit Date: 9/4/2023    Plan: SNF refferals pending   Discharge Plan       Row Name 09/08/23 1454       Plan    Plan SNF refferals pending    Patient/Family in Agreement with Plan yes    Plan Comments CCP met with patient at bedside. Discussed [facility] acute denial. Patient was ok with broad SNF referrals being made. CCP made referrals.                   Discharge Codes    No documentation.                 Expected Discharge Date and Time       Expected Discharge Date Expected Discharge Time    Sep 11, 2023

## 2023-09-09 LAB
ANION GAP SERPL CALCULATED.3IONS-SCNC: 11 MMOL/L (ref 5–15)
BUN SERPL-MCNC: 13 MG/DL (ref 8–23)
BUN/CREAT SERPL: 14.3 (ref 7–25)
CALCIUM SPEC-SCNC: 9.1 MG/DL (ref 8.6–10.5)
CHLORIDE SERPL-SCNC: 103 MMOL/L (ref 98–107)
CO2 SERPL-SCNC: 25 MMOL/L (ref 22–29)
CREAT SERPL-MCNC: 0.91 MG/DL (ref 0.76–1.27)
DEPRECATED RDW RBC AUTO: 46.2 FL (ref 37–54)
EGFRCR SERPLBLD CKD-EPI 2021: 96.5 ML/MIN/1.73
ERYTHROCYTE [DISTWIDTH] IN BLOOD BY AUTOMATED COUNT: 13.6 % (ref 12.3–15.4)
GLUCOSE SERPL-MCNC: 106 MG/DL (ref 65–99)
HCT VFR BLD AUTO: 45.6 % (ref 37.5–51)
HGB BLD-MCNC: 15.8 G/DL (ref 13–17.7)
MAGNESIUM SERPL-MCNC: 2.2 MG/DL (ref 1.6–2.4)
MCH RBC QN AUTO: 32.4 PG (ref 26.6–33)
MCHC RBC AUTO-ENTMCNC: 34.6 G/DL (ref 31.5–35.7)
MCV RBC AUTO: 93.4 FL (ref 79–97)
PLATELET # BLD AUTO: 218 10*3/MM3 (ref 140–450)
PMV BLD AUTO: 9.2 FL (ref 6–12)
POTASSIUM SERPL-SCNC: 4 MMOL/L (ref 3.5–5.2)
RBC # BLD AUTO: 4.88 10*6/MM3 (ref 4.14–5.8)
SODIUM SERPL-SCNC: 139 MMOL/L (ref 136–145)
WBC NRBC COR # BLD: 8.98 10*3/MM3 (ref 3.4–10.8)

## 2023-09-09 PROCEDURE — 83735 ASSAY OF MAGNESIUM: CPT | Performed by: INTERNAL MEDICINE

## 2023-09-09 PROCEDURE — 85027 COMPLETE CBC AUTOMATED: CPT | Performed by: INTERNAL MEDICINE

## 2023-09-09 PROCEDURE — 80048 BASIC METABOLIC PNL TOTAL CA: CPT | Performed by: INTERNAL MEDICINE

## 2023-09-09 PROCEDURE — 97530 THERAPEUTIC ACTIVITIES: CPT

## 2023-09-09 RX ADMIN — ATORVASTATIN CALCIUM 80 MG: 80 TABLET, FILM COATED ORAL at 20:17

## 2023-09-09 RX ADMIN — Medication 10 ML: at 20:26

## 2023-09-09 RX ADMIN — METHOCARBAMOL TABLETS 1000 MG: 500 TABLET, COATED ORAL at 20:17

## 2023-09-09 RX ADMIN — METHOCARBAMOL TABLETS 1000 MG: 500 TABLET, COATED ORAL at 12:42

## 2023-09-09 RX ADMIN — METHOCARBAMOL TABLETS 1000 MG: 500 TABLET, COATED ORAL at 17:53

## 2023-09-09 RX ADMIN — FLUTICASONE PROPIONATE 2 SPRAY: 50 SPRAY, METERED NASAL at 08:48

## 2023-09-09 RX ADMIN — METHOCARBAMOL TABLETS 1000 MG: 500 TABLET, COATED ORAL at 08:48

## 2023-09-09 RX ADMIN — Medication 10 ML: at 08:50

## 2023-09-09 NOTE — PLAN OF CARE
"Goal Outcome Evaluation:  Plan of Care Reviewed With: patient        Progress: improving  Outcome Evaluation: Pt says he os \"feeling better\". Pt still has numbness in L leg; no numbness/ tingling in L arm or face. L arm still weaker than right. Pt also says that headache has subsided.         "

## 2023-09-09 NOTE — PLAN OF CARE
Problem: Fall Injury Risk  Goal: Absence of Fall and Fall-Related Injury  Outcome: Ongoing, Progressing  Intervention: Promote Injury-Free Environment  Recent Flowsheet Documentation  Taken 9/9/2023 1400 by Lindsay Cohen, RN  Safety Promotion/Fall Prevention: safety round/check completed  Taken 9/9/2023 1200 by Lindsay Cohen RN  Safety Promotion/Fall Prevention:   activity supervised   safety round/check completed  Taken 9/9/2023 1000 by Lindsay Cohen RN  Safety Promotion/Fall Prevention:   activity supervised   safety round/check completed   nonskid shoes/slippers when out of bed   fall prevention program maintained   assistive device/personal items within reach  Taken 9/9/2023 0800 by Lindsay Cohen RN  Safety Promotion/Fall Prevention:   activity supervised   safety round/check completed   nonskid shoes/slippers when out of bed   fall prevention program maintained   assistive device/personal items within reach     Problem: Adjustment to Illness (Stroke, Ischemic/Transient Ischemic Attack)  Goal: Optimal Coping  Outcome: Ongoing, Progressing     Problem: Bowel Elimination Impaired (Stroke, Ischemic/Transient Ischemic Attack)  Goal: Effective Bowel Elimination  Outcome: Ongoing, Progressing     Problem: Cerebral Tissue Perfusion (Stroke, Ischemic/Transient Ischemic Attack)  Goal: Optimal Cerebral Tissue Perfusion  Outcome: Ongoing, Progressing     Problem: Cognitive Impairment (Stroke, Ischemic/Transient Ischemic Attack)  Goal: Optimal Cognitive Function  Outcome: Ongoing, Progressing     Problem: Communication Impairment (Stroke, Ischemic/Transient Ischemic Attack)  Goal: Improved Communication Skills  Outcome: Ongoing, Progressing     Problem: Functional Ability Impaired (Stroke, Ischemic/Transient Ischemic Attack)  Goal: Optimal Functional Ability  Outcome: Ongoing, Progressing     Problem: Respiratory Compromise (Stroke, Ischemic/Transient Ischemic Attack)  Goal: Effective Oxygenation and  Ventilation  Outcome: Ongoing, Progressing     Problem: Sensorimotor Impairment (Stroke, Ischemic/Transient Ischemic Attack)  Goal: Improved Sensorimotor Function  Outcome: Ongoing, Progressing     Problem: Swallowing Impairment (Stroke, Ischemic/Transient Ischemic Attack)  Goal: Optimal Eating and Swallowing without Aspiration  Outcome: Ongoing, Progressing     Problem: Urinary Elimination Impaired (Stroke, Ischemic/Transient Ischemic Attack)  Goal: Effective Urinary Elimination  Outcome: Ongoing, Progressing     Problem: Adult Inpatient Plan of Care  Goal: Plan of Care Review  Outcome: Ongoing, Progressing  Flowsheets (Taken 9/9/2023 0317)  Progress: improving  Plan of Care Reviewed With: patient     Problem: Skin Injury Risk Increased  Goal: Skin Health and Integrity  Outcome: Ongoing, Progressing   Goal Outcome Evaluation:  Plan of Care Reviewed With: patient        Progress: improving

## 2023-09-09 NOTE — THERAPY TREATMENT NOTE
Patient Name: Heriberto Fields  : 1965    MRN: [id number]                              Today's Date: 2023       Admit Date: 2023    Visit Dx:     ICD-10-CM ICD-9-CM   1. Left arm weakness  R29.898 729.89   2. Hyperglycemia  R73.9 790.29   3. Hypokalemia  E87.6 276.8   4. Facial droop  R29.810 781.94   5. History of stroke  Z86.73 V12.54     Patient Active Problem List   Diagnosis    Stroke    Left arm weakness    HLD (hyperlipidemia)     Past Medical History:   Diagnosis Date    GERD (gastroesophageal reflux disease)     Stroke      History reviewed. No pertinent surgical history.   General Information       Row Name 23 1248          Physical Therapy Time and Intention    Document Type therapy note (daily note)  -RS     Mode of Treatment individual therapy;physical therapy  -RS       Row Name 23 1248          General Information    Existing Precautions/Restrictions fall  -RS               User Key  (r) = Recorded By, (t) = Taken By, (c) = Cosigned By      Initials Name Provider Type    RS Robyn Blanco PT Physical Therapist                   Mobility       Row Name 23 1248          Bed Mobility    Supine-Sit [place] (Bed Mobility) supervision  -RS     Sit-Supine [place] (Bed Mobility) supervision  -RS     Assistive Device (Bed Mobility) head of bed elevated;bed rails  -RS       Row Name 23 1248          Bed-Chair Transfer    Bed-Chair [place] (Transfers) contact guard;supervision  -RS     Comment, (Bed-Chair Transfer) no AD  -RS       Row Name 23 1248          Sit-Stand Transfer    Sit-Stand [place] (Transfers) contact guard;verbal cues;supervision  -RS     Comment, (Sit-Stand Transfer) no AD, performed x 5 repetitions  -RS       Row Name 23 1248          Gait/Stairs (Locomotion)    [place] Level (Gait) contact guard  -RS     Distance in Feet (Gait) 40 feet, no AD  -RS     Deviations/Abnormal Patterns (Gait) stride length  decreased;gait speed decreased  -RS     Bilateral Gait Deviations forward flexed posture  -RS               User Key  (r) = Recorded By, (t) = Taken By, (c) = Cosigned By      Initials Name Provider Type    RS Robyn Blanco PT Physical Therapist                   Obj/Interventions       Row Name 09/09/23 1249          Range of Motion Comprehensive    General Range of Motion no range of motion deficits identified  -RS       [facility] Name 09/09/23 1249          Strength Comprehensive (MMT)    Comment, General Manual Muscle Testing (MMT) Assessment LLE 4+/5  -RS       Row Name 09/09/23 1249          Motor Skills    Therapeutic Exercise --  pt performed standing balance activities at bedside, tandem stance, romberg EC, SLS x 20-30 sec ea, followed by 5x STS, required CGA for safety with balance  -RS               User Key  (r) = Recorded By, (t) = Taken By, (c) = Cosigned By      Initials Name Provider Type    RS Robyn Blanco PT Physical Therapist                   Goals/Plan    No documentation.                  Clinical Impression       [facility] Name 09/09/23 1250          Pain    Pretreatment Pain Rating 0/10 - no pain  -RS       Row Name 09/09/23 1250          Plan of Care Review    Plan of Care Reviewed With patient  -RS     Progress improving  -RS     Outcome Evaluation Pt with marked improvement in functional strength and mobility this date. He ambulates without AD and performs sit to stand transfer initially with CGA however able to progress to SBA with repetition. No overt LOB noted with ambulation. Performed standing balance challenges at bedside including tandem stance, static stance with eyes closed, and single limb stance all with good tolerance.  -RS               User Key  (r) = Recorded By, (t) = Taken By, (c) = Cosigned By      Initials Name Provider Type    Robyn Schulz PT Physical Therapist                   Outcome Measures       Row Name 09/09/23 1251 09/09/23 0800       How much help from  another person do you currently need...    Turning from your back to your side while in flat bed without using bedrails? 4  -RS 3  -MM    Moving from lying on back to sitting on the side of a flat bed without bedrails? 4  -RS 3  -MM    Moving to and from a bed to a chair (including a wheelchair)? 4  -RS 3  -MM    Standing up from a chair using your arms (e.g., wheelchair, bedside chair)? 4  -RS 3  -MM    Climbing 3-5 steps with a railing? 3  -RS 1  -MM    To walk in hospital room? 3  -RS 2  -MM    AM-PAC 6 Clicks Score (PT) 22  -RS 15  -MM    Highest level of mobility 7 --> Walked 25 feet or more  -RS 4 --> Transferred to chair/commode  -MM      Row Name 09/09/23 1251          Functional Assessment    Outcome Measure Options AM-PAC 6 Clicks Basic Mobility (PT)  -RS               User Key  (r) = Recorded By, (t) = Taken By, (c) = Cosigned By      Initials Name Provider Type    RS Robyn Blanco, PT Physical Therapist    MM Lindsay Cohen, RN Registered Nurse                                 Physical Therapy Education       Title: PT OT SLP Therapies (In Progress)       Topic: Physical Therapy (Done)       Point: Mobility training (Done)       Learning Progress Summary             Patient Acceptance, E, VU by RS at 9/9/2023 1252    Acceptance, E, VU,NR by SV at 9/8/2023 1229    Acceptance, E, NR by AR at 9/7/2023 1041    Acceptance, E,D, DU by PC at 9/6/2023 1617                         Point: Home exercise program (Done)       Learning Progress Summary             Patient Acceptance, E, VU by RS at 9/9/2023 1252    Acceptance, E, VU,NR by SV at 9/8/2023 1229    Acceptance, E, NR by AR at 9/7/2023 1041    Acceptance, E,D, DU by PC at 9/6/2023 1617                         Point: Body mechanics (Done)       Learning Progress Summary             Patient Acceptance, E, VU by RS at 9/9/2023 1252    Acceptance, E, NR by AR at 9/7/2023 1041    Acceptance, E,D, DU by PC at 9/6/2023 1617                         Point:  Precautions (Done)       Learning Progress Summary             Patient Acceptance, E, VU by RS at 9/9/2023 1252    Acceptance, E, NR by AR at 9/7/2023 1041    Acceptance, E,D, DU by PC at 9/6/2023 1617                                         User Key       Initials Effective Dates Name Provider Type Discipline    PC 06/16/21 -  Lisa Leung, PT Physical Therapist PT    AR 06/16/21 -  Rosemary Roque, PT Physical Therapist PT    SV 07/11/23 -  Ela Condon, PT Physical Therapist PT    RS 06/16/21 -  Robyn Blanco, PT Physical Therapist PT                  PT Recommendation and Plan     Plan of Care Reviewed With: patient  Progress: improving  Outcome Evaluation: Pt with marked improvement in functional strength and mobility this date. He ambulates without AD and performs sit to stand transfer initially with CGA however able to progress to SBA with repetition. No overt LOB noted with ambulation. Performed standing balance challenges at bedside including tandem stance, static stance with eyes closed, and single limb stance all with good tolerance.     Time Calculation:         PT Charges       Row Name 09/09/23 1252             Time Calculation    Start Time 1147  -RS      Stop Time 1157  -RS      Time Calculation (min) 10 min  -RS      PT - Next Appointment 09/11/23  -RS         Time Calculation- PT    Total Timed Code Minutes- PT 9 minute(s)  -RS         Timed Charges    22436 - PT Therapeutic Exercise Minutes 4  -RS      93543 - PT Therapeutic Activity Minutes 5  -RS      11187 - PT Initial Prosthetic Encounter --  -RS         Total Minutes    Timed Charges Total Minutes 9  -RS       Total Minutes 9  -RS                User Key  (r) = Recorded By, (t) = Taken By, (c) = Cosigned By      Initials Name Provider Type    RS Robyn Blanco, PT Physical Therapist                      PT G-Codes  Outcome Measure Options: AM-PAC 6 Clicks Basic Mobility (PT)  AM-PAC 6 Clicks Score (PT): 22  AM-PAC 6 Clicks  Score (OT): 14  Modified [place] Scale: 4 - Moderately severe disability.  Unable to walk without assistance, and unable to attend to own bodily needs without assistance.  PT Discharge Summary  Anticipated Discharge Disposition (PT): home with assist, home with outpatient therapy services    Robyn Blanco, PT  9/9/2023

## 2023-09-09 NOTE — PROGRESS NOTES
Name: Heriberto Fields ADMIT: 2023   : 1965  PCP: Courtney Croft APRN    MRN: [id number] LOS: 5 days   AGE/SEX: 58 y.o. male  ROOM: [facility]     Subjective   Subjective   Chief Complaint   Patient presents with    Extremity Weakness     Reported feeling better.  Having some improvement in left side. No CP SOA NVD.     Objective   Objective   Vital Signs  Temp:  [97.7 °F (36.5 °C)-98.4 °F (36.9 °C)] 98.2 °F (36.8 °C)  Heart Rate:  [] 84  Resp:  [16-18] 18  BP: (120-135)/(84-92) 120/84  SpO2:  [95 %-98 %] 95 %  on   ;      Body mass index is 31.44 kg/m².    Physical Exam  Vitals and nursing note reviewed.   Constitutional:       General: He is not in acute distress.     Appearance: He is not diaphoretic.   Eyes:      General:         Right eye: No discharge.         Left eye: No discharge.      Conjunctiva/sclera: Conjunctivae normal.   Cardiovascular:      Rate and Rhythm: Normal rate and regular rhythm.      Pulses: Normal pulses.   Pulmonary:      Effort: Pulmonary effort is normal.      Breath sounds: No wheezing.   Abdominal:      General: There is no distension.      Palpations: Abdomen is soft.      Tenderness: There is no abdominal tenderness. There is no guarding or rebound.   Musculoskeletal:         General: No swelling or tenderness.   Skin:     General: Skin is warm and dry.   Neurological:      Mental Status: He is alert.      Cranial Nerves: No cranial nerve deficit.      Sensory: Sensory deficit present.      Motor: Weakness (LLE) present.   Psychiatric:         Mood and Affect: Mood normal.         Behavior: Behavior normal.     Results Review  I reviewed the patient's new clinical results.    Results from last 7 days   Lab Units 23  0544 23  0344 23  2102   WBC 10*3/mm3 8.98 5.89 10.26   HEMOGLOBIN g/dL 15.8 13.7 16.3   PLATELETS 10*3/mm3 218 221 300       Results from last 7 days   Lab Units 23  0544 23  2223 23  0344  09/04/23  2102   SODIUM mmol/L 139  --  141 142   POTASSIUM mmol/L 4.0 4.4 3.4* 3.2*   CHLORIDE mmol/L 103  --  106 105   CO2 mmol/L 25.0  --  21.9* 18.6*   BUN mg/dL 13  --  5* 8   CREATININE mg/dL 0.91  --  0.89 1.14   GLUCOSE mg/dL 106*  --  131* 241*   EGFR mL/min/1.73 96.5  --  98.1 73.6       Results from last 7 days   Lab Units 09/04/23  2102   ALBUMIN g/dL 4.5   BILIRUBIN mg/dL 0.3   ALK PHOS U/L 73   AST (SGOT) U/L 25   ALT (SGPT) U/L 34       Results from last 7 days   Lab Units 09/09/23  0544 09/05/23  0344 09/04/23 2102   CALCIUM mg/dL 9.1 8.1* 9.0   ALBUMIN g/dL  --   --  4.5   MAGNESIUM mg/dL 2.2  --   --            No results found for: HGBA1C, POCGLU      IR LUMBAR PUNCTURE DIAGNOSTIC    Result Date: 9/7/2023  Technically successful fluoroscopically guided lumbar puncture.  Reference air kerma: 12.2 mGy  This report was finalized on 9/7/2023 11:34 AM by Dr. Benigno Carr M.D.       I have personally reviewed all medications:  Scheduled Medications  atorvastatin, 80 mg, Oral, Nightly  docusate sodium, 100 mg, Oral, Daily  fluticasone, 2 spray, Each Nare, Daily  methocarbamol, 1,000 mg, Oral, 4x Daily  senna-docusate sodium, 2 tablet, Oral, BID  sodium chloride, 10 mL, Intravenous, Q12H      Infusions  hold, 1 each      Diet  Diet: Regular/House Diet; Texture: Regular Texture (IDDSI 7); Fluid Consistency: Thin (IDDSI 0)    I have personally reviewed:  [x]  Laboratory   [x]  Microbiology   []  Radiology   []  EKG/Telemetry  []  Cardiology/Vascular   []  Pathology    []  Records       Assessment/Plan     Active Hospital Problems    Diagnosis  POA    **Stroke [I63.9]  Yes    HLD (hyperlipidemia) [E78.5]  Yes    Left arm weakness [R29.898]  Yes      Resolved Hospital Problems   No resolved problems to display.       58 y.o. male admitted with Stroke.    Left-sided weakness/headache/history of stroke: Received TNK on admission.  Demyelinating process versus stroke work-up was negative.  LP with negative  Cx and cytology.  Potential hemiplegic migraine or functional or neurologic disorder.  Neurology evaluated and plan outpatient follow-up.  Hyperlipidemia: Statin  Hypokalemia: Improved.  Check magnesium level  PPX: SCD  Disposition: SNF/pending placement    Expected Discharge Date: 9/11/2023; Expected Discharge Time:      Matteo Rodriguez MD  [facility]  09/09/23  09:58 EDT    Dictated portions of note using Dragon dictation software.  Copied text in this note has been reviewed by me and remains accurate as of 09/09/23

## 2023-09-09 NOTE — PLAN OF CARE
Goal Outcome Evaluation:  Plan of Care Reviewed With: patient        Progress: improving  Outcome Evaluation: Pt with marked improvement in functional strength and mobility this date. He ambulates without AD and performs sit to stand transfer initially with CGA however able to progress to SBA with repetition. No overt LOB noted with ambulation. Performed standing balance challenges at bedside including tandem stance, static stance with eyes closed, and single limb stance all with good tolerance.      Anticipated Discharge Disposition (PT): home with assist, home with outpatient therapy services

## 2023-09-10 ENCOUNTER — READMISSION MANAGEMENT (OUTPATIENT)
Dept: CALL CENTER | Facility: HOSPITAL | Age: 58
End: 2023-09-10
Payer: MEDICAID

## 2023-09-10 VITALS
HEART RATE: 102 BPM | TEMPERATURE: 99.1 F | BODY MASS INDEX: 31.45 KG/M2 | WEIGHT: 177.47 LBS | RESPIRATION RATE: 18 BRPM | OXYGEN SATURATION: 99 % | HEIGHT: 63 IN | DIASTOLIC BLOOD PRESSURE: 92 MMHG | SYSTOLIC BLOOD PRESSURE: 142 MMHG

## 2023-09-10 LAB
AQP4 H2O CHANNEL IGG CSF QL: NORMAL
BACTERIA SPEC AEROBE CULT: NORMAL
GRAM STN SPEC: NORMAL
GRAM STN SPEC: NORMAL

## 2023-09-10 PROCEDURE — 97535 SELF CARE MNGMENT TRAINING: CPT

## 2023-09-10 RX ADMIN — METHOCARBAMOL TABLETS 1000 MG: 500 TABLET, COATED ORAL at 12:40

## 2023-09-10 RX ADMIN — METHOCARBAMOL TABLETS 1000 MG: 500 TABLET, COATED ORAL at 08:05

## 2023-09-10 RX ADMIN — Medication 10 ML: at 08:06

## 2023-09-10 RX ADMIN — FLUTICASONE PROPIONATE 2 SPRAY: 50 SPRAY, METERED NASAL at 08:06

## 2023-09-10 NOTE — DISCHARGE SUMMARY
Date of Admission: 9/4/2023  Date of Discharge:  9/10/2023  Primary Care Physician: Courtney Croft APRN     Discharge Diagnosis:  Active Hospital Problems    Diagnosis  POA    HLD (hyperlipidemia) [E78.5]  Yes    Left arm weakness [R29.898]  Yes      Resolved Hospital Problems   No resolved problems to display.       Presenting Problem/History of Present Illness from H&P:  Hypokalemia [E87.6]  Facial droop [R29.810]  Hyperglycemia [R73.9]  Stroke [I63.9]  History of stroke [Z86.73]  Left arm weakness [R29.898]     58-year-old male presents with complaints of left-sided weakness that started about 30 minutes prior to coming to the emergency room. Has had a previous stroke with left-sided weakness of his left arm related to that event. Chronic hypertension. Complained of severe headache. Evaluated in the emergency room and stroke service elected to treat with TNK. Will require admission to intensive care post TNK for acute monitoring. Mild improvement in left-sided weakness noted. He was also having some previous double vision.     Hospital Course:  The patient is a 58 y.o. male who presented with left-sided weakness headache and history of stroke.  He was admitted and received TNK on admission.  He was monitored in the ICU and was able to transfer to the floor.  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  He did not have acute infarct on the MRI.  He is going to follow-up with neurology as an outpatient and has had improvement in his weakness.  Physical therapy recommended continued outpatient services so I will place those referrals.    Exam Today:  Constitutional:       General: He is not in acute distress.     Appearance: He is not diaphoretic.   Eyes:      General:         Right eye: No discharge.         Left eye: No discharge.      Conjunctiva/sclera: Conjunctivae normal.   Cardiovascular:      Rate and Rhythm: Normal rate and regular  rhythm.      Pulses: Normal pulses.   Pulmonary:      Effort: Pulmonary effort is normal.      Breath sounds: No wheezing.   Abdominal:      General: There is no distension.      Palpations: Abdomen is soft.      Tenderness: There is no abdominal tenderness. There is no guarding or rebound.   Musculoskeletal:         General: No swelling or tenderness.   Skin:     General: Skin is warm and dry.   Neurological:      Mental Status: He is alert.      Cranial Nerves: No cranial nerve deficit.      Sensory: Sensory deficit present.      Motor: Weakness (LLE) present.   Psychiatric:         Mood and Affect: Mood normal.         Behavior: Behavior normal.     Results:  Final Diagnosis    Cerebrospinal Fluid (CSF), Lumbar Puncture:  A.  Low cellularity sample consisting of occasional lymphocytes, neutrophils and monocytes.     CT Perfusion/CTA Head and Neck  1. No acute intracranial findings.  2. No cervical vascular stenosis or occlusion.  3. No intracranial stenosis or occlusion.  4. No areas of cerebral blood flow less than 30% or Tmax greater than 6  seconds are seen.    CXR  Bibasilar atelectasis.     CT Head  No acute intracranial findings.     CT Head  No acute intracranial findings.     MRI Brain  No acute infarct. Mild nonspecific periventricular white matter changes.     MRI Brain with Contrast  There are multiple small nodular foci of FLAIR and T2 white matter  hyperintensity within the relatively peripheral white matter of the left  occipital lobe, left parietal lobe, and both frontal lobes that are  noted in a pattern most suggestive of mild changes of chronic small  vessel ischemic phenomenon. There is no convincing evidence to suggest  foci of demyelination. Also, in specific, these nodular foci of white  matter signal abnormality do not satisfy the 2017 revised [place]  criteria for dissemination in space.     Incidental note is made of small chronic infarcts within the cerebellar  hemispheres within the  PICA distributions.    MRI Cervical Spine  There is no evidence to suggest a demyelinating process within the  cervical spine.     Prominent degenerative disc changes are seen at C5-6 and C6-7 with  adjacent degenerative endplate marrow changes. Disc osteophyte complexes  at C5-6 and C6-7 result in mild degrees of canal narrowing. These are  the levels in the most prominent canal stenosis.     Multilevel foraminal narrowing is also appreciated within the cervical  spine and most prominently identified at the C3-4, C5-6, and C6-7  levels. These foraminal stenotic changes are as discussed in detail  above.     Prominent left facet arthritic changes are seen within the left C3-4  facet joint.    TTE    Left ventricular systolic function is normal. Left ventricular ejection fraction appears to be 61 - 65%.    Left ventricular diastolic function was normal.    The right ventricular cavity is borderline dilated. Normal right ventricular systolic function noted.    Saline test results are negative.    Insufficient TR velocity profile to estimate the right ventricular systolic pressure.    There is no evidence of pericardial effusion.    Procedures Performed:         Consults:   Consults       Date and Time Order Name Status Description    9/6/2023 10:43 AM Inpatient Internal Medicine Consult Completed     9/4/2023 10:52 PM Inpatient Neurology Consult Stroke Completed     9/4/2023 10:43 PM Pulmonology (on-call MD unless specified)      9/4/2023  9:01 PM Inpatient Neurology Consult Stroke Completed     9/4/2023  9:01 PM Inpatient Neurology Consult Stroke Completed              Discharge Disposition:  Home or Self Care    Discharge Medications:     Discharge Medications        Continue These Medications        Instructions Start Date   atorvastatin 80 MG tablet  Commonly known as: LIPITOR   80 mg, Oral, Nightly      budesonide 32 MCG/ACT nasal spray  Commonly known as: RINOCORT AQUA   1 spray, Nasal, Daily      diclofenac 75  MG EC tablet  Commonly known as: VOLTAREN   75 mg, Oral, 2 Times Daily      diphenhydrAMINE 25 MG tablet  Commonly known as: BENADRYL   25 mg, Oral, Every 6 Hours PRN, Take until complete resolve the rash.      famotidine 20 MG tablet  Commonly known as: PEPCID   20 mg, Oral, 2 Times Daily, Take until complete resolve the rash.      levocetirizine 5 MG tablet  Commonly known as: XYZAL   5 mg, Oral, Daily      losartan 25 MG tablet  Commonly known as: COZAAR   25 mg, Oral, Daily      methocarbamol 500 MG tablet  Commonly known as: ROBAXIN   1,000 mg, Oral, 4 Times Daily      vitamin D 1.25 MG (35776 UT) capsule capsule  Commonly known as: ERGOCALCIFEROL   50,000 Units, Oral, Every 7 Days               Discharge Diet:   Diet Instructions       Advance Diet As Tolerated -Target Diet: Home diet      Target Diet: Home diet            Activity at Discharge:   Activity Instructions       Activity as Tolerated              Follow-up Appointments:  Additional Instructions for the Follow-ups that You Need to Schedule       Ambulatory Referral to Physical Therapy Evaluate and treat   As directed      Specialty needed: Evaluate and treat   Follow-up needed: Yes        Referral to Occupational Therapy   As directed      Follow-up needed: Yes               Follow-up Information       Courtney Croft APRN .    Specialty: Nurse Practitioner  Contact information:  [address]  [facility] [address]  [facility] [id number] [phone]               Kirt Del Toro II, MD Follow up.    Specialty: Neurology  Contact information:  [address] [facility] [address]  [facility] [address]  [phone]                             Test Results Pending at Discharge:  Pending Labs       Order Current Status    Lyme, Western Blot, CSF - Cerebrospinal Fluid, Lumbar Puncture In process    MOG Antibody w Reflex to Titer, CSF - Cerebrospinal Fluid, In process    MS Profile & MBP, CSF - Cerebrospinal Fluid, Lumbar Puncture In process    NMO/AQP4-IgG, CSF Rflx -  Cerebrospinal Fluid, Lumbar Puncture In process             Matteo Rodriguez MD  09/10/23  11:32 EDT    Time Spent on Discharge Activities: >30 minutes    Dictated portions using Dragon dictation software.

## 2023-09-10 NOTE — PLAN OF CARE
Problem: Fall Injury Risk  Goal: Absence of Fall and Fall-Related Injury  Outcome: Ongoing, Progressing  Intervention: Identify and Manage Contributors  Recent Flowsheet Documentation  Taken 9/10/2023 0200 by Rain Shaffer RN  Medication Review/Management: medications reviewed  Taken 9/9/2023 2200 by Rain Shaffer RN  Medication Review/Management: medications reviewed  Taken 9/9/2023 2000 by Rain Shaffer RN  Medication Review/Management: medications reviewed  Intervention: Promote Injury-Free Environment  Recent Flowsheet Documentation  Taken 9/10/2023 0200 by Rain Shaffer RN  Safety Promotion/Fall Prevention: safety round/check completed  Taken 9/10/2023 0000 by Rain Shaffer RN  Safety Promotion/Fall Prevention: safety round/check completed  Taken 9/9/2023 2200 by Rain Shaffer RN  Safety Promotion/Fall Prevention: safety round/check completed  Taken 9/9/2023 2000 by Rain Shaffer RN  Safety Promotion/Fall Prevention: safety round/check completed     Problem: Adjustment to Illness (Stroke, Ischemic/Transient Ischemic Attack)  Goal: Optimal Coping  Outcome: Ongoing, Progressing  Intervention: Support Psychosocial Response to Stroke  Recent Flowsheet Documentation  Taken 9/9/2023 2017 by Rain Shaffer RN  Family/Support System Care: support provided     Problem: Bowel Elimination Impaired (Stroke, Ischemic/Transient Ischemic Attack)  Goal: Effective Bowel Elimination  Outcome: Ongoing, Progressing     Problem: Bowel Elimination Impaired (Stroke, Ischemic/Transient Ischemic Attack)  Goal: Effective Bowel Elimination  Outcome: Ongoing, Progressing     Problem: Cerebral Tissue Perfusion (Stroke, Ischemic/Transient Ischemic Attack)  Goal: Optimal Cerebral Tissue Perfusion  Outcome: Ongoing, Progressing     Problem: Communication Impairment (Stroke, Ischemic/Transient Ischemic Attack)  Goal: Improved Communication Skills  Outcome: Ongoing, Progressing     Problem: Skin  Injury Risk Increased  Goal: Skin Health and Integrity  Outcome: Ongoing, Progressing     Problem: Adult Inpatient Plan of Care  Goal: Plan of Care Review  Outcome: Ongoing, Progressing  Flowsheets (Taken 9/10/2023 0310)  Progress: improving   Goal Outcome Evaluation:           Progress: improving

## 2023-09-10 NOTE — THERAPY TREATMENT NOTE
Patient Name: Heriberto Fields  : 1965    MRN: [id number]                              Today's Date: 9/10/2023       Admit Date: 2023    Visit Dx:     ICD-10-CM ICD-9-CM   1. Left arm weakness  R29.898 729.89   2. Hyperglycemia  R73.9 790.29   3. Hypokalemia  E87.6 276.8   4. Facial droop  R29.810 781.94   5. History of stroke  Z86.73 V12.54     Patient Active Problem List   Diagnosis    Stroke    Left arm weakness    HLD (hyperlipidemia)     Past Medical History:   Diagnosis Date    GERD (gastroesophageal reflux disease)     Stroke      History reviewed. No pertinent surgical history.   General Information       Row Name 09/10/23 1154          OT Time and Intention    Document Type therapy note (daily note)  -SARWAT     Mode of Treatment individual therapy;occupational therapy  -       Row Name 09/10/23 1154          General Information    Existing Precautions/Restrictions no known precautions/restrictions  -       Row Name 09/10/23 1154          Cognition    Orientation Status (Cognition) oriented x 4  -       Row Name 09/10/23 1154          Safety Issues, Functional Mobility    Comment, Safety Issues/Impairments (Mobility) no skid socks worn.  -LE               User Key  (r) = Recorded By, (t) = Taken By, (c) = Cosigned By      Initials Name Provider Type    Yenny Babb OTR Occupational Therapist                     Mobility/ADL's       Row Name 09/10/23 1155          Bed Mobility    Supine-Sit [place] (Bed Mobility) independent  -       Row Name 09/10/23 1155          Transfers    Transfers bed-chair transfer;sit-stand transfer;stand-sit transfer;toilet transfer  -SARWAT       Row Name 09/10/23 1155          Bed-Chair Transfer    Bed-Chair [place] (Transfers) independent  -       Row Name 09/10/23 1155          Sit-Stand Transfer    Sit-Stand [place] (Transfers) independent  -       Row Name 09/10/23 1155          Stand-Sit Transfer    Stand-Sit [place]  (Transfers) independent  -LE       Row Name 09/10/23 1155          Toilet Transfer    [place] (Toilet Transfer) independent  -LE       Row Name 09/10/23 1155          Functional Mobility    Functional Mobility- Ind. Level independent  -     Functional Mobility- Comment bed to bathroom to toilet to sink to chair.  per RN notes last night pt was up making his bed and getting dressed without assist.  -LE       Row Name 09/10/23 1155          Activities of Daily Living    BADL Assessment/Intervention toileting;feeding;grooming;lower body dressing;upper body dressing;bathing  -[facility] 09/10/23 1155          Grooming Assessment/Training    [place] (Grooming) oral care regimen;wash face, hands;independent  -LE     Position (Grooming) sink side;unsupported standing  -LE     Comment, (Grooming) able to apply paste and using L hand as non dominant assist without difficulty.  -LE       Row Name 09/10/23 1155          Self-Feeding Assessment/Training    Comment, (Feeding) deneis difficulty.  -LE       Row Name 09/10/23 1155          Lower Body Dressing Assessment/Training    [place] (Lower Body Dressing) doff;don;socks;independent  -LE     Position (Lower Body Dressing) edge of bed sitting  -LE               User Key  (r) = Recorded By, (t) = Taken By, (c) = Cosigned By      Initials Name Provider Type    Yenny Babb OTR Occupational Therapist                   Obj/Interventions       Row Name 09/10/23 1157          Sensory Assessment (Somatosensory)    Sensory Assessment denies numb/tingle L UE.  No overt intattention to L noted during ADL today.  -LE       Row Name 09/10/23 1157          Vision Assessment/Intervention    Vision Assessment Comment denies blurred or double vision.  -LE       Row Name 09/10/23 1157          Range of Motion Comprehensive    Comment, General Range of Motion raises B UE easily and equally above head, bring hands to mouth and oppose B thumbs to each  "fingertip.  -       Row Name 09/10/23 1157          Strength Comprehensive (MMT)    Comment, General Manual Muscle Testing (MMT) Assessment B Ue 4+5.  B  good and equal.  -       Row Name 09/10/23 1157          Balance    Comment, Balance no overt LOB or unsteadiness noted.  -               User Key  (r) = Recorded By, (t) = Taken By, (c) = Cosigned By      Initials Name Provider Type    Yenny Babb OTGURMEET Occupational Therapist                   Goals/Plan       Row Name 09/10/23 1154          Bed Mobility Goal 1 (OT)    Progress/Outcomes (Bed Mobility Goal 1, OT) goal met  -       Row Name 09/10/23 1154          Transfer Goal 1 (OT)    Progress/Outcome (Transfer Goal 1, OT) goal met  -[facility] Name 09/10/23 1154          Bathing Goal 1 (OT)    Progress/Outcomes (Bathing Goal 1, OT) goal met  -[facility] Name 09/10/23 1154          Dressing Goal 1 (OT)    Progress/Outcome (Dressing Goal 1, OT) goal met  -[facility] Name 09/10/23 1154          Grooming Goal 1 (OT)    Progress/Outcome (Grooming Goal 1, OT) goal met  -[facility] Name 09/10/23 1154          Self-Feeding Goal 1 (OT)    Progress/Outcomes (Self-Feeding Goal 1, OT) goal met  -[facility] Name 09/10/23 1154          Strength Goal 1 (OT)    Progress/Outcome (Strength Goal 1, OT) goal met  -               User Key  (r) = Recorded By, (t) = Taken By, (c) = Cosigned By      Initials Name Provider Type    Yenny Babb, OTGURMEET Occupational Therapist                   Clinical Impression       Row Name 09/10/23 1150          Pain Assessment    Pretreatment Pain Rating 0/10 - no pain  -       Row Name 09/10/23 1150          Plan of Care Review    Plan of Care Reviewed With patient  -LE     Progress improving  -LE     Outcome Evaluation Asked to see pt today due to improved status and plan to d/c home today. Pt presents in bed.  Reports feels \"like superman\" today and is able to move OOB, ambulate to bathroom, brush teeth, shauna/doff " socks and sit up in chair with no AD and no LOB or unsteadiness.  B UE are 7/8 AROM, 4+/5 strength and no overt impaired coordination  during ADL task or with assessment. Pt denies numb/tingle/weakness L UE.   Pt denies concerns for return home today.  Discuss pt with RN before and after session.  -       Row Name 09/10/23 1150          Therapy Plan Review/Discharge Plan (OT)    Anticipated Discharge Disposition (OT) home  -       Row Name 09/10/23 1150          Vital Signs    O2 Delivery Pre Treatment room air  -LE     Pre Patient Position Supine  -LE     Intra Patient Position Standing  -LE     Post Patient Position Sitting  -LE       Row Name 09/10/23 1150          Positioning and Restraints    Pre-Treatment Position in bed  -LE     Post Treatment Position chair  -LE     In Chair notified nsg;sitting;call light within reach;encouraged to call for assist;exit alarm on  -LE               User Key  (r) = Recorded By, (t) = Taken By, (c) = Cosigned By      Initials Name Provider Type    Yenny Babb OTR Occupational Therapist                   Outcome Measures       Row Name 09/10/23 1159          How much help from another is currently needed...    Putting on and taking off regular lower body clothing? 4  -LE     Bathing (including washing, rinsing, and drying) 4  -LE     Toileting (which includes using toilet bed pan or urinal) 4  -LE     Putting on and taking off regular upper body clothing 4  -LE     Taking care of personal grooming (such as brushing teeth) 4  -LE     Eating meals 4  -LE     AM-PAC 6 Clicks Score (OT) 24  -LE       Row Name 09/10/23 0807          How much help from another person do you currently need...    Turning from your back to your side while in flat bed without using bedrails? 4  -MM     Moving from lying on back to sitting on the side of a flat bed without bedrails? 4  -MM     Moving to and from a bed to a chair (including a wheelchair)? 4  -MM     Standing up from a chair using  your arms (e.g., wheelchair, bedside chair)? 4  -MM     Climbing 3-5 steps with a railing? 4  -MM     To walk in hospital room? 4  -MM     AM-PAC 6 Clicks Score (PT) 24  -MM     Highest level of mobility 8 --> Walked 250 feet or more  -MM       Row Name 09/10/23 1159          Functional Assessment    Outcome Measure Options AM-PAC 6 Clicks Daily Activity (OT)  -LE               User Key  (r) = Recorded By, (t) = Taken By, (c) = Cosigned By      Initials Name Provider Type    Yenny Babb OTR Occupational Therapist    Lindsay Narvaez, RN Registered Nurse                    Occupational Therapy Education       Title: PT OT SLP Therapies (In Progress)       Topic: Occupational Therapy (Not Started)       Point: ADL training (Not Started)       Description:   Instruct learner(s) on proper safety adaptation and remediation techniques during self care or transfers.   Instruct in proper use of assistive devices.                  Learner Progress:  Not documented in this visit.              Point: Home exercise program (Not Started)       Description:   Instruct learner(s) on appropriate technique for monitoring, assisting and/or progressing therapeutic exercises/activities.                  Learner Progress:  Not documented in this visit.              Point: Precautions (Not Started)       Description:   Instruct learner(s) on prescribed precautions during self-care and functional transfers.                  Learner Progress:  Not documented in this visit.              Point: Body mechanics (Not Started)       Description:   Instruct learner(s) on proper positioning and spine alignment during self-care, functional mobility activities and/or exercises.                  Learner Progress:  Not documented in this visit.                                  OT Recommendation and Plan     Plan of Care Review  Plan of Care Reviewed With: patient  Progress: improving  Outcome Evaluation: Asked to see pt today due to improved  "status and plan to d/c home today. Pt presents in bed.  Reports feels \"like superman\" today and is able to move OOB, ambulate to bathroom, brush teeth, shauna/doff socks and sit up in chair with no AD and no LOB or unsteadiness.  B UE are 7/8 AROM, 4+/5 strength and no overt impaired coordination  during ADL task or with assessment. Pt denies numb/tingle/weakness L UE.   Pt denies concerns for return home today.  Discuss pt with RN before and after session.     Time Calculation:         Time Calculation- OT       Row Name 09/10/23 1159             Time Calculation- OT    OT Start Time 1130  -LE      OT Stop Time 1138  -LE      OT Time Calculation (min) 8 min  -LE      Total Timed Code Minutes- OT 8 minute(s)  -LE      OT Received On 09/10/23  -LE      OT - Next Appointment 09/12/23  -LE         Timed Charges    92056 - OT Self Care/Mgmt Minutes 8  -LE         Total Minutes    Timed Charges Total Minutes 8  -LE       Total Minutes 8  -LE                User Key  (r) = Recorded By, (t) = Taken By, (c) = Cosigned By      Initials Name Provider Type    Yenny Babb OTR Occupational Therapist                  Therapy Charges for Today       Code Description Service Date Service Provider Modifiers Qty    [id number]  OT SELF CARE/MGMT/TRAIN EA 15 MIN 9/10/2023 Yenny Coleman OTR GO 1                 BART Hickey  9/10/2023  "

## 2023-09-10 NOTE — PLAN OF CARE
Goal Outcome Evaluation:  Plan of Care Reviewed With: patient        Progress: improving       Patient discharged home

## 2023-09-10 NOTE — PLAN OF CARE
"Goal Outcome Evaluation:  Plan of Care Reviewed With: patient        Progress: improving  Outcome Evaluation: Asked to see pt today due to improved status and plan to d/c home today. Pt presents in bed.  Reports feels \"like superman\" today and is able to move OOB, ambulate to bathroom, brush teeth, shauna/doff socks and sit up in chair with no AD and no LOB or unsteadiness.  B UE are 7/8 AROM, 4+/5 strength and no overt impaired coordination  during ADL task or with assessment. Pt denies numb/tingle/weakness L UE.   Pt denies concerns for return home today.  Discuss pt with RN before and after session.      Anticipated Discharge Disposition (OT): home  "

## 2023-09-10 NOTE — OUTREACH NOTE
Prep Survey      Flowsheet Row Responses   [facility] facility patient discharged from? [facility]   Is LACE score < 7 ? No   Eligibility Not Eligible   What are the reasons patient is not eligible? [facility]  [[facility] ]   Does the patient have one of the following disease processes/diagnoses(primary or secondary)? Other   Prep survey completed? Yes            AMADA PRICE - Registered Nurse

## 2023-09-10 NOTE — NURSING NOTE
Patient called nurse, stating he wants to talk to . States he wants to go home tomorrow morning and that he is not staying any longer.     Nurse also reminded patient several times regarding visitor hours and policy as he had 3 people in room at this time.

## 2023-09-11 ENCOUNTER — TELEPHONE (OUTPATIENT)
Dept: NEUROLOGY | Facility: CLINIC | Age: 58
End: 2023-09-11
Payer: MEDICAID

## 2023-09-11 NOTE — PROGRESS NOTES
Case Management Discharge Note      Final Note: Pt discharged home with family assist and outpatient therapy orders.         Selected Continued Care - Discharged on 9/10/2023 Admission date: 9/4/2023 - Discharge disposition: Home or Self Care      Destination    No services have been selected for the patient.                Durable Medical Equipment    No services have been selected for the patient.                Dialysis/Infusion    No services have been selected for the patient.                Home Medical Care    No services have been selected for the patient.                Therapy    No services have been selected for the patient.                Community Resources    No services have been selected for the patient.                Community & DME    No services have been selected for the patient.                    Transportation Services  Private: Car    Final Discharge Disposition Code: 01 - home or self-care

## 2023-09-14 LAB
ALB CSF/SERPL: 9 {RATIO} (ref 0–8)
ALBUMIN CSF-MCNC: 33 MG/DL (ref 15–55)
ALBUMIN SERPL-MCNC: 3.7 G/DL (ref 3.8–4.9)
B BURGDOR IGG PATRN CSF IB-IMP: NEGATIVE
B BURGDOR IGM PATRN CSF IB-IMP: NEGATIVE
B BURGDOR18KD IGG CSF QL IB: ABNORMAL
B BURGDOR23KD IGG CSF QL IB: ABNORMAL
B BURGDOR23KD IGM CSF QL IB: ABNORMAL
B BURGDOR28KD IGG CSF QL IB: ABNORMAL
B BURGDOR30KD IGG CSF QL IB: ABNORMAL
B BURGDOR39KD IGG CSF QL IB: ABNORMAL
B BURGDOR39KD IGM CSF QL IB: ABNORMAL
B BURGDOR41KD IGG CSF QL IB: PRESENT
B BURGDOR41KD IGM CSF QL IB: ABNORMAL
B BURGDOR45KD IGG CSF QL IB: ABNORMAL
B BURGDOR58KD IGG CSF QL IB: ABNORMAL
B BURGDOR66KD IGG CSF QL IB: ABNORMAL
B BURGDOR93KD IGG CSF QL IB: ABNORMAL
IGG CSF-MCNC: 3.9 MG/DL (ref 0–10.3)
IGG SERPL-MCNC: 1010 MG/DL (ref 603–1613)
IGG SYNTH RATE SER+CSF CALC-MRATE: -4.1 MG/DAY
IGG/ALB CLEAR SER+CSF-RTO: 0.4 (ref 0–0.7)
IGG/ALB CSF: 0.12 {RATIO} (ref 0–0.25)
MBP CSF-MCNC: 3.3 NG/ML (ref 0–4.7)
OLIGOCLONAL BANDS.IT SER+CSF QL: ABNORMAL

## 2023-09-19 LAB — CREAT BLDA-MCNC: 1.2 MG/DL (ref 0.6–1.3)

## 2023-09-19 PROCEDURE — 99284 EMERGENCY DEPT VISIT MOD MDM: CPT

## 2023-09-20 ENCOUNTER — HOSPITAL ENCOUNTER (OUTPATIENT)
Facility: HOSPITAL | Age: 58
Setting detail: OBSERVATION
Discharge: HOME | End: 2023-09-21
Attending: EMERGENCY MEDICINE | Admitting: EMERGENCY MEDICINE
Payer: MEDICAID

## 2023-09-20 ENCOUNTER — APPOINTMENT (OUTPATIENT)
Dept: GENERAL RADIOLOGY | Facility: HOSPITAL | Age: 58
End: 2023-09-20
Payer: MEDICAID

## 2023-09-20 ENCOUNTER — APPOINTMENT (OUTPATIENT)
Dept: MRI IMAGING | Facility: HOSPITAL | Age: 58
End: 2023-09-20
Payer: MEDICAID

## 2023-09-20 ENCOUNTER — APPOINTMENT (OUTPATIENT)
Dept: CT IMAGING | Facility: HOSPITAL | Age: 58
End: 2023-09-20
Payer: MEDICAID

## 2023-09-20 DIAGNOSIS — F41.9 ANXIETY: ICD-10-CM

## 2023-09-20 DIAGNOSIS — R53.1 ACUTE LEFT-SIDED WEAKNESS: Primary | ICD-10-CM

## 2023-09-20 DIAGNOSIS — R20.0 LEFT SIDED NUMBNESS: ICD-10-CM

## 2023-09-20 PROBLEM — G45.9 TIA (TRANSIENT ISCHEMIC ATTACK): Status: ACTIVE | Noted: 2023-09-20

## 2023-09-20 LAB
ALBUMIN SERPL-MCNC: 4.5 G/DL (ref 3.5–5.2)
ALBUMIN/GLOB SERPL: 1.5 G/DL
ALP SERPL-CCNC: 86 U/L (ref 39–117)
ALT SERPL W P-5'-P-CCNC: 35 U/L (ref 1–41)
ANION GAP SERPL CALCULATED.3IONS-SCNC: 17 MMOL/L (ref 5–15)
APTT PPP: 23.4 SECONDS (ref 22.7–35.4)
AST SERPL-CCNC: 37 U/L (ref 1–40)
BASOPHILS # BLD AUTO: 0.05 10*3/MM3 (ref 0–0.2)
BASOPHILS NFR BLD AUTO: 0.6 % (ref 0–1.5)
BILIRUB SERPL-MCNC: 0.2 MG/DL (ref 0–1.2)
BUN SERPL-MCNC: 9 MG/DL (ref 8–23)
BUN/CREAT SERPL: 8.3 (ref 7–25)
CALCIUM SPEC-SCNC: 9.3 MG/DL (ref 8.6–10.5)
CHLORIDE SERPL-SCNC: 101 MMOL/L (ref 98–107)
CO2 SERPL-SCNC: 24 MMOL/L (ref 22–29)
CREAT SERPL-MCNC: 1.08 MG/DL (ref 0.76–1.27)
DEPRECATED RDW RBC AUTO: 44.9 FL (ref 37–54)
EGFRCR SERPLBLD CKD-EPI 2021: 78.6 ML/MIN/1.73
EOSINOPHIL # BLD AUTO: 0.35 10*3/MM3 (ref 0–0.4)
EOSINOPHIL NFR BLD AUTO: 4.5 % (ref 0.3–6.2)
ERYTHROCYTE [DISTWIDTH] IN BLOOD BY AUTOMATED COUNT: 13.5 % (ref 12.3–15.4)
GEN 5 2HR TROPONIN T REFLEX: 9 NG/L
GLOBULIN UR ELPH-MCNC: 3.1 GM/DL
GLUCOSE SERPL-MCNC: 140 MG/DL (ref 65–99)
HCT VFR BLD AUTO: 46 % (ref 37.5–51)
HGB BLD-MCNC: 15.8 G/DL (ref 13–17.7)
IMM GRANULOCYTES # BLD AUTO: 0.01 10*3/MM3 (ref 0–0.05)
IMM GRANULOCYTES NFR BLD AUTO: 0.1 % (ref 0–0.5)
INR PPP: 0.87 (ref 0.9–1.1)
LYMPHOCYTES # BLD AUTO: 3.45 10*3/MM3 (ref 0.7–3.1)
LYMPHOCYTES NFR BLD AUTO: 44.7 % (ref 19.6–45.3)
MCH RBC QN AUTO: 31.5 PG (ref 26.6–33)
MCHC RBC AUTO-ENTMCNC: 34.3 G/DL (ref 31.5–35.7)
MCV RBC AUTO: 91.6 FL (ref 79–97)
MONOCYTES # BLD AUTO: 0.57 10*3/MM3 (ref 0.1–0.9)
MONOCYTES NFR BLD AUTO: 7.4 % (ref 5–12)
NEUTROPHILS NFR BLD AUTO: 3.28 10*3/MM3 (ref 1.7–7)
NEUTROPHILS NFR BLD AUTO: 42.7 % (ref 42.7–76)
NRBC BLD AUTO-RTO: 0 /100 WBC (ref 0–0.2)
PLATELET # BLD AUTO: 328 10*3/MM3 (ref 140–450)
PMV BLD AUTO: 8.6 FL (ref 6–12)
POTASSIUM SERPL-SCNC: 3.8 MMOL/L (ref 3.5–5.2)
PROT SERPL-MCNC: 7.6 G/DL (ref 6–8.5)
PROTHROMBIN TIME: 11.9 SECONDS (ref 11.7–14.2)
QT INTERVAL: 344 MS
QTC INTERVAL: 442 MS
RBC # BLD AUTO: 5.02 10*6/MM3 (ref 4.14–5.8)
REF LAB TEST METHOD: NORMAL
SODIUM SERPL-SCNC: 142 MMOL/L (ref 136–145)
TROPONIN T DELTA: 1 NG/L
TROPONIN T SERPL HS-MCNC: 8 NG/L
WBC NRBC COR # BLD: 7.71 10*3/MM3 (ref 3.4–10.8)

## 2023-09-20 PROCEDURE — 85610 PROTHROMBIN TIME: CPT | Performed by: EMERGENCY MEDICINE

## 2023-09-20 PROCEDURE — G0378 HOSPITAL OBSERVATION PER HR: HCPCS

## 2023-09-20 PROCEDURE — 97161 PT EVAL LOW COMPLEX 20 MIN: CPT

## 2023-09-20 PROCEDURE — 85025 COMPLETE CBC W/AUTO DIFF WBC: CPT | Performed by: EMERGENCY MEDICINE

## 2023-09-20 PROCEDURE — 97110 THERAPEUTIC EXERCISES: CPT

## 2023-09-20 PROCEDURE — 73000 X-RAY EXAM OF COLLAR BONE: CPT

## 2023-09-20 PROCEDURE — 70551 MRI BRAIN STEM W/O DYE: CPT

## 2023-09-20 PROCEDURE — 84484 ASSAY OF TROPONIN QUANT: CPT | Performed by: EMERGENCY MEDICINE

## 2023-09-20 PROCEDURE — 73030 X-RAY EXAM OF SHOULDER: CPT

## 2023-09-20 PROCEDURE — 99254 IP/OBS CNSLTJ NEW/EST MOD 60: CPT | Performed by: STUDENT IN AN ORGANIZED HEALTH CARE EDUCATION/TRAINING PROGRAM

## 2023-09-20 PROCEDURE — 70450 CT HEAD/BRAIN W/O DYE: CPT

## 2023-09-20 PROCEDURE — 93010 ELECTROCARDIOGRAM REPORT: CPT | Performed by: INTERNAL MEDICINE

## 2023-09-20 PROCEDURE — 93005 ELECTROCARDIOGRAM TRACING: CPT | Performed by: EMERGENCY MEDICINE

## 2023-09-20 PROCEDURE — 80053 COMPREHEN METABOLIC PANEL: CPT | Performed by: EMERGENCY MEDICINE

## 2023-09-20 PROCEDURE — 36415 COLL VENOUS BLD VENIPUNCTURE: CPT

## 2023-09-20 PROCEDURE — 85730 THROMBOPLASTIN TIME PARTIAL: CPT | Performed by: EMERGENCY MEDICINE

## 2023-09-20 RX ORDER — ERGOCALCIFEROL 1.25 MG/1
50000 CAPSULE ORAL
Status: DISCONTINUED | OUTPATIENT
Start: 2023-09-20 | End: 2023-09-21 | Stop reason: HOSPADM

## 2023-09-20 RX ORDER — ACETAMINOPHEN 325 MG/1
650 TABLET ORAL EVERY 6 HOURS PRN
Status: DISCONTINUED | OUTPATIENT
Start: 2023-09-20 | End: 2023-09-21 | Stop reason: HOSPADM

## 2023-09-20 RX ORDER — FAMOTIDINE 20 MG/1
20 TABLET, FILM COATED ORAL 2 TIMES DAILY
Status: DISCONTINUED | OUTPATIENT
Start: 2023-09-20 | End: 2023-09-21 | Stop reason: HOSPADM

## 2023-09-20 RX ORDER — LOSARTAN POTASSIUM 25 MG/1
25 TABLET ORAL DAILY
Status: DISCONTINUED | OUTPATIENT
Start: 2023-09-20 | End: 2023-09-21 | Stop reason: HOSPADM

## 2023-09-20 RX ORDER — ATORVASTATIN CALCIUM 20 MG/1
40 TABLET, FILM COATED ORAL NIGHTLY
Status: DISCONTINUED | OUTPATIENT
Start: 2023-09-20 | End: 2023-09-20

## 2023-09-20 RX ORDER — LIDOCAINE 50 MG/G
1 PATCH TOPICAL ONCE
Status: COMPLETED | OUTPATIENT
Start: 2023-09-20 | End: 2023-09-20

## 2023-09-20 RX ORDER — ASPIRIN 300 MG/1
300 SUPPOSITORY RECTAL DAILY
Status: DISCONTINUED | OUTPATIENT
Start: 2023-09-20 | End: 2023-09-21 | Stop reason: HOSPADM

## 2023-09-20 RX ORDER — ACETAMINOPHEN 500 MG
1000 TABLET ORAL ONCE
Status: COMPLETED | OUTPATIENT
Start: 2023-09-20 | End: 2023-09-20

## 2023-09-20 RX ORDER — SODIUM CHLORIDE 0.9 % (FLUSH) 0.9 %
10 SYRINGE (ML) INJECTION AS NEEDED
Status: DISCONTINUED | OUTPATIENT
Start: 2023-09-20 | End: 2023-09-21 | Stop reason: HOSPADM

## 2023-09-20 RX ORDER — ONDANSETRON 2 MG/ML
4 INJECTION INTRAMUSCULAR; INTRAVENOUS EVERY 6 HOURS PRN
Status: DISCONTINUED | OUTPATIENT
Start: 2023-09-20 | End: 2023-09-21 | Stop reason: HOSPADM

## 2023-09-20 RX ORDER — ATORVASTATIN CALCIUM 80 MG/1
80 TABLET, FILM COATED ORAL NIGHTLY
Status: DISCONTINUED | OUTPATIENT
Start: 2023-09-20 | End: 2023-09-21 | Stop reason: HOSPADM

## 2023-09-20 RX ORDER — ASPIRIN 325 MG
325 TABLET ORAL DAILY
Status: DISCONTINUED | OUTPATIENT
Start: 2023-09-20 | End: 2023-09-21 | Stop reason: HOSPADM

## 2023-09-20 RX ADMIN — ATORVASTATIN CALCIUM 80 MG: 80 TABLET, FILM COATED ORAL at 20:06

## 2023-09-20 RX ADMIN — ACETAMINOPHEN 650 MG: 325 TABLET ORAL at 19:50

## 2023-09-20 RX ADMIN — LOSARTAN POTASSIUM 25 MG: 25 TABLET, FILM COATED ORAL at 10:16

## 2023-09-20 RX ADMIN — ASPIRIN 325 MG: 325 TABLET ORAL at 09:47

## 2023-09-20 RX ADMIN — ACETAMINOPHEN 1000 MG: 500 TABLET ORAL at 09:42

## 2023-09-20 RX ADMIN — FAMOTIDINE 20 MG: 20 TABLET, FILM COATED ORAL at 10:16

## 2023-09-20 RX ADMIN — ERGOCALCIFEROL 50000 UNITS: 1.25 CAPSULE ORAL at 13:33

## 2023-09-20 RX ADMIN — LIDOCAINE 1 PATCH: 50 PATCH TOPICAL at 09:47

## 2023-09-20 RX ADMIN — FAMOTIDINE 20 MG: 20 TABLET, FILM COATED ORAL at 20:06

## 2023-09-20 NOTE — CONSULTS
"Neurology Consult Note    Consult Date: 9/20/2023    Referring MD: No ref. provider found    Reason for Consult I have been asked to see the patient in neurological consultation to render advice and opinion regarding left-sided weakness    Heriberto Fields is a 58 y.o. male remote history of stroke with no residual deficits, hypertension, hyperlipidemia, recent admission with left-sided weakness admitted on 9/5/2023 with an NIH of 7 received TNK, post TNK was in the ICU his MRI CTA CTP were unremarkable, patient even had lumbar puncture and MRI C-spine to rule out multiple sclerosis and also work-up has been negative.    Patient comes back in with complaints of weakness and numbness since Friday been not able to walk since last night 10 PM.  Patient states he had a fall 2 days ago after that he started having left-sided weakness again.  Patient is unable to state why and how he had a fall.    Denies any speech problems or visual problems  No history of blood clots in legs or in chest.  Social history patient does not smoke, drinks alcohol socially, no history of IV or recreational drug use  Family history of coronary artery disease but no stroke    Past Medical History:   Diagnosis Date    GERD (gastroesophageal reflux disease)     Stroke        Exam  /78 (BP Location: Right arm, Patient Position: Lying)   Pulse 79   Temp 98.4 °F (36.9 °C) (Oral)   Resp 16   Ht 160 cm (63\")   Wt 73.8 kg (162 lb 11.2 oz)   SpO2 93%   BMI 28.82 kg/m²      Higher integrative function: Oriented to time, place, person. Spontaneous speech, fund of vocabulary are normal.   CN II: Normal visual acuity   CN III IV VI: Extraocular movements are full without nystagmus. Pupils are equal, round, and reactive to light.   CN V: Sensation same on both sides of the face  CN VII: Facial movements are symmetric, no weakness.    CN XII: The tongue is midline. No atrophy or fasciculations.   Motor: Effort dependent weakness on " the left upper and left lower at least 4 -/5  Right upper right lower 5+/5  fasciculations, rigidity, spasticity or abnormal movements.   Sensation: Subjective decrease sensation in left upper and left lower extremities  Normal pinprick vibration and proprioception bilateral upper and lower.  2+ reflexes bilateral upper lower  No clonus no Micheal sign  Does grimace to pain on the left upper and left lower  Coordination: Finger to nose test showed no dysmetria      DATA:    Lab Results   Component Value Date    GLUCOSE 140 (H) 09/20/2023    CALCIUM 9.3 09/20/2023     09/20/2023    K 3.8 09/20/2023    CO2 24.0 09/20/2023     09/20/2023    BUN 9 09/20/2023    CREATININE 1.08 09/20/2023    BCR 8.3 09/20/2023    ANIONGAP 17.0 (H) 09/20/2023     Lab Results   Component Value Date    WBC 7.71 09/20/2023    HGB 15.8 09/20/2023    HCT 46.0 09/20/2023    MCV 91.6 09/20/2023     09/20/2023       Lab review:   Sodium 142  Creatinine 1.08  Normal LFTs  WBC 7.71  Hemoglobin 15 and platelets 328    A1c 5.6      Imaging review:   Previous work-up from admission on 9/5/2023  CTA head/neck, CTP 9/4: No acute intracranial findings.  No cervical vascular stenosis or occlusion.  No intracranial stenosis or occlusion.  No perfusion abnormalities noted.  ECG 9/4: Sinus tachycardia, prolonged QT interval 387 MS  MRI brain wo 9/5: No acute infarct.  Brain parenchyma shows several small foci of T2 flair signal hyperintensity that are not although nonspecific suggestive of chronic small vessel ischemic change, other possibilities could include demyelination, Lyme disease, sequelae of migrainous headaches/vasculitis.  TTE: EF appears 61 to 65%.  All left ventricular wall segments contract normally.  No evidence of a left ventricular thrombus present.  Normal left atrial cavity size, saline test results negative.  MRI brain w/wo 9/7: No evidence of acute infarct.  Small nodular foci of FLAIR and T2 white matter  hyperintensity within white matter of left occipital lobe, left parietal lobe, both frontal lobes noted in a pattern suggestive of mild changes of chronic small vessel ischemic phenomenon.  No convincing evidence to suggest foci of demyelination.  MRI C-spine w/wo: No abnormal foci of signal intensity within the cervical spinal cord to suggest foci of demyelination or severe canal stenosis.  Minute degenerative disc changes seen at C5-6 and C6-7.  CTH wo 9/4: No acute intracranial findings status post tPA administration  CTH wo 9/5: No acute intracranial findings  LP 9/7: Glucose 61, protein 47.1, RBC 1/0 , TNC 0/2     Lab Review: A1c 5.6%,      CT head this morning-no acute abnormality no acute hypodensity or hyperdensity seen in compression wrap from previous scan    Diagnoses:  Left-sided weakness and/numbness    Patient had a complete work-up including MRI brain and C-spine which did not reveal any etiology for his left-sided weakness numbness, moreover on exam patient has very effort dependent weakness, I think there is a very good evidence for functional component of his symptoms.    MRI brain today shows no interval changes no diffusion restriction no T2 flair changes no GRE changes in comparison with the MRI done 2 weeks ago    Pre-stroke MRS: 0    NIHSS:    Baseline  0-->Alert: keenly responsive  0-->Answers both questions correctly  0-->Performs both tasks correctly  0=normal  0=No visual loss  0=Normal symmetric movement  2-->Some effort against gravity: limb cannot get to or maintain (if cued) 90 (or 45) degrees, drifts down to bed, but has some effort against gravity  0-->No drift: limb holds 90 (or 45) degrees for full 10 secs  2-->Some effort against gravity: limb cannot get to or maintain (if cued) 90 (or 45) degrees, drifts down to bed, but has some effort against gravity  0-->No drift: limb holds 90 (or 45) degrees for full 10 secs  0=Absent  1=Mild to moderate sensory loss; patient feels  pinprick is less sharp or is dull on the affected side; there is a loss of superficial pain with pinprick but patient is aware He is being touched  0=No aphasia, normal  0=Normal  0=No abnormality    Total score: 5      PLAN:   -Consult psychiatry  -Follow-up with physical therapy and outpatient therapy recommendations  -Will need outpatient neuromuscular testing with EMG and nerve conduction study.  - No further work up from neurology.      MDM   Reviewed: Previous charts, nursing notes and vitals   Reviewed: Previous labs and CT scan    Interpretation: Labs and CT scan   Total time providing critical care is :30-74 minutes. This excluded time spent performing separately reportable procedures and services  Consults :Neurology/Stroke    Salvador Ashford MD  Neuro Hospitalist /Vascular Neurology.

## 2023-09-20 NOTE — CASE MANAGEMENT/SOCIAL WORK
Discharge Planning Assessment  [facility]     Patient Name: Heriberto Fields  MRN: [id number]  Today's Date: 9/20/2023    Admit Date: 9/20/2023    Plan: Plans to return home at d/c-RYANN Clifton RN   Discharge Needs Assessment       Row Name 09/20/23 1329       Living Environment    People in Home child(diana), dependent;spouse    Name(s) of People in Home spouse, 15 yo child and 16 yo child    Current Living Arrangements home    Potentially Unsafe Housing Conditions none    Primary Care Provided by self    Provides Primary Care For no one    Family Caregiver if Needed spouse    Quality of Family Relationships supportive    Able to Return to Prior Arrangements yes       Resource/Environmental Concerns    Resource/Environmental Concerns none       Transition Planning    Patient/Family Anticipates Transition to home with family    Patient/Family Anticipated Services at Transition other (see comments)  Asked pt about Outpt PT and OT ordered at last d/c and he stated that his daughter-in-law, who is a PT, is providing these servcies at his hime and will continue to do so    Transportation Anticipated family or friend will provide       Discharge Needs Assessment    Equipment Currently Used at Home bp cuff    Concerns to be Addressed no discharge needs identified    Anticipated Changes Related to Illness none    Equipment Needed After Discharge none    Provided Post Acute Provider List? N/A    Provided Post Acute Provider Quality & Resource List? N/A                   Discharge Plan       Row Name 09/20/23 0942       Plan    Plan Plans to return home at d/cSEBASTIAN Clifton RN    Patient/Family in Agreement with Plan yes    Provided Post Acute Provider List? N/A    Provided Post Acute Provider Quality & Resource List? N/A    Plan Comments Spoke w/ pt at bedside w/ his permission. Introduced self and epxlained role. All info on facesheet, including PCP as GRETCHEN Croft, verified. Lives in single story house w/ no steps  to enter home, w/ wife and 15 yo and 18 yo children. Able to navigate around home w/o difficulty. Independent w/ ADLs. Uses B/P cuff at home. Denies need for any DME or community resources at d/c. When asked about Outpt PT and OT that were ordered at last d/c, he stated that his daughter-in-law, who is a PT, is doing this therapy at his home and plans to continue to do so. Uses [facility] at [facility] [employer]  and is able to obtain and pay for meds. To return home at d/c, w/ family's assist; family will transport; agreeable w/ plan. CM will continue to follow-RYANN Clifton RN                  Continued Care and Services - Admitted Since 9/20/2023    Coordination has not been started for this encounter.          Demographic Summary       Row Name 09/20/23 1326       General Information    Admission Type observation    Arrived From emergency department    Referral Source admission list    Reason for Consult discharge planning    Preferred Language English  English is his secondary language       Contact Information    Permission Granted to Share Info With                    Functional Status       Row Name 09/20/23 1328       Functional Status    Usual Activity Tolerance good    Current Activity Tolerance good       Functional Status, IADL    Medications independent    IADL Comments wife does most meal prep, housekeeping, laundry and shopping       Mental Status    General Appearance WDL WDL       Mental Status Summary    Recent Changes in Mental Status/Cognitive Functioning no changes                   Psychosocial       Row Name 09/20/23 1328       Behavior WDL    Behavior WDL WDL       Emotion Mood WDL    Emotion/Mood/Affect WDL WDL       Speech WDL    Speech WDL WDL       Perceptual State WDL    Perceptual State WDL WDL       Thought Process WDL    Thought Process WDL WDL       Intellectual Performance WDL    Intellectual Performance WDL WDL                   Abuse/Neglect    No documentation.                   Legal    No documentation.                  Substance Abuse    No documentation.                  Patient Forms    No documentation.                     Katie Clifton RN

## 2023-09-20 NOTE — THERAPY EVALUATION
Patient Name: Heriberto Fields  : 1965    MRN: [id number]                              Today's Date: 2023       Admit Date: 2023    Visit Dx:     ICD-10-CM ICD-9-CM   1. Acute left-sided weakness  R53.1 728.87   2. Left sided numbness  R20.0 782.0     Patient Active Problem List   Diagnosis    Stroke    Left arm weakness    HLD (hyperlipidemia)    TIA (transient ischemic attack)     Past Medical History:   Diagnosis Date    GERD (gastroesophageal reflux disease)     Stroke      History reviewed. No pertinent surgical history.   General Information       Row Name 23 1615          Physical Therapy Time and Intention    Document Type evaluation (P)   -     Mode of Treatment physical therapy (P)   -       Row Name 23 161          General Information    Patient Profile Reviewed yes (P)   -     Prior Level of Function independent: (P)   -     Existing Precautions/Restrictions no known precautions/restrictions (P)   -     Barriers to Rehab -- (P)   Slight language barrier  -       Row Name 23 161          Living Environment    People in Home child(diana), dependent;spouse (P)   -       Row Name 23 161          Home Main Entrance    Number of Stairs, Main Entrance none (P)   -       Row Name 23 1615          Stairs Within Home, Primary    Number of Stairs, Within Home, Primary none (P)   -       Row Name 23 161          Cognition    Orientation Status (Cognition) oriented x 4 (P)   -       Row Name 23 161          Safety Issues, Functional Mobility    Impairments Affecting Function (Mobility) balance;endurance/activity tolerance (P)   -               User Key  (r) = Recorded By, (t) = Taken By, (c) = Cosigned By      Initials Name Provider Type    Melody Lemus, PT Student PT Student                   Mobility       Row Name 23 1617          Bed Mobility    Bed Mobility supine-sit;sit-supine (P)   -     Supine-Sit  [place] (Bed Mobility) supervision (P)   -     Sit-Supine [place] (Bed Mobility) supervision (P)   -       Row Name 09/20/23 1617          Sit-Stand Transfer    Sit-Stand [place] (Transfers) contact guard (P)   -       Row Name 09/20/23 1617          Gait/Stairs (Locomotion)    [place] Level (Gait) contact guard (P)   -     Distance in Feet (Gait) 100 ft (P)   -     Deviations/Abnormal Patterns (Gait) gait speed decreased;stride length decreased (P)   -     Bilateral Gait Deviations decreased arm swing (P)   -     Comment, (Gait/Stairs) Pt ambulated with CGA and no loss of balance, equal weight shift. (P)   -               User Key  (r) = Recorded By, (t) = Taken By, (c) = Cosigned By      Initials Name Provider Type    Melody Lemus, PT Student PT Student                   Obj/Interventions       Row Name 09/20/23 1627          Range of Motion Comprehensive    Comment, General Range of Motion WNL (P)   -[facility] Name 09/20/23 1627          Strength Comprehensive (MMT)    Comment, General Manual Muscle Testing (MMT) Assessment BUE: grossly 5/5, equal R/L; BLE: grossly 4+/5, equal R/L (P)   -       Row Name 09/20/23 1627          Motor Skills    Therapeutic Exercise other (see comments) (P)   5 reps AP, LAQ, quad sets bilaterally  -[facility] Name 09/20/23 1627          Balance    Balance Assessment sitting static balance;standing static balance (P)   -     Static Sitting Balance supervision (P)   -     Position, Sitting Balance sitting edge of bed (P)   -     Static Standing Balance standby assist (P)   -     Position/Device Used, Standing Balance unsupported (P)   -     Comment, Balance Pt had no loss of balance or unsteadiness noted, decreased gait speed and stride length present. (P)   -       Row Name 09/20/23 1627          Sensory Assessment (Somatosensory)    Sensory Assessment (Somatosensory) bilateral LE (P)   -     Bilateral LE Sensory Assessment  intact (P)   Pt reported numbness in L thigh this morning but it had improved today.  -               User Key  (r) = Recorded By, (t) = Taken By, (c) = Cosigned By      Initials Name Provider Type    Melody Lemus, PT Student PT Student                   Goals/Plan    No documentation.                  Clinical Impression       [facility] Name 09/20/23 1629          Pain    Pre/Posttreatment Pain Comment Pt did not rate any current pain but stated he had pain before but it was being helped with medication. (P)   -PRITI       [facility] Name 09/20/23 1629          Plan of Care Review    Plan of Care Reviewed With patient (P)   -     Outcome Evaluation Pt is a 58 y.o. seen with symptoms of L sided weakness, hx of stroke and was admitted earlier this month. Today pt presents with equal strength and sensation in BUE and BLE, WNL bilaterally. Pt able to complete all LE exercises with no difficulty, verbal cues needed for correct technique. Bed mobility completed with supervision assist. All other mobility needed CGA. No loss of balance noted during ambulation, decreased speed and arm swing noted but no significant unsteadiness. PT rec pt to home with assist once medically cleared and to continue with home PT. (P)   -[facility] Name 09/20/23 1629          Therapy Assessment/Plan (PT)    Criteria for Skilled Interventions Met (PT) no problems identified which require skilled intervention (P)   -     Therapy Frequency (PT) evaluation only (P)   -[facility] Name 09/20/23 1629          Positioning and Restraints    Pre-Treatment Position in bed (P)   -     Post Treatment Position bed (P)   -     In Bed call light within reach;encouraged to call for assist;exit alarm on;fowlers (P)   -               User Key  (r) = Recorded By, (t) = Taken By, (c) = Cosigned By      Initials Name Provider Type    Melody Lemus, PT Student PT Student                   Outcome Measures       Row Name 09/20/23 1631 09/20/23 0830       How  much help from another person do you currently need...    Turning from your back to your side while in flat bed without using bedrails? 4 (P)   -KH 3  -NZ    Moving from lying on back to sitting on the side of a flat bed without bedrails? 4 (P)   -KH 3  -NZ    Moving to and from a bed to a chair (including a wheelchair)? 4 (P)   -KH 2  -NZ    Standing up from a chair using your arms (e.g., wheelchair, bedside chair)? 4 (P)   -KH 1  -NZ    Climbing 3-5 steps with a railing? 3 (P)   -KH 1  -NZ    To walk in hospital room? 3 (P)   -KH 1  -NZ    AM-PAC 6 Clicks Score (PT) 22 (P)   -KH 11  -NZ    Highest level of mobility 7 --> Walked 25 feet or more (P)   -KH 4 --> Transferred to chair/commode  -NZ      Row Name 09/20/23 0457 09/20/23 0446       How much help from another person do you currently need...    Turning from your back to your side while in flat bed without using bedrails? 3  -DA 3  -DA    Moving from lying on back to sitting on the side of a flat bed without bedrails? 3  -DA 3  -DA    Moving to and from a bed to a chair (including a wheelchair)? 2  -DA 2  -DA    Standing up from a chair using your arms (e.g., wheelchair, bedside chair)? 1  -DA 1  -DA    Climbing 3-5 steps with a railing? 1  -DA 1  -DA    To walk in hospital room? 1  -DA 1  -DA    AM-PAC 6 Clicks Score (PT) 11  -DA 11  -DA    Highest level of mobility 4 --> Transferred to chair/commode  -DA 4 --> Transferred to chair/commode  -DA              User Key  (r) = Recorded By, (t) = Taken By, (c) = Cosigned By      Initials Name Provider Type    Rancho Hutchins, RN Registered Nurse    Gustavo Robb RN Registered Nurse    Melody Lemus, PT Student PT Student                                 Physical Therapy Education       Title: PT OT SLP Therapies (Done)       Topic: Physical Therapy (Done)       Point: Mobility training (Done)       Learning Progress Summary             Patient Acceptance, E,D, DU,VU by PRITI at 9/20/2023 1666                          Point: Home exercise program (Done)       Learning Progress Summary             Patient Acceptance, E,D, DU,VU by  at 9/20/2023 1632                         Point: Body mechanics (Done)       Learning Progress Summary             Patient Acceptance, E,D, DU,VU by  at 9/20/2023 1632                         Point: Precautions (Done)       Learning Progress Summary             Patient Acceptance, E,D, DU,VU by  at 9/20/2023 1632                                         User Key       Initials Effective Dates Name Provider Type [facility] 08/10/23 -  Melody Pennington, PT Student PT Student PT                  PT Recommendation and Plan     Plan of Care Reviewed With: (P) patient  Outcome Evaluation: (P) Pt is a 58 y.o. seen with symptoms of L sided weakness, hx of stroke and was admitted earlier this month. Today pt presents with equal strength and sensation in BUE and BLE, WNL bilaterally. Pt able to complete all LE exercises with no difficulty, verbal cues needed for correct technique. Bed mobility completed with supervision assist. All other mobility needed CGA. No loss of balance noted during ambulation, decreased speed and arm swing noted but no significant unsteadiness. PT rec pt to home with assist once medically cleared and to continue with home PT.     Time Calculation:         PT Charges       Row Name 09/20/23 1636             Time Calculation    Start Time 1508 (P)   -      Stop Time 1520 (P)   -      Time Calculation (min) 12 min (P)   -      PT Received On 09/20/23 (P)   -         Time Calculation- PT    Total Timed Code Minutes- PT 8 minute(s) (P)   -                User Key  (r) = Recorded By, (t) = Taken By, (c) = Cosigned By      Initials Name Provider Type    Meloyd Lemus, PT Student PT Student                  Therapy Charges for Today       Code Description Service Date Service Provider Modifiers Qty    79419513739 HC PT EVAL LOW COMPLEXITY 2 9/20/2023 Gorge  Melody, PT Student GP 1    [id number]  PT THER PROC EA 15 MIN 9/20/2023 Melody Pennington, PT Student GP 1            PT G-Codes  AM-PAC 6 Clicks Score (PT): (P) 22  PT Discharge Summary  Anticipated Discharge Disposition (PT): (P) home with assist (Pt can continue receiving home PT)    Melody Pennington, PT Student  9/20/2023

## 2023-09-20 NOTE — PLAN OF CARE
Goal Outcome Evaluation:         Patient admitted to the observation unit for a stroke rule out. NIH of 12. Passed dysphagia. Vss. Fell at home 2 days ago and not able to feel sensations to hius left side. MRI planned for this morning. Neurology consulting. Will continue to monitor for any changes

## 2023-09-20 NOTE — ED PROVIDER NOTES
EMERGENCY DEPARTMENT ENCOUNTER    Room Number:  10/10  PCP: Courtney Croft APRN  Historian: Patient      HPI:  Chief Complaint: Left-sided numbness  A complete HPI/ROS/PMH/PSH/SH/FH are unobtainable due to: None  Context: Heriberto Fields is a 58 y.o. male who presents to the ED c/o left-sided weakness and numbness that has been intermittently present now for the past couple of days.  He reports that the symptoms returned roughly 2 hours ago while at home.  He presented to the emergency room with similar complaints 2 weeks ago and received TNK though ended up having a negative stroke work-up.  He also currently complains of left shoulder discomfort that is made worse by movement.  He denies chest pain, shortness of breath, nausea/vomiting, or fever/chills.            PAST MEDICAL HISTORY  Active Ambulatory Problems     Diagnosis Date Noted    Stroke 09/04/2023    Left arm weakness 09/04/2023    HLD (hyperlipidemia) 09/06/2023     Resolved Ambulatory Problems     Diagnosis Date Noted    No Resolved Ambulatory Problems     Past Medical History:   Diagnosis Date    GERD (gastroesophageal reflux disease)          PAST SURGICAL HISTORY  No past surgical history on file.      FAMILY HISTORY  No family history on file.      SOCIAL HISTORY  Social History     Socioeconomic History    Marital status:    Tobacco Use    Smoking status: Never   Vaping Use    Vaping Use: Never used   Substance and Sexual Activity    Alcohol use: Yes     Comment: socially    Drug use: Never    Sexual activity: Defer         ALLERGIES  Patient has no known allergies.        REVIEW OF SYSTEMS  Review of Systems   Constitutional:  Negative for activity change, appetite change and fever.   HENT:  Negative for congestion and sore throat.    Eyes: Negative.    Respiratory:  Negative for cough and shortness of breath.    Cardiovascular:  Negative for chest pain and leg swelling.   Gastrointestinal:  Negative for abdominal pain,  We received a remote transmission from patient's monitor at home. Remote Linq report shows no arrhythmias. EP physician to review. We will continue to monitor remotely. Implanted for stroke. End of 31-day monitoring period 7-17-23. diarrhea and vomiting.   Endocrine: Negative.    Genitourinary:  Negative for decreased urine volume and dysuria.   Musculoskeletal:  Negative for neck pain.        Left shoulder pain   Skin:  Negative for rash and wound.   Allergic/Immunologic: Negative.    Neurological:  Positive for weakness and numbness. Negative for headaches.   Hematological: Negative.    Psychiatric/Behavioral: Negative.     All other systems reviewed and are negative.         PHYSICAL EXAM  ED Triage Vitals   Temp Heart Rate Resp BP SpO2   09/19/23 2352 09/19/23 2352 09/19/23 2352 09/20/23 0013 09/19/23 2352   97.4 °F (36.3 °C) 111 16 152/95 94 %      Temp src Heart Rate Source Patient Position BP Location FiO2 (%)   09/19/23 2352 09/19/23 2352 09/20/23 0013 09/20/23 0013 --   Tympanic Monitor Sitting Right arm        Physical Exam  Constitutional:       General: He is not in acute distress.     Appearance: Normal appearance. He is not ill-appearing or toxic-appearing.   HENT:      Head: Normocephalic and atraumatic.   Eyes:      Extraocular Movements: Extraocular movements intact.      Pupils: Pupils are equal, round, and reactive to light.   Cardiovascular:      Rate and Rhythm: Normal rate and regular rhythm.      Heart sounds: No murmur heard.    No friction rub. No gallop.   Pulmonary:      Effort: Pulmonary effort is normal.      Breath sounds: Normal breath sounds.   Abdominal:      General: Abdomen is flat. There is no distension.      Palpations: Abdomen is soft.      Tenderness: There is no abdominal tenderness.   Musculoskeletal:         General: No swelling or tenderness. Normal range of motion.      Cervical back: Normal range of motion and neck supple.   Skin:     General: Skin is warm and dry.   Neurological:      General: No focal deficit present.      Mental Status: He is alert and oriented to person, place, and time.      Sensory: No sensory deficit.      Motor: No weakness.   Psychiatric:         Mood and Affect: Mood  normal.         Behavior: Behavior normal.         Vital signs and nursing notes reviewed.          LAB RESULTS  Recent Results (from the past 24 hour(s))   ECG 12 Lead Stroke Evaluation    Collection Time: 09/20/23 12:20 AM   Result Value Ref Range    QT Interval 344 ms    QTC Interval 442 ms   Comprehensive Metabolic Panel    Collection Time: 09/20/23 12:26 AM    Specimen: Blood   Result Value Ref Range    Glucose 140 (H) 65 - 99 mg/dL    BUN 9 8 - 23 mg/dL    Creatinine 1.08 0.76 - 1.27 mg/dL    Sodium 142 136 - 145 mmol/L    Potassium 3.8 3.5 - 5.2 mmol/L    Chloride 101 98 - 107 mmol/L    CO2 24.0 22.0 - 29.0 mmol/L    Calcium 9.3 8.6 - 10.5 mg/dL    Total Protein 7.6 6.0 - 8.5 g/dL    Albumin 4.5 3.5 - 5.2 g/dL    ALT (SGPT) 35 1 - 41 U/L    AST (SGOT) 37 1 - 40 U/L    Alkaline Phosphatase 86 39 - 117 U/L    Total Bilirubin 0.2 0.0 - 1.2 mg/dL    Globulin 3.1 gm/dL    A/G Ratio 1.5 g/dL    BUN/Creatinine Ratio 8.3 7.0 - 25.0    Anion Gap 17.0 (H) 5.0 - 15.0 mmol/L    eGFR 78.6 >60.0 mL/min/1.73   Protime-INR    Collection Time: 09/20/23 12:26 AM    Specimen: Blood   Result Value Ref Range    Protime 11.9 11.7 - 14.2 Seconds    INR 0.87 (L) 0.90 - 1.10   aPTT    Collection Time: 09/20/23 12:26 AM    Specimen: Blood   Result Value Ref Range    PTT 23.4 22.7 - 35.4 seconds   High Sensitivity Troponin T    Collection Time: 09/20/23 12:26 AM    Specimen: Blood   Result Value Ref Range    HS Troponin T 8 <15 ng/L   CBC Auto Differential    Collection Time: 09/20/23 12:26 AM    Specimen: Blood   Result Value Ref Range    WBC 7.71 3.40 - 10.80 10*3/mm3    RBC 5.02 4.14 - 5.80 10*6/mm3    Hemoglobin 15.8 13.0 - 17.7 g/dL    Hematocrit 46.0 37.5 - 51.0 %    MCV 91.6 79.0 - 97.0 fL    MCH 31.5 26.6 - 33.0 pg    MCHC 34.3 31.5 - 35.7 g/dL    RDW 13.5 12.3 - 15.4 %    RDW-SD 44.9 37.0 - 54.0 fl    MPV 8.6 6.0 - 12.0 fL    Platelets 328 140 - 450 10*3/mm3    Neutrophil % 42.7 42.7 - 76.0 %    Lymphocyte % 44.7 19.6 - 45.3 %     Monocyte % 7.4 5.0 - 12.0 %    Eosinophil % 4.5 0.3 - 6.2 %    Basophil % 0.6 0.0 - 1.5 %    Immature Grans % 0.1 0.0 - 0.5 %    Neutrophils, Absolute 3.28 1.70 - 7.00 10*3/mm3    Lymphocytes, Absolute 3.45 (H) 0.70 - 3.10 10*3/mm3    Monocytes, Absolute 0.57 0.10 - 0.90 10*3/mm3    Eosinophils, Absolute 0.35 0.00 - 0.40 10*3/mm3    Basophils, Absolute 0.05 0.00 - 0.20 10*3/mm3    Immature Grans, Absolute 0.01 0.00 - 0.05 10*3/mm3    nRBC 0.0 0.0 - 0.2 /100 WBC       Ordered the above labs and reviewed the results.        RADIOLOGY  CT Head Without Contrast    Result Date: 9/20/2023  CT OF THE HEAD WITHOUT CONTRAST  HISTORY: Fall  COMPARISON: September 5, 2023  TECHNIQUE: Axial CT imaging was obtained through the brain. No IV contrast was administered.  FINDINGS: No acute intracranial hemorrhage is seen. Ventricles are normal in size. There is no midline shift or mass effect. No calvarial fracture is seen. Mucosal thickening within the paranasal sinuses is again noted.      No acute intracranial findings.  Radiation dose reduction techniques were utilized, including automated exposure control and exposure modulation based on body size.   This report was finalized on 9/20/2023 1:11 AM by Dr. Colette Hurd M.D.       Ordered the above noted radiological studies. Reviewed by me in PACS.            PROCEDURES  Procedures    EKG independently interpreted by myself as follows:    EKG          EKG time: 0020  Rhythm/Rate: NSR, 99  P waves and ME: nml  QRS, axis: nml, nml   ST and T waves: nml     Interpreted Contemporaneously by me, independently viewed  unchanged compared to prior 9/4/23            MEDICATIONS GIVEN IN ER  Medications   sodium chloride 0.9 % flush 10 mL (has no administration in time range)   atorvastatin (LIPITOR) tablet 40 mg (has no administration in time range)   ondansetron (ZOFRAN) injection 4 mg (has no administration in time range)   aspirin tablet 325 mg (has no administration in time  range)     Or   aspirin suppository 300 mg (has no administration in time range)                   MEDICAL DECISION MAKING, PROGRESS, and CONSULTS    All labs have been independently reviewed by me.  All radiology studies have been reviewed by me and I have also reviewed the radiology report.   EKG's independently viewed and interpreted by me.  Discussion below represents my analysis of pertinent findings related to patient's condition, differential diagnosis, treatment plan and final disposition.      Additional sources:  - Discussed/ obtained information from independent historians: History obtained from the patient himself at bedside.    - External (non-ED) record review: Upon medical records review, the patient was admitted to the hospital last from 9/4/2023 through 9/10/2023 for left-sided neurological deficits.  It was during this visit he received TNK.  His stroke work-up however was negative including MRI.    - Chronic or social conditions impacting care: Hyperlipidemia    - Shared decision making: Admission decision based on shared conversations have between myself, the patient and patient's family at bedside, as well as Viraj JEFF.      Orders placed during this visit:  Orders Placed This Encounter   Procedures    CT Head Without Contrast    MRI Brain Without Contrast    Comprehensive Metabolic Panel    Protime-INR    aPTT    High Sensitivity Troponin T    CBC Auto Differential    High Sensitivity Troponin T 2Hr    NPO Diet NPO Type: Sips with Meds    Vital Signs    Intake & Output    Weigh Patient    Vital Signs    Pulse Oximetry, Continuous    Telemetry - Maintain IV Access    Telemetry - Place Orders & Notify Provider of Results When Patient Experiences Acute Chest Pain, Dysrhythmia or Respiratory Distress    Notify Provider    Nursing Dysphagia Screening (Complete Prior to Giving Anything By Mouth)    RN to Place Order SLP Consult - Eval & Treat Choosing Reason of RN Dysphagia Screen Failed     Notify Provider if Patient Fails Dysphagia Screening    Intake and Output    Neuro Checks    NIHSS Assessment    Place Sequential Compression Device    Maintain Sequential Compression Device    Activity As Tolerated    Code Status and Medical Interventions:    Inpatient Neurology Consult Stroke    Oxygen Therapy- Nasal Cannula; 2 LPM    POC Glucose Q6H    ECG 12 Lead Stroke Evaluation    Insert Peripheral IV    Initiate ED Observation Status    CBC & Differential             Differential diagnosis includes but is not limited to:    Differential diagnosis includes but is not limited to acute CVA, TIA, intracranial hemorrhage, psychogenic deficits, seizure disorder, sepsis, or severe electrolyte disturbance.      Independent interpretation of labs, radiology studies, and discussions with consultants:    CT scan of the head was independently interpreted by myself with my interpretation showing no area of hemorrhage, mass effect, or acute ischemia/infarct.      ED Course as of 09/20/23 0323   Wed Sep 20, 2023   0013 I did discuss the patient's presentation and examination findings with the stroke neurologist on-call.  He reports that he remembers this patient from his previous hospitalization.  Given the fact that he had a completely normal work-up and received TNK on his last visit, he will not be a candidate for TNK today.  He recommends a noncontrast CT scan here in the emergency room and then likely admission for a MRI testing. [BM]   0300 On reevaluation, the patient is resting comfortably and without acute complaint.  Vital signs are stable and neurological exam remains fairly normal.  I informed the patient that his work-up is unremarkable but we would admit him today for further neurologic testing and evaluation.  The patient and spouse are in agreement with that plan and all questions have been answered. [BM]   0319 The patient's presentation, work-up, as well as diagnosis and treatment planning was discussed  at length with TOMER Amador.  He agrees to admit the patient to the observation unit today with Dr. Gutierrez. [BM]      ED Course User Index  [BM] Shakir Mcclendon MD             DIAGNOSIS  Final diagnoses:   Acute left-sided weakness   Left sided numbness         DISPOSITION  ADMISSION    Discussed treatment plan and reason for admission with pt/family and admitting physician.  Pt/family voiced understanding of the plan for admission for further testing/treatment as needed.               Latest Documented Vital Signs:  As of 03:23 EDT  BP- 136/85 HR- 77 Temp- 97.4 °F (36.3 °C) (Tympanic) O2 sat- 91%              --    Please note that portions of this were completed with a voice recognition program.       Note Disclaimer: At [facility], we believe that sharing information builds trust and better relationships. You are receiving this note because you are receiving care at [facility] or recently visited. It is possible you will see health information before a provider has talked with you about it. This kind of information can be easy to misunderstand. To help you fully understand what it means for your health, we urge you to discuss this note with your provider.             Shakir Mcclendon MD  09/20/23 9111

## 2023-09-20 NOTE — PLAN OF CARE
Goal Outcome Evaluation:  Plan of Care Reviewed With: (P) patient           Outcome Evaluation: (P) Pt is a 58 y.o. seen with symptoms of L sided weakness, hx of stroke and was admitted earlier this month. Today pt presents with equal strength and sensation in BUE and BLE, WNL bilaterally. Pt able to complete all LE exercises with no difficulty, verbal cues needed for correct technique. Bed mobility completed with supervision assist. All other mobility needed CGA. No loss of balance noted during ambulation, decreased speed and arm swing noted but no significant unsteadiness. PT rec pt to home with assist once medically cleared and to continue with home PT.      Anticipated Discharge Disposition (PT): (P) home with assist (Pt can continue receiving home PT)

## 2023-09-20 NOTE — CONSULTS
"Nutrition Services    Patient Name:  Heriberto Fields  YOB: 1965  MRN: [id number]  Admit Date:  9/20/2023    Assessment Date:  09/20/23    Summary: Nutrition consult due to MST score of 5 per nurse admission screen.  Admitted with intermittent L sided weakness since Friday.  Recently dc'ed after admission with similar symptoms.  CT head and MRI negative.    Possible weight loss noted per chart weight history.  No PO intake available at this time.    Patient unavailable at time of attempted visit.  RD to follow up to interview.    CLINICAL NUTRITION ASSESSMENT      Reason for Assessment MST score 2+, Nurse or Nurse Practitioner Consult     Diagnosis/Problem   TIA   Medical/Surgical History Past Medical History:   Diagnosis Date    GERD (gastroesophageal reflux disease)     Stroke        History reviewed. No pertinent surgical history.     Encounter Information        Nutrition History:     Factors Affecting Intake: swallow impairment     Anthropometrics        Current Height  Current Weight  BMI kg/m2 Height: 160 cm (63\")  Weight: 73.8 kg (162 lb 11.2 oz) (09/20/23 0011)  Body mass index is 28.82 kg/m².   Adjusted BMI (if applicable)    BMI Category Overweight (25 - 29.9)       Admission Weight 162 lb (9/20)       Ideal Body Weight (IBW) 124 lb (56.4 kg)       Usual Body Weight (UBW) 162-200 lb   Weight Trend Loss       Weight History Wt Readings from Last 30 Encounters:   09/20/23 0011 73.8 kg (162 lb 11.2 oz)   09/19/23 2352 80.3 kg (177 lb)   09/04/23 2337 80.5 kg (177 lb 7.5 oz)   09/04/23 2216 77.5 kg (170 lb 13.7 oz)   09/04/23 2052 90.7 kg (200 lb)   09/05/23 1821 80.3 kg (177 lb)   08/03/23 2051 79.8 kg (176 lb)   06/01/23 1308 84.4 kg (186 lb)   01/30/23 0310 81.6 kg (180 lb)      --  Tests/Procedures        Tests/Procedures CT scan, MRI, X-Ray     Labs       Pertinent Labs    Results from last 7 days   Lab Units 09/20/23  0026   SODIUM mmol/L 142   POTASSIUM mmol/L 3.8   CHLORIDE " mmol/L 101   CO2 mmol/L 24.0   BUN mg/dL 9   CREATININE mg/dL 1.08   CALCIUM mg/dL 9.3   BILIRUBIN mg/dL 0.2   ALK PHOS U/L 86   ALT (SGPT) U/L 35   AST (SGOT) U/L 37   GLUCOSE mg/dL 140*     Results from last 7 days   Lab Units 09/20/23  0026   HEMOGLOBIN g/dL 15.8   HEMATOCRIT % 46.0   WBC 10*3/mm3 7.71   ALBUMIN g/dL 4.5     Results from last 7 days   Lab Units 09/20/23  0026   INR  0.87*   APTT seconds 23.4   PLATELETS 10*3/mm3 328     SARS-CoV-2, KADE   Date Value Ref Range Status   12/06/2021 NEGATIVE Negative Final     Comment:     The 2019-CoV rRT-PCR Assay is only for use under a Food and Drug Administration Emergency Use Authorization. The performance characteristics of the assay were verified by the Clinical Laboratory at [facility]. Results should be used in   conjunction with the patient's clinical symptoms, medical history, and other clinical/laboratory findings to determine an overall clinical diagnosis. Negative results do not preclude infection with SARS-CoV-2 (COVID-19).    Test parameters have not been validated for screening asymptomatic patients.     Lab Results   Component Value Date    HGBA1C 5.60 09/05/2023          Medications           Scheduled Medications aspirin, 325 mg, Oral, Daily   Or  aspirin, 300 mg, Rectal, Daily  atorvastatin, 80 mg, Oral, Nightly  famotidine, 20 mg, Oral, BID  lidocaine, 1 patch, Transdermal, Once  losartan, 25 mg, Oral, Daily  vitamin D, 50,000 Units, Oral, Q7 Days       Infusions     PRN Medications   ondansetron    [COMPLETED] Insert Peripheral IV **AND** sodium chloride     Physical Findings          General Appearance alert, oriented, overweight, room air   Oral/Mouth Cavity WNL   Edema  no edema   Gastrointestinal last bowel movement: 9/19   Skin  skin intact   Tubes/Drains/Lines none   NFPE Other: unavailable at time of attempted visit   --  Current Nutrition Orders & Evaluation of Intake       Oral Nutrition     Food Allergies NKFA    Current PO Diet Diet: Cardiac Diets; Healthy Heart (2-3 Na+); Texture: Regular Texture (IDDSI 7); Fluid Consistency: Thin (IDDSI 0)   Supplement n/a   PO Evaluation     % PO Intake/# of Days No intake available    --  PES STATEMENT / NUTRITION DIAGNOSIS      Nutrition Dx Problem  Problem: Predicted Suboptimal Intake  Etiology: Medical Diagnosis - TIA    Signs/Symptoms: Report/Observation     --  NUTRITION INTERVENTION / PLAN OF CARE      Intervention Goal(s) Maintain nutrition status, Reduce/improve symptoms, Disease management/therapy, Establish PO intake, Tolerate PO , and No significant weight loss         RD Intervention/Action Continue to monitor and Care plan reviewed         Prescription/Orders:       PO Diet       Supplements       Snacks       Enteral Nutrition       Parenteral Nutrition    New Prescription Ordered? No changes at this time   --      Monitor/Evaluation Per protocol, PO intake, Pertinent labs, Weight, Symptoms   Discharge Plan/Needs Pending clinical course   --    RD to follow per protocol.      Electronically signed by:  Zohreh Watson RD  09/20/23 16:35 EDT

## 2023-09-20 NOTE — H&P
. [facility]   HISTORY AND PHYSICAL    Patient Name: Heriberto Fields  : 1965  MRN: [id number]  Primary Care Physician:  Courtney Croft APRN  Date of admission: 2023    Subjective   Subjective     Chief Complaint: Left-sided weakness    HPI:    Heriberto Fields is a 58 y.o. male with past medical history including but limited to hypertension, hyperlipidemia and history of stroke presents to [facility] with intermittent left-sided weakness and numbness since Friday.  States that he has not been able to walk since 10 PM secondary to left side weakness and numbness.  States that he recently fell on Friday due to left-sided weakness.  Reports associated visual signs of left eye.  Denies headache, dysarthria, or aphasia.  Denies chest pain, palpitation, or dyspnea.  Denies abdominal pain, nausea, vomit, diarrhea.    Patient was recently discharged from the hospital after spending almost a week for similar symptoms.  Patient received TNK and was admitted to the ICU.  His work-up included CTA head and neck and MRI brain with that showed no evidence of acute infarct or hemorrhage.  Echo showed EF 61-65% with normal LV systolic and diastolic function    Laboratory evaluation in the ED relatively unremarkable and CT head without negative for acute intracranial findings.    Review of Systems   All systems were reviewed and negative except for: That mentioned above in HPI    Personal History     Past Medical History:   Diagnosis Date    GERD (gastroesophageal reflux disease)     Stroke        No past surgical history on file.    Family History: family history is not on file. Otherwise pertinent FHx was reviewed and not pertinent to current issue.    Social History:  reports that he has never smoked. He does not have any smokeless tobacco history on file. He reports current alcohol use. He reports that he does not use drugs.    Home Medications:  atorvastatin,  budesonide, diclofenac, diphenhydrAMINE, famotidine, levocetirizine, losartan, methocarbamol, and vitamin D    Allergies:  No Known Allergies    Objective   Objective     Vitals:   Temp:  [97.4 °F (36.3 °C)] 97.4 °F (36.3 °C)  Heart Rate:  [] 77  Resp:  [16] 16  BP: (136-152)/(85-96) 136/85  Physical Exam    Constitutional: Awake, alert   Eyes: PERRLA, sclerae anicteric, no conjunctival injection   HENT: NCAT, mucous membranes moist   Neck: Supple, no thyromegaly, no lymphadenopathy, trachea midline   Respiratory: Clear to auscultation bilaterally, nonlabored respirations    Cardiovascular: RRR, no murmurs, rubs, or gallops, palpable pedal pulses bilaterally   Gastrointestinal: Positive bowel sounds, soft, nontender, nondistended   Musculoskeletal: No bilateral ankle edema, no clubbing or cyanosis to extremities   Psychiatric: Appropriate affect, cooperative   Neurologic: Oriented x 3, strength symmetric in all extremities, Cranial Nerves grossly intact to confrontation, speech clear   Skin: No rashes     NIH Stroke Scale      Interval: Baseline  Time: 04:31 EDT  Person Administering Scale: TOMER Martinez    Administer stroke scale items in the order listed. Record performance in each category after each subscale exam. Do not go back and change scores. Follow directions provided for each exam technique. Scores should reflect what the patient does, not what the clinician thinks the patient can do. The clinician should record answers while administering the exam and work quickly. Except where indicated, the patient should not be coached (i.e., repeated requests to patient to make a special effort).      1a  Level of consciousness: 0=alert; keenly responsive   1b. LOC questions:  0=Performs both tasks correctly   1c. LOC commands: 0=Answers both questions correctly   2.  Best Gaze: 0=normal   3.  Visual: 0=No visual loss   4. Facial Palsy: 0=Normal symmetric movement   5a.  Motor left arm: 3=No effort  against gravity, limb falls   5b.  Motor right arm: 0=No drift, limb holds 90 (or 45) degrees for full 10 seconds   6a. motor left le=No movement   6b  Motor right le=No drift, limb holds 90 (or 45) degrees for full 10 seconds   7. Limb Ataxia: 0=Absent   8.  Sensory: 2=Severe to total sensory loss; patient is not aware of being touched in face, arm, leg   9. Best Language:  0=No aphasia, normal   10. Dysarthria: 0=Normal   11. Extinction and Inattention: 0=No abnormality   12. Distal motor function: 0=Normal    Total:   9     Result Review    Result Review:  I have personally reviewed the results from the time of this admission to 2023 03:23 EDT and agree with these findings:  []  Laboratory list / accordion  []  Microbiology  []  Radiology  []  EKG/Telemetry   []  Cardiology/Vascular   []  Pathology  []  Old records  []  Other:      Assessment & Plan   Assessment / Plan     Brief Patient Summary:  Heriberto Fields is a 58 y.o. male who was seen and evaluated the ED for left-sided weakness    Active Hospital Problems:  Active Hospital Problems    Diagnosis     **TIA (transient ischemic attack)      Plan:   TIA  -CT head without negative  -Neurology consult  -MRI brain without pending  -NIH 9  -Aspirin and statin    Hypertension  -Continue losartan  -Vital signs per nursing    DVT prophylaxis:  Mechanical DVT prophylaxis orders are present.    CODE STATUS:    Level Of Support Discussed With: Patient  Code Status (Patient has no pulse and is not breathing): CPR (Attempt to Resuscitate)  Medical Interventions (Patient has pulse or is breathing): Full Support    Admission Status:  I believe this patient meets observation status.    77 minutes has been spent by [facility] providers in the care of this patient while under observation status    .During patient visit, I utilized appropriate personal protective equipment including gloves. Appropriate PPE was worn  during the entire visit.  Hand hygiene was completed before and after    Electronically signed by Nazanin Robles, TOMER, 09/20/23, 3:23 AM EDT.

## 2023-09-20 NOTE — PROGRESS NOTES
The PAMELA and I have discussed this patient's history, physical exam and treatment plan.  I provided a substantive portion of the care of this patient.  I have reviewed the documentation and personally had a face to face interaction with the patient and personally performed the physical exam, in its entirety.  I affirm the documentation and agree with the treatment and plan.  The following describes my personal findings.      The patient presents complaining of left-sided weakness/numbness for the past 3 days.  Patient reports he fell 3 to 4 days prior to arrival with some discomfort in his left shoulder.  Patient denies headache, head injury, loss of consciousness with his fall.  Patient reports he has been under a lot of stress recently.    Of note patient admitted 9/4-9/10/23 with NIH of 7, given lytics, MRI unremarkable, LP, MRI C-spine all negative at that time.      Comprehensive Physical exam:  Patient is nontoxic appearing awake, oriented awake, alert  HEENT: normocephalic, atraumatic  Neck: no goiter, no pain with ROM  Pulmonary: Nontachypneic, breath sounds are well bilaterally  cardiovascular: Nontachycardic  Abdomen: Soft, nontender  musculoskeletal: Good range of motion on right upper extremity, left upper extremity with waxing waning weakness, tender to palpation left shoulder and left clavicle without swelling or deformity, decreased sensation left arm and leg  Neuro/psychiatric:calm, appropriate, cooperative  Skin:warm, dry      Plan:   Left-sided weakness  -CT head without negative  -Neurology consult, seen by Dr. Ashford who is cleared from neurologic standpoint, recommends mental health evaluation given lack of objective evidence of source of patient's weakness  -RN this AM reports patient with movement of LUE, adjusting bed clothes, using his phone with left hand when distracted with immediately decreased use of left side when specifically asked about his movement  -MRI brain without negative for  acute CVA  -Unilateral neurologic deficits waxing and waning through hospital course  -Aspirin and statin     Hypertension  -Continue losartan  -Vital signs per nursing

## 2023-09-20 NOTE — NURSING NOTE
"This nurse in room with patient administering medication and noted patient holding his phone with his left hand. This nurse assessed patient left side function and patient \"was not able to hold his arm up\"   "

## 2023-09-20 NOTE — ED TRIAGE NOTES
Pt per wheelchair from home after reports that began onset x2 hours pta with left sided sensory deficit x2 hours and also reports vision changes to left eye.  States has had this happen in the past

## 2023-09-20 NOTE — ED NOTES
Nursing report ED to floor  Heriberto Fields  58 y.o.  male    HPI :   Chief Complaint   Patient presents with    Numbness    Blurred Vision       Admitting doctor:   Hoda Gutierrez MD    Admitting diagnosis:   The primary encounter diagnosis was Acute left-sided weakness. A diagnosis of Left sided numbness was also pertinent to this visit.    Code status:   Current Code Status       Date Active Code Status Order ID Comments User Context       9/20/2023 0321 CPR (Attempt to Resuscitate) [id number]  Nazanin Robles APRN ED        Question Answer    Code Status (Patient has no pulse and is not breathing) CPR (Attempt to Resuscitate)    Medical Interventions (Patient has pulse or is breathing) Full Support    Level Of Support Discussed With Patient                    Allergies:   Patient has no known allergies.    Isolation:   No active isolations    Intake and Output  No intake or output data in the 24 hours ending 09/20/23 0333    Weight:       09/20/23  0011   Weight: 73.8 kg (162 lb 11.2 oz)       Most recent vitals:   Vitals:    09/20/23 0011 09/20/23 0013 09/20/23 0023 09/20/23 0301   BP:  152/95 143/96 136/85   BP Location:  Right arm     Patient Position:  Sitting     Pulse:   110 77   Resp:       Temp:       TempSrc:       SpO2:   92% 91%   Weight: 73.8 kg (162 lb 11.2 oz)      Height:           Active LDAs/IV Access:   Lines, Drains & Airways       Active LDAs       Name Placement date Placement time Site Days    Peripheral IV 09/20/23 0015 Left Antecubital 09/20/23  0015  Antecubital  less than 1                    Labs (abnormal labs have a star):   Labs Reviewed   COMPREHENSIVE METABOLIC PANEL - Abnormal; Notable for the following components:       Result Value    Glucose 140 (*)     Anion Gap 17.0 (*)     All other components within normal limits    Narrative:     GFR Normal >60  Chronic Kidney Disease <60  Kidney Failure <15     PROTIME-INR - Abnormal; Notable for the following components:     INR 0.87 (*)     All other components within normal limits   CBC WITH AUTO DIFFERENTIAL - Abnormal; Notable for the following components:    Lymphocytes, Absolute 3.45 (*)     All other components within normal limits   APTT - Normal   TROPONIN - Normal    Narrative:     High Sensitive Troponin T Reference Range:  <10.0 ng/L- Negative Female for AMI  <15.0 ng/L- Negative Male for AMI  >=10 - Abnormal Female indicating possible myocardial injury.  >=15 - Abnormal Male indicating possible myocardial injury.   Clinicians would have to utilize clinical acumen, EKG, Troponin, and serial changes to determine if it is an Acute Myocardial Infarction or myocardial injury due to an underlying chronic condition.        HIGH SENSITIVITIY TROPONIN T 2HR   POCT GLUCOSE FINGERSTICK   POCT GLUCOSE FINGERSTICK   POCT GLUCOSE FINGERSTICK   POCT GLUCOSE FINGERSTICK   CBC AND DIFFERENTIAL    Narrative:     The following orders were created for panel order CBC & Differential.  Procedure                               Abnormality         Status                     ---------                               -----------         ------                     CBC Auto Differential[[id number]]        Abnormal            Final result                 Please view results for these tests on the individual orders.       EKG:   ECG 12 Lead Stroke Evaluation   Preliminary Result   HEART RATE= 99  bpm   RR Interval= 606  ms   ID Interval= 190  ms   P Horizontal Axis= -18  deg   P Front Axis= 30  deg   QRSD Interval= 104  ms   QT Interval= 344  ms   QTcB= 442  ms   QRS Axis= 4  deg   T Wave Axis= 14  deg   - BORDERLINE ECG -   Sinus rhythm   RSR' in V1 or V2, right VCD or RVH   Borderline ST elevation, anterior leads   Electronically Signed By:    Date and Time of Study: 2023-09-20 00:20:41          Meds given in ED:   Medications   sodium chloride 0.9 % flush 10 mL (has no administration in time range)   atorvastatin (LIPITOR) tablet 40 mg (has no  administration in time range)   ondansetron (ZOFRAN) injection 4 mg (has no administration in time range)   aspirin tablet 325 mg (has no administration in time range)     Or   aspirin suppository 300 mg (has no administration in time range)       Imaging results:  CT Head Without Contrast    Result Date: 9/20/2023  No acute intracranial findings.  Radiation dose reduction techniques were utilized, including automated exposure control and exposure modulation based on body size.   This report was finalized on 9/20/2023 1:11 AM by Dr. Colette Hurd M.D.       Ambulatory status:   - as tolerated- unable to ambulate in ER    Social issues:   Social History     Socioeconomic History    Marital status:    Tobacco Use    Smoking status: Never   Vaping Use    Vaping Use: Never used   Substance and Sexual Activity    Alcohol use: Yes     Comment: socially    Drug use: Never    Sexual activity: Defer       NIH Stroke Scale:  Interval: baseline    Suzan Desai RN  09/20/23 03:33 EDT

## 2023-09-21 ENCOUNTER — READMISSION MANAGEMENT (OUTPATIENT)
Dept: CALL CENTER | Facility: HOSPITAL | Age: 58
End: 2023-09-21
Payer: MEDICAID

## 2023-09-21 ENCOUNTER — APPOINTMENT (OUTPATIENT)
Dept: NEUROLOGY | Facility: HOSPITAL | Age: 58
End: 2023-09-21
Payer: MEDICAID

## 2023-09-21 VITALS
SYSTOLIC BLOOD PRESSURE: 140 MMHG | WEIGHT: 162.7 LBS | DIASTOLIC BLOOD PRESSURE: 95 MMHG | HEIGHT: 63 IN | HEART RATE: 85 BPM | BODY MASS INDEX: 28.83 KG/M2 | OXYGEN SATURATION: 99 % | TEMPERATURE: 97.5 F | RESPIRATION RATE: 17 BRPM

## 2023-09-21 PROCEDURE — G0378 HOSPITAL OBSERVATION PER HR: HCPCS

## 2023-09-21 PROCEDURE — 95816 EEG AWAKE AND DROWSY: CPT | Performed by: PSYCHIATRY & NEUROLOGY

## 2023-09-21 PROCEDURE — 95816 EEG AWAKE AND DROWSY: CPT

## 2023-09-21 RX ADMIN — LOSARTAN POTASSIUM 25 MG: 25 TABLET, FILM COATED ORAL at 10:09

## 2023-09-21 RX ADMIN — FAMOTIDINE 20 MG: 20 TABLET, FILM COATED ORAL at 10:09

## 2023-09-21 RX ADMIN — ASPIRIN 325 MG: 325 TABLET ORAL at 10:09

## 2023-09-21 NOTE — PLAN OF CARE
Goal Outcome Evaluation:              Outcome Evaluation: patient left observatio unit at this time via private vehicle with all his belongins, discharge summmary provided and educatio included aware to follow up wtih primry care as indicated and with pschycology, IV removed and had no other questions prior to discharge.

## 2023-09-21 NOTE — PLAN OF CARE
Goal Outcome Evaluation:  Plan of Care Reviewed With: patient        Progress: improving  Outcome Evaluation: No neuro deficits noted this shift. NIH 0. Normal ADL's noted last night. No mobility issues noted last night. VSS.

## 2023-09-21 NOTE — DISCHARGE SUMMARY
.ED OBSERVATION PROGRESS/DISCHARGE SUMMARY    Date of Admission: 9/20/2023   LOS: 0 days   PCP: Courtney Croft APRN    Subjective:   Patient states he is feeling well today. He reports full function of his left side at this time. Denies lightheadedness and dizziness. Denies headache. Denies chest pain and shortness of breath.     Hospital Outcome:   58-year-old male was seen and examined at bedside following admission to the observation unit due to left-sided weakness and paresthesia.  After evaluation relatively unremarkable.  CT head without shows no evidence of acute infarct and MRI brain without shows no evidence of acute intracranial findings.  Patient was seen by Dr. Ashford, EEG ordered that showed no potentially epileptogenic activity, seizure activity, or focal slowing present.  Suspects a functional component and recommended evaluation with psychiatry.    Dr. Duran with psychology saw and evaluated the patient, as patient's symptoms seem to resolved from a psychiatric standpoint he is stable for discharge.  I will place an outpatient referral for continued follow-up with psychiatry.    Patient in agreement with the plan.    ROS:  General: no fevers, chills  Respiratory: no cough, dyspnea  Cardiovascular: no chest pain, palpitations  Abdomen: No abdominal pain, nausea, vomiting, or diarrhea  Neurologic: No focal weakness    Objective   Physical Exam:  I have reviewed the vital signs.  Temp:  [97.5 °F (36.4 °C)-98.6 °F (37 °C)] 97.5 °F (36.4 °C)  Heart Rate:  [65-89] 85  Resp:  [16-18] 17  BP: (115-140)/(81-95) 140/95  General Appearance:  58-year-old male in no acute distress on room air  Head:    Normocephalic, atraumatic  Eyes:    Sclerae anicteric  Neck:   Supple, no mass  Lungs: Clear to auscultation bilaterally, respirations unlabored  Heart: Regular rate and rhythm, S1 and S2 normal, no murmur, rub or gallop  Abdomen:  Soft, non-tender, bowel sounds active, nondistended  Extremities: No  clubbing, cyanosis, or edema to lower extremities  Pulses:  2+ and symmetric in distal lower extremities  Skin: No rashes   Neurologic: Oriented x3, Normal strength to extremities    Results Review:    I have reviewed the labs, radiology results and diagnostic studies.    Results from last 7 days   Lab Units 09/20/23  0026   WBC 10*3/mm3 7.71   HEMOGLOBIN g/dL 15.8   HEMATOCRIT % 46.0   PLATELETS 10*3/mm3 328     Results from last 7 days   Lab Units 09/20/23  0026   SODIUM mmol/L 142   POTASSIUM mmol/L 3.8   CHLORIDE mmol/L 101   CO2 mmol/L 24.0   BUN mg/dL 9   CREATININE mg/dL 1.08   CALCIUM mg/dL 9.3   BILIRUBIN mg/dL 0.2   ALK PHOS U/L 86   ALT (SGPT) U/L 35   AST (SGOT) U/L 37   GLUCOSE mg/dL 140*     Imaging Results (Last 24 Hours)       Procedure Component Value Units Date/Time    MRI Brain Without Contrast [[id number]] Collected: 09/20/23 0808     Updated: 09/20/23 1646    Narrative:      MRI EXAMINATION OF THE BRAIN WITHOUT CONTRAST     HISTORY: Left-sided weakness, numbness.     COMPARISON: CT head 09/20/2023 and MRI brain 09/07/2023.     TECHNIQUE: A MRI examination of the brain was performed utilizing  sagittal T1, axial diffusion, T1, T2, T2 FLAIR and gradient echo T2  imaging.     FINDINGS: There is no evidence of restricted diffusion to suggest acute  infarction. There is expected flow void in the basilar artery and in the  distal aspect of the internal carotid arteries bilaterally on the axial  T2 sequence. Small remote infarcts involving the white matter of the  cerebral hemispheres bilaterally are noted, present previously and  unchanged. Small scattered foci of T2 FLAIR hyperintensity involving the  subcortical white matter of the cerebral hemispheres is noted  bilaterally consistent with mild small vessel ischemic disease. Moderate  mucosal thickening involving the ethmoid air cells is appreciated  bilaterally which is more prominent as compared to 09/07/2023. There is  no evidence of mass or of  mass effect on this noncontrasted MRI  examination of the brain.       Impression:      1.  There is no evidence of an acute infarct. Small remote infarcts  involving the cerebellar hemispheres bilaterally are noted present  previously.  2.  Mild small vessel ischemic disease is noted.  3.  Small remote infarcts involving the white matter of the cerebellar  hemispheres bilaterally were noted present previously.     This report was finalized on 9/20/2023 4:43 PM by Dr. Seven Ceja M.D.               I have reviewed the medications.  ---------------------------------------------------------------------------------------------  Assessment & Plan   Assessment/Problem List    TIA (transient ischemic attack)      Plan:    Left-sided weakness  -CT head without negative  -Neurology consult, seen by Dr. Ashford who is cleared from neurologic standpoint, recommends mental health evaluation given lack of objective evidence of source of patient's weakness  -RN this AM reports patient with movement of LUE, adjusting bed clothes, using his phone with left hand when distracted with immediately decreased use of left side when specifically asked about his movement  -MRI brain without negative for acute CVA  -EEG normal  -Unilateral neurologic deficits waxing and waning through hospital course  -Aspirin and statin  -Psych consulted for suspicion of functional component to symptoms, symptoms resolved at this time, no current recommendations, outpatient follow-up  -Patient will follow-up with neurology outpatient for neuromuscular testing with EMG and nerve conduction study.        Hypertension  -Continue losartan  -Vital signs per nursing     Disposition: Home    Follow-up after Discharge: PCP, neuro, psychiatry    This note will serve as a discharge summary    Lisa Ga PA-C 09/21/23 12:36 EDT    I have worn appropriate PPE during this patient encounter, sanitized my hands both with entering and exiting patient's  room.      50 minutes has been spent by [facility] providers in the care of this patient while under observation status

## 2023-09-21 NOTE — CONSULTS
IDENTIFYING INFORMATION: The patient is a 58-year-old  male admitted with left-sided weakness thought to be functional in origin    CHIEF COMPLAINT: None given    INFORMANT: Patient and chart    RELIABILITY: Fair    HISTORY OF PRESENT ILLNESS: The patient is a 58-year-old  male admitted with complaints of left-sided weakness which have resolved since hospitalization  The patient has a history of hypertension and hyperlipidemia and does have a history of stroke.  He had complained of intermittent left-sided weakness and also reported some visual symptoms with his left eye.  Neurology workup has been negative and there is suspicion that the patient's symptoms may be functional.  Since hospitalization his left-sided weakness has resolved.  When seen today the patient is pleasant cooperative and denies any suicidal or homicidal ideation or prior history of psychiatric treatment.  He denies recent changes in mood sleep or appetite and denies any psychotic symptoms.  He denies abuse of any psychoactive substance.  The patient is an immigrant from [place] and has been in [place] for some 25 years.  PAST PSYCHIATRIC HISTORY: None reported    PAST MEDICAL HISTORY: Significant for hypertension and hyperlipidemia and history of CVA    MEDICATIONS:   Current Facility-Administered Medications   Medication Dose Route Frequency Provider Last Rate Last Admin    acetaminophen (TYLENOL) tablet 650 mg  650 mg Oral Q6H PRN Nazanin Robles APRN   650 mg at 09/20/23 1950    aspirin tablet 325 mg  325 mg Oral Daily Nazanin Robles APRN   325 mg at 09/21/23 1009    Or    aspirin suppository 300 mg  300 mg Rectal Daily Nazanin Robles APRN        atorvastatin (LIPITOR) tablet 80 mg  80 mg Oral Nightly Lisa Ga PA-C   80 mg at 09/20/23 2006    famotidine (PEPCID) tablet 20 mg  20 mg Oral BID Lisa Ga PA-C   20 mg at 09/21/23 1009    losartan (COZAAR) tablet 25 mg  25 mg Oral Daily  Lisa aG PA-C   25 mg at 09/21/23 1009    ondansetron (ZOFRAN) injection 4 mg  4 mg Intravenous Q6H PRN Nazanin Robles APRN        sodium chloride 0.9 % flush 10 mL  10 mL Intravenous PRN Shakir Mcclendon MD        vitamin D (ERGOCALCIFEROL) capsule 50,000 Units  50,000 Units Oral Q7 Days Lisa Ga PA-C   50,000 Units at 09/20/23 1333         ALLERGIES: None    FAMILY HISTORY: Noncontributory    SOCIAL HISTORY: No reported use of alcohol, tobacco, or street drugs    MENTAL STATUS EXAM: The patient is a well-developed well-nourished  male appearing his stated age.  He has no apparent physical distress at the time of examination.  He is awake alert and oriented all spheres.  His mood is euthymic his affect congruent.  Speech is well coherent.  There are no gross deficits in memory or cognition noted.  Intelligence is judged to be in the average range based on fund of knowledge, the patient is cooperative throughout the interview.  He denies current suicidal or homicidal ideation or psychotic features.  Judgement and insight are intact.    ASSETS/LIABILITIES: To be assessed    DIAGNOSTIC IMPRESSION: Possible conversion disorder, medical problems as previously described    PLAN: As always, it is imperative to rule out any and all medical causes for physical symptoms prior to assigning a diagnosis of a conversion disorder, but given the negative neurology workup and rapid resolution of symptoms I have a strong suspicion that there may be a conversion component present.  Whenever the case, the patient's symptoms seemed to resolved from a psychiatric standpoint he is stable for discharge.  Thank you for the opportunity to see this patient.

## 2023-09-21 NOTE — CONSULTS
"Nutrition Services    Patient Name:  Heriberto Fields  YOB: 1965  MRN: [id number]  Admit Date:  9/20/2023    Assessment Date:  09/21/23    Summary: Follow up:  Pt stated 38 lb weight loss over past 9 months was intentional weight loss. Pt stated good appetite here and tolerating HH diet with adequate po intake of meals. BMI 28 (overweight). Last BM 9/19.  Will follow per protocol.    CLINICAL NUTRITION ASSESSMENT      Reason for Assessment Follow-up Protocol     Diagnosis/Problem   TIA   Medical/Surgical History Past Medical History:   Diagnosis Date    GERD (gastroesophageal reflux disease)     Stroke        History reviewed. No pertinent surgical history.     Encounter Information        Nutrition History:     Factors Affecting Intake: swallow impairment     Anthropometrics        Current Height  Current Weight  BMI kg/m2 Height: 160 cm (63\")  Weight: 73.8 kg (162 lb 11.2 oz) (09/20/23 0011)  Body mass index is 28.82 kg/m².   Adjusted BMI (if applicable)    BMI Category Overweight (25 - 29.9)       Admission Weight 162 lb (9/20)       Ideal Body Weight (IBW) 124 lb (56.4 kg)       Usual Body Weight (UBW) 162-200 lb   Weight Trend Loss 38 lb (19%) wt loss x 9 months       Weight History Wt Readings from Last 30 Encounters:   09/20/23 0011 73.8 kg (162 lb 11.2 oz)   09/19/23 2352 80.3 kg (177 lb)   09/04/23 2337 80.5 kg (177 lb 7.5 oz)   09/04/23 2216 77.5 kg (170 lb 13.7 oz)   09/04/23 2052 90.7 kg (200 lb)   09/05/23 1821 80.3 kg (177 lb)   08/03/23 2051 79.8 kg (176 lb)   06/01/23 1308 84.4 kg (186 lb)   01/30/23 0310 81.6 kg (180 lb)      --  Tests/Procedures        Tests/Procedures CT scan, MRI, X-Ray     Labs       Pertinent Labs    Results from last 7 days   Lab Units 09/20/23  0026   SODIUM mmol/L 142   POTASSIUM mmol/L 3.8   CHLORIDE mmol/L 101   CO2 mmol/L 24.0   BUN mg/dL 9   CREATININE mg/dL 1.08   CALCIUM mg/dL 9.3   BILIRUBIN mg/dL 0.2   ALK PHOS U/L 86   ALT (SGPT) U/L 35 "   AST (SGOT) U/L 37   GLUCOSE mg/dL 140*       Results from last 7 days   Lab Units 09/20/23  0026   HEMOGLOBIN g/dL 15.8   HEMATOCRIT % 46.0   WBC 10*3/mm3 7.71   ALBUMIN g/dL 4.5       Results from last 7 days   Lab Units 09/20/23  0026   INR  0.87*   APTT seconds 23.4   PLATELETS 10*3/mm3 328       SARS-CoV-2, KADE   Date Value Ref Range Status   12/06/2021 NEGATIVE Negative Final     Comment:     The 2019-CoV rRT-PCR Assay is only for use under a Food and Drug Administration Emergency Use Authorization. The performance characteristics of the assay were verified by the Clinical Laboratory at [facility]. Results should be used in   conjunction with the patient's clinical symptoms, medical history, and other clinical/laboratory findings to determine an overall clinical diagnosis. Negative results do not preclude infection with SARS-CoV-2 (COVID-19).    Test parameters have not been validated for screening asymptomatic patients.     Lab Results   Component Value Date    HGBA1C 5.60 09/05/2023          Medications           Scheduled Medications aspirin, 325 mg, Oral, Daily   Or  aspirin, 300 mg, Rectal, Daily  atorvastatin, 80 mg, Oral, Nightly  famotidine, 20 mg, Oral, BID  losartan, 25 mg, Oral, Daily  vitamin D, 50,000 Units, Oral, Q7 Days       Infusions     PRN Medications   acetaminophen    ondansetron    [COMPLETED] Insert Peripheral IV **AND** sodium chloride     Physical Findings          General Appearance alert, oriented, overweight, room air   Oral/Mouth Cavity WNL   Edema  no edema   Gastrointestinal last bowel movement: 9/19   Skin  skin intact   Tubes/Drains/Lines none   NFPE No clinical signs of muscle wasting or fat loss   --  Current Nutrition Orders & Evaluation of Intake       Oral Nutrition     Food Allergies NKFA   Current PO Diet Diet: Cardiac Diets; Healthy Heart (2-3 Na+); Texture: Regular Texture (IDDSI 7); Fluid Consistency: Thin (IDDSI 0)   Supplement n/a   PO Evaluation      % PO Intake/# of Days 50%   --  PES STATEMENT / NUTRITION DIAGNOSIS      Nutrition Dx Problem  Problem: Predicted Suboptimal Intake  Etiology: Medical Diagnosis - TIA    Signs/Symptoms: Report/Observation     --  NUTRITION INTERVENTION / PLAN OF CARE      Intervention Goal(s) Maintain nutrition status, Reduce/improve symptoms, Disease management/therapy, Establish PO intake, Tolerate PO , and No significant weight loss         RD Intervention/Action Continue to monitor and Care plan reviewed         Prescription/Orders:       PO Diet       Supplements       Snacks       Enteral Nutrition       Parenteral Nutrition    New Prescription Ordered? No changes at this time   --      Monitor/Evaluation Per protocol, PO intake, Pertinent labs, Weight, Symptoms   Discharge Plan/Needs Pending clinical course   --    RD to follow per protocol.      Electronically signed by:  Suzy Silvestre RD  09/21/23 13:19 EDT

## 2023-09-21 NOTE — PROGRESS NOTES
MD ATTESTATION NOTE    The PAMELA and I have discussed this patient's history, physical exam, and treatment plan.  I have reviewed the documentation and personally had a face to face interaction with the patient. I affirm the documentation and agree with the treatment and plan.  The attached note describes my personal findings.      I provided a substantive portion of the care of the patient.  I personally performed the physical exam in its entirety, and below are my findings.        Brief HPI: This patient is a 58-year-old male admitted to the observation unit due to numbness as well as weakness of his left arm/leg.  He also reports pain to his left shoulder.  Of note, he was admitted recently with same symptoms.  At that point he was given TNK.  Symptoms ultimately resolved and his stroke work-up was fully negative.  Currently, he reports resolution of symptoms and feeling back to his baseline state of health.      PHYSICAL EXAM  ED Triage Vitals   Temp Heart Rate Resp BP SpO2   09/19/23 2352 09/19/23 2352 09/19/23 2352 09/20/23 0013 09/19/23 2352   97.4 °F (36.3 °C) 111 16 152/95 94 %      Temp src Heart Rate Source Patient Position BP Location FiO2 (%)   09/19/23 2352 09/19/23 2352 09/20/23 0013 09/20/23 0013 --   Tympanic Monitor Sitting Right arm        GENERAL: Resting comfortably and in no acute distress, nontoxic in appearance  HENT: nares patent  EYES: no scleral icterus  CV: regular rhythm, normal rate, no M/R/G  RESPIRATORY: normal effort, lungs clear bilaterally  ABDOMEN: soft, nontender, no rebound or guarding  MUSCULOSKELETAL: no deformity, no edema  NEURO: alert, moves all extremities, follows commands  PSYCH:  calm, cooperative  SKIN: warm, dry    Vital signs and nursing notes reviewed.        Plan: The patient's MRI brain was negative for any acute area of ischemia/infarct.  Neuro has also ordered an EEG for further testing.  It is also recommended at this point that psychiatry assess due to no  obvious physical answer for his symptoms.  We will continue to monitor closely.

## 2023-09-22 NOTE — OUTREACH NOTE
Prep Survey      Flowsheet Row Responses   [facility] facility patient discharged from? [place]   Is LACE score < 7 ? Yes   Eligibility Readm Mgmt   Discharge diagnosis TIA (transient ischemic attack)   Does the patient have one of the following disease processes/diagnoses(primary or secondary)? Stroke   Does the patient have Home health ordered? No   Is there a DME ordered? No   Medication alerts for this patient see AVS   Prep survey completed? Yes            Wendy SPENCE - Registered Nurse

## 2023-09-26 ENCOUNTER — READMISSION MANAGEMENT (OUTPATIENT)
Dept: CALL CENTER | Facility: HOSPITAL | Age: 58
End: 2023-09-26
Payer: MEDICAID

## 2023-09-26 NOTE — OUTREACH NOTE
Stroke Week 1 Survey      Flowsheet Row Responses   [facility] facility patient discharged from? [place]   Does the patient have one of the following disease processes/diagnoses(primary or secondary)? Stroke   Week 1 attempt successful? No   Unsuccessful attempts Attempt 1            Sheila CHAN - Licensed Nurse

## 2023-10-02 ENCOUNTER — READMISSION MANAGEMENT (OUTPATIENT)
Dept: CALL CENTER | Facility: HOSPITAL | Age: 58
End: 2023-10-02
Payer: MEDICAID

## 2023-10-02 NOTE — OUTREACH NOTE
Stroke Week 2 Survey      Flowsheet Row Responses   [facility] [place] facility patient discharged from? [place]   Does the patient have one of the following disease processes/diagnoses(primary or secondary)? Stroke   Week 2 attempt successful? No   Unsuccessful attempts Attempt 1  [All numbers listed were attempted-no answer]            Valerie H - Registered Nurse

## 2023-10-06 ENCOUNTER — READMISSION MANAGEMENT (OUTPATIENT)
Dept: CALL CENTER | Facility: HOSPITAL | Age: 58
End: 2023-10-06
Payer: MEDICAID

## 2023-10-06 NOTE — OUTREACH NOTE
Stroke Week 2 Survey      Flowsheet Row Responses   [facility] patient discharged from? [place]   Does the patient have one of the following disease processes/diagnoses(primary or secondary)? Stroke   Week 2 attempt successful? Yes   Call start time 1442   Call end time 1444   Discharge diagnosis TIA (transient ischemic attack)   Meds reviewed with patient/caregiver? Yes   Is the patient having any side effects they believe may be caused by any medication additions or changes? No   Does the patient have all medications ordered at discharge? N/A   Is the patient taking all medications as directed (includes completed medication regime)? Yes   Does the patient have a primary care provider?  Yes   Does the patient have an appointment with their PCP within 7 days of discharge? Yes   Has the patient kept scheduled appointments due by today? Yes   Has home health visited the patient within 72 hours of discharge? N/A   Psychosocial issues? No   Does the patient require any assistance with activities of daily living such as eating, bathing, dressing, walking, etc.? No   Does the patient have any residual symptoms from stroke/TIA? No   Does the patient understand the diet ordered at discharge? Yes   Did the patient receive a copy of their discharge instructions? Yes   Nursing interventions Reviewed instructions with patient   What is the patient's perception of their health status since discharge? Improving   Nursing interventions Nurse provided patient education   Is the patient able to teach back FAST for Stroke? F ace: Look for an uneven smile, E yes: Check for vision loss, B alance: Watch for sudden loss of balance, S peech: Listen for slurred speech, A rm: Check if one arm is weak, T princess: Call 9-1-1 right away   Is the patient/caregiver able to teach back the risk factors for a stroke? History of TIAs   Is the patient/caregiver able to teach back signs and symptoms related to disease process for when to call  PCP? Yes   Is the patient/caregiver able to teach back signs and symptoms related to disease process for when to call 911? Yes   If the patient is a current smoker, are they able to teach back resources for cessation? Not a smoker   Is the patient/caregiver able to teach back the hierarchy of who to call/visit for symptoms/problems? PCP, Specialist, Home health nurse, Urgent Care, ED, 911 Yes   Week 2 call completed? Yes   Call end time 1444            Tran Sawant Registered Nurse

## 2023-10-25 ENCOUNTER — PROCEDURE VISIT (OUTPATIENT)
Dept: NEUROLOGY | Facility: CLINIC | Age: 58
End: 2023-10-25
Payer: MEDICAID

## 2023-10-25 VITALS
HEIGHT: 63 IN | DIASTOLIC BLOOD PRESSURE: 84 MMHG | HEART RATE: 88 BPM | OXYGEN SATURATION: 95 % | SYSTOLIC BLOOD PRESSURE: 120 MMHG | BODY MASS INDEX: 28.83 KG/M2

## 2023-10-25 DIAGNOSIS — R29.898 LEFT ARM WEAKNESS: Primary | ICD-10-CM

## 2023-10-25 NOTE — PROGRESS NOTES
EMG and Nerve Conduction Studies      History:  58-year-old gentleman being evaluated for left-sided weakness and numbness.    Results:  Nerve conduction studies were performed on the left upper and left lower extremities.  Left median, radial, and ulnar antidromic sensory nerve action potentials across the wrist segment were unremarkable (low amplitude ulnar sensory responses are felt to be technical in nature).  Left sural sensory nerve action potentials were normal.  Left median, ulnar, fibular, and tibial compound motor action potentials were all normal throughout.  Left fibular, median, tibial, and ulnar F waves were all present and reasonable in latency.  Tibial H reflexes were present bilaterally and reasonably symmetric.    EMG needle examination of selected muscles of the left lower extremity and left upper extremity revealed no abnormal insertional activity, spontaneous activity, or motor unit remodeling.  Please see accompanying data for details.    Impression:  This nerve conduction study and EMG needle examination of the left upper and left lower extremities is normal.    Matteo Dixon M.D.              Dictated utilizing Dragon dictation.

## 2024-03-18 ENCOUNTER — APPOINTMENT (OUTPATIENT)
Dept: GENERAL RADIOLOGY | Facility: HOSPITAL | Age: 59
End: 2024-03-18
Payer: MEDICAID

## 2024-03-18 ENCOUNTER — HOSPITAL ENCOUNTER (EMERGENCY)
Facility: HOSPITAL | Age: 59
Discharge: HOME | End: 2024-03-18
Attending: EMERGENCY MEDICINE | Admitting: EMERGENCY MEDICINE
Payer: MEDICAID

## 2024-03-18 VITALS
RESPIRATION RATE: 18 BRPM | HEIGHT: 63 IN | TEMPERATURE: 97.4 F | HEART RATE: 102 BPM | OXYGEN SATURATION: 97 % | WEIGHT: 178 LBS | SYSTOLIC BLOOD PRESSURE: 137 MMHG | DIASTOLIC BLOOD PRESSURE: 83 MMHG | BODY MASS INDEX: 31.54 KG/M2

## 2024-03-18 DIAGNOSIS — S39.012A LOW BACK STRAIN, INITIAL ENCOUNTER: Primary | ICD-10-CM

## 2024-03-18 PROCEDURE — 72100 X-RAY EXAM L-S SPINE 2/3 VWS: CPT

## 2024-03-18 PROCEDURE — 99283 EMERGENCY DEPT VISIT LOW MDM: CPT

## 2024-03-18 PROCEDURE — 63710000001 ONDANSETRON ODT 4 MG TABLET DISPERSIBLE: Performed by: NURSE PRACTITIONER

## 2024-03-18 RX ORDER — METHOCARBAMOL 500 MG/1
1000 TABLET, FILM COATED ORAL 3 TIMES DAILY
Qty: 42 TABLET | Refills: 0 | Status: SHIPPED | OUTPATIENT
Start: 2024-03-18

## 2024-03-18 RX ORDER — ONDANSETRON 4 MG/1
4 TABLET, ORALLY DISINTEGRATING ORAL ONCE
Status: COMPLETED | OUTPATIENT
Start: 2024-03-18 | End: 2024-03-18

## 2024-03-18 RX ORDER — HYDROCODONE BITARTRATE AND ACETAMINOPHEN 5; 325 MG/1; MG/1
1 TABLET ORAL ONCE
Status: COMPLETED | OUTPATIENT
Start: 2024-03-18 | End: 2024-03-18

## 2024-03-18 RX ADMIN — HYDROCODONE BITARTRATE AND ACETAMINOPHEN 1 TABLET: 5; 325 TABLET ORAL at 19:05

## 2024-03-18 RX ADMIN — ONDANSETRON 4 MG: 4 TABLET, ORALLY DISINTEGRATING ORAL at 19:06

## 2024-03-18 NOTE — ED PROVIDER NOTES
EMERGENCY DEPARTMENT ENCOUNTER  Room Number:  S05/05  PCP: Courtney Croft APRN  Independent Historians: Patient      HPI:  Chief Complaint: had concerns including Back Pain.     A complete HPI/ROS/PMH/PSH/SH/FH are unobtainable due to: None    Chronic or social conditions impacting patient care (Social Determinants of Health): None      Context: Heriberto Fields is a 58 y.o. male who presents to the ED c/o acute low back pain onset this am upon waking. Denies any fall or injury.  Denies any radicular pain.  Initial triage reports pain radiating down the bilateral legs with the patient denies this on my exam.  He states he does have pain in his knees.  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  He denies any recent injury.  Denies any incontinence.  No fevers.  History of chronic back pain or back surgery.      Prescription drug monitoring program review:     BOLA query complete and reviewed. Treatment plan to include limited course of prescribed controlled substance. Risks including addiction, benefits, and alternatives presented to patient.    PAST MEDICAL HISTORY  Active Ambulatory Problems     Diagnosis Date Noted    Stroke 09/04/2023    Left arm weakness 09/04/2023    HLD (hyperlipidemia) 09/06/2023    TIA (transient ischemic attack) 09/20/2023     Resolved Ambulatory Problems     Diagnosis Date Noted    No Resolved Ambulatory Problems     Past Medical History:   Diagnosis Date    GERD (gastroesophageal reflux disease)          PAST SURGICAL HISTORY  No past surgical history on file.      FAMILY HISTORY  No family history on file.      SOCIAL HISTORY  Social History     Socioeconomic History    Marital status:    Tobacco Use    Smoking status: Never   Vaping Use    Vaping status: Never Used   Substance and Sexual Activity    Alcohol use: Yes     Comment: socially    Drug use: Never    Sexual activity: Defer          ALLERGIES  Patient has no known allergies.      REVIEW OF SYSTEMS  Review of Systems  Included in HPI  All systems reviewed and negative except for those discussed in HPI.      PHYSICAL EXAM    I have reviewed the triage vital signs and nursing notes.    ED Triage Vitals [03/18/24 1753]   Temp Heart Rate Resp BP SpO2   97.4 °F (36.3 °C) 102 18 -- 97 %      Temp src Heart Rate Source Patient Position BP Location FiO2 (%)   -- -- -- -- --       Physical Exam  GENERAL: awake alert, no acute distress  SKIN: Warm, dry  HENT: Normocephalic, atraumatic  EYES: no scleral icterus  CV: regular rhythm, regular rate  RESPIRATORY: normal effort, lungs clear  ABDOMEN: soft, nontender, nondistended  MUSCULOSKELETAL: no deformity.  There is palpable bilateral lumbar paraspinous spasm.  No vertebral tenderness.  No step-off.  There is no foot drop.  No lower extremity weakness.  No calf swelling or tenderness.  Pedal pulses are intact.  NEURO: alert, moves all extremities, follows commands        RADIOLOGY  XR Spine Lumbar 2 or 3 View    Result Date: 3/18/2024  XR SPINE LUMBAR 2 OR 3 VW-  INDICATIONS: Pain  TECHNIQUE: 3 VIEWS OF THE LUMBAR SPINE  COMPARISON: None available  FINDINGS:  Alignment is in range of normal. Vertebral body heights appear preserved. No acute fracture is identified. Multilevel endplate spurring is seen. If further imaging evaluation is indicated, MRI could be considered.       As described.    This report was finalized on 3/18/2024 7:38 PM by Dr. Kirt Paul M.D on Workstation: [id number]         MEDICATIONS GIVEN IN ER  Medications   HYDROcodone-acetaminophen (NORCO) 5-325 MG per tablet 1 tablet (1 tablet Oral Given 3/18/24 1905)   ondansetron ODT (ZOFRAN-ODT) disintegrating tablet 4 mg (4 mg Oral Given 3/18/24 1906)         ORDERS PLACED DURING THIS VISIT:  Orders Placed This Encounter   Procedures    XR Spine Lumbar 2 or 3 View         OUTPATIENT MEDICATION MANAGEMENT:  No current Epic-ordered  facility-administered medications on file.     Current Outpatient Medications Ordered in Epic   Medication Sig Dispense Refill    atorvastatin (LIPITOR) 80 MG tablet Take 1 tablet by mouth Every Night.      budesonide (RINOCORT AQUA) 32 MCG/ACT nasal spray 1 spray into the nostril(s) as directed by provider Daily. 1 each 1    diclofenac (VOLTAREN) 75 MG EC tablet Take 1 tablet by mouth 2 (Two) Times a Day. 20 tablet 0    diphenhydrAMINE (BENADRYL) 25 MG tablet Take 1 tablet by mouth Every 6 (Six) Hours As Needed for Itching. Take until complete resolve the rash. 30 tablet 0    famotidine (PEPCID) 20 MG tablet Take 1 tablet by mouth 2 (Two) Times a Day. Take until complete resolve the rash. 30 tablet 0    levocetirizine (XYZAL) 5 MG tablet Take 1 tablet by mouth Daily. 30 tablet 5    losartan (COZAAR) 25 MG tablet Take 1 tablet by mouth Daily. 30 tablet 5    methocarbamol (ROBAXIN) 500 MG tablet Take 2 tablets by mouth 3 (Three) Times a Day. 42 tablet 0         PROCEDURES  Procedures            PROGRESS, DATA ANALYSIS, CONSULTS, AND MEDICAL DECISION MAKING  All labs have been independently interpreted by me.  All radiology studies have been reviewed by me. All EKG's have been independently viewed and interpreted by me.  Discussion below represents my analysis of pertinent findings related to patient's condition, differential diagnosis, treatment plan and final disposition.    Differential diagnosis includes but is not limited to Lumbago, muscle spasm, vertebral fracture, spinal stenosis, lumbar radiculopathy, cauda equina syndrome, DDD, AAA, retroperitoneal hematoma, SBO, diverticulitis, UTI    ED Course as of 03/18/24 2021   Mon Mar 18, 2024   1930 Viewed x-ray of lumbar spine on PACS system.  My findings are no fracture. [JS]      ED Course User Index  [JS] Sigrid Carr APRN     Patient presents with acute low back pain.  He denies any incontinence.  No fevers.  There is palpable lumbar paraspinous spasm  on exam.  X-ray is negative for acute findings.  Patient will be treated with RICE therapy for musculoskeletal complaint.  Will prescribe short course of muscle relaxer.  Recommend follow-up with primary care physician.  Appears stable at time of discharge.  In agreement the plan.        AS OF 20:21 EDT VITALS:    BP - 137/83  HR - 102  TEMP - 97.4 °F (36.3 °C)  O2 SATS - 97%    COMPLEXITY OF CARE  Admission was considered but after careful review of the patient's presentation, physical examination, diagnostic results, and response to treatment the patient may be safely discharged with outpatient follow-up.      DIAGNOSIS  Final diagnoses:   Low back strain, initial encounter         DISPOSITION  ED Disposition       ED Disposition   Discharge    Condition   Stable    Comment   --                Please note that portions of this document were completed with a voice recognition program.    Note Disclaimer: At [facility], we believe that sharing information builds trust and better relationships. You are receiving this note because you recently visited [facility]. It is possible you will see health information before a provider has talked with you about it. This kind of information can be easy to misunderstand. To help you fully understand what it means for your health, we urge you to discuss this note with your provider.         Sigrid Carr, APRN  03/18/24 2021

## 2024-03-18 NOTE — Clinical Note
[facility] EMERGENCY DEPARTMENT  [address] [facility] [address]  Phone: [phone]    Heriberto Fields was seen and treated in our emergency department on 3/18/2024.  He may return to work on 03/19/2024.         Thank you for choosing [facility].    Mark Carmichael RN

## 2024-03-19 NOTE — ED PROVIDER NOTES
EMERGENCY DEPARTMENT MD ATTESTATION NOTE    SHARED VISIT: This visit was performed by BOTH a physician and an APC. The substantive portion of the medical decision making was performed by this attesting physician who made or approved the management plan and takes responsibility for patient management. All studies documented in the APC note (if performed) were independently interpreted by me.    The PAMELA and I have discussed this patient's history, physical exam, MDM, and treatment plan.  I have reviewed the documentation and personally had a face to face interaction with the patient. The attached note describes my personal findings.        Room Number:  S05/05  PCP: Courtney Croft APRN  Independent Historians: Patient    HPI:  A complete HPI/ROS/PMH/PSH/SH/FH are unobtainable due to: None    Chronic or social conditions impacting patient care (Social Determinants of Health): None      Context: Heriberto Fields is a 58 y.o. male with a medical history of GERD and hypertension who presents to the ED c/o acute pain across the low back area.  Pain began this morning after waking.  He denies any recent falls or injuries.  Pain does not radiate into the legs at all.  He also denies any fevers or bowel or bladder incontinence problems.          Review of prior external notes (non-ED) -and- Review of prior external test results outside of this encounter: I independently reviewed the discharge summary from September 21, 2023 when patient was in the observation unit for evaluation of left-sided weakness as part of stroke workup.        PHYSICAL EXAM    I have reviewed the triage vital signs and nursing notes.    ED Triage Vitals   Temp Heart Rate Resp BP SpO2   03/18/24 1753 03/18/24 1753 03/18/24 1753 03/18/24 1905 03/18/24 1753   97.4 °F (36.3 °C) 102 18 137/83 97 %      Temp src Heart Rate Source Patient Position BP Location FiO2 (%)   -- -- -- -- --              Physical Exam  GENERAL: alert, no acute  distress, sitting calmly in the chair  SKIN: Warm, dry  HENT: Normocephalic, atraumatic  EYES: no scleral icterus  CV: regular rhythm, regular rate  RESPIRATORY: normal effort, lungs clear  ABDOMEN: soft, nontender, nondistended, no peritoneal features  MUSCULOSKELETAL: no deformity, no asymmetry to the extremities.  Mild diffuse tenderness across the bilateral lumbosacral back soft tissues evenly.  No midline tenderness to palpation at the spinal processes.  NEURO: alert, moves all extremities, follows commands, no focal motor or sensory deficits evident.            MEDICATIONS GIVEN IN ER  Medications   HYDROcodone-acetaminophen (NORCO) 5-325 MG per tablet 1 tablet (1 tablet Oral Given 3/18/24 1905)   ondansetron ODT (ZOFRAN-ODT) disintegrating tablet 4 mg (4 mg Oral Given 3/18/24 1906)         ORDERS PLACED DURING THIS VISIT:  Orders Placed This Encounter   Procedures    XR Spine Lumbar 2 or 3 View         PROCEDURES  Procedures        PROGRESS, DATA ANALYSIS, CONSULTS, AND MEDICAL DECISION MAKING  All labs have been independently interpreted by me.  All radiology studies have been reviewed by me. All EKG's have been independently viewed and interpreted by me.  Discussion below represents my analysis of pertinent findings related to patient's condition, differential diagnosis, treatment plan and final disposition.    Differential diagnosis includes but is not limited to lumbar strain, degenerative disc disease, sciatica, AAA, kidney stone, pyelonephritis.    Clinical Scores:                   ED Course as of 03/19/24 2243   Mon Mar 18, 2024   1930 Viewed x-ray of lumbar spine on PACS system.  My findings are no fracture. [JS]      ED Course User Index  [JS] Sigrid Carr APRN       MDM:   I independently interpreted the x-ray of the lumbosacral spine and my findings are: No fracture or subluxation.  There are some features of endplate spurring noted    Overall, patient's clinical presentation is  "nonworrisome from an emergency medicine standpoint.  He is afebrile and nontoxic-appearing.  There are no neurologic deficits.  He has no particular red flags in the history or physical exam features to warrant CT or MRI imaging at this time.  I think his pain is most likely musculoskeletal in nature.  We will plan to discharge him home with a muscle relaxer.  He may use some over-the-counter analgesics as well.  Advised heating pad as needed.  Advise prompt follow-up with PCP.  Will discuss with him the usual \"return to ER\" instructions prior to discharge.    COMPLEXITY OF CARE  Admission was considered but after careful review of the patient's presentation, physical examination, diagnostic results, and response to treatment the patient may be safely discharged with outpatient follow-up.    Please note that portions of this document were completed with a voice recognition program.    Note Disclaimer: At [facility], we believe that sharing information builds trust and better relationships. You are receiving this note because you recently visited [facility]. It is possible you will see health information before a provider has talked with you about it. This kind of information can be easy to misunderstand. To help you fully understand what it means for your health, we urge you to discuss this note with your provider.         Ceferino Schmid MD  03/19/24 8338    "